# Patient Record
Sex: FEMALE | Race: WHITE | NOT HISPANIC OR LATINO | Employment: OTHER | URBAN - METROPOLITAN AREA
[De-identification: names, ages, dates, MRNs, and addresses within clinical notes are randomized per-mention and may not be internally consistent; named-entity substitution may affect disease eponyms.]

---

## 2021-04-17 ENCOUNTER — APPOINTMENT (EMERGENCY)
Dept: RADIOLOGY | Facility: HOSPITAL | Age: 81
DRG: 177 | End: 2021-04-17
Payer: MEDICARE

## 2021-04-17 ENCOUNTER — HOSPITAL ENCOUNTER (INPATIENT)
Facility: HOSPITAL | Age: 81
LOS: 10 days | Discharge: HOME HEALTH | DRG: 177 | End: 2021-04-27
Attending: EMERGENCY MEDICINE | Admitting: INTERNAL MEDICINE
Payer: MEDICARE

## 2021-04-17 DIAGNOSIS — U07.1 COVID-19: Primary | ICD-10-CM

## 2021-04-17 DIAGNOSIS — I10 ESSENTIAL HYPERTENSION: ICD-10-CM

## 2021-04-17 DIAGNOSIS — E66.9 OBESITY (BMI 30-39.9): ICD-10-CM

## 2021-04-17 DIAGNOSIS — R09.02 HYPOXIA: ICD-10-CM

## 2021-04-17 DIAGNOSIS — E11.9 TYPE 2 DIABETES MELLITUS WITHOUT COMPLICATION, WITHOUT LONG-TERM CURRENT USE OF INSULIN (HCC): ICD-10-CM

## 2021-04-17 PROBLEM — J12.82 PNEUMONIA DUE TO COVID-19 VIRUS: Status: ACTIVE | Noted: 2021-04-17

## 2021-04-17 PROBLEM — E87.1 HYPONATREMIA: Status: ACTIVE | Noted: 2021-04-17

## 2021-04-17 LAB
ALBUMIN SERPL BCP-MCNC: 2.4 G/DL (ref 3.5–5)
ALP SERPL-CCNC: 58 U/L (ref 46–116)
ALT SERPL W P-5'-P-CCNC: 41 U/L (ref 12–78)
ANION GAP SERPL CALCULATED.3IONS-SCNC: 13 MMOL/L (ref 4–13)
APTT PPP: 35 SECONDS (ref 23–37)
AST SERPL W P-5'-P-CCNC: 33 U/L (ref 5–45)
BASOPHILS # BLD AUTO: 0.01 THOUSANDS/ΜL (ref 0–0.1)
BASOPHILS NFR BLD AUTO: 0 % (ref 0–1)
BILIRUB SERPL-MCNC: 0.6 MG/DL (ref 0.2–1)
BUN SERPL-MCNC: 14 MG/DL (ref 5–25)
CALCIUM ALBUM COR SERPL-MCNC: 9.4 MG/DL (ref 8.3–10.1)
CALCIUM SERPL-MCNC: 8.1 MG/DL (ref 8.3–10.1)
CHLORIDE SERPL-SCNC: 93 MMOL/L (ref 100–108)
CK SERPL-CCNC: 41 U/L (ref 26–192)
CO2 SERPL-SCNC: 23 MMOL/L (ref 21–32)
CREAT SERPL-MCNC: 0.85 MG/DL (ref 0.6–1.3)
CRP SERPL QL: 157.5 MG/L
D DIMER PPP FEU-MCNC: 1.69 UG/ML FEU
EOSINOPHIL # BLD AUTO: 0 THOUSAND/ΜL (ref 0–0.61)
EOSINOPHIL NFR BLD AUTO: 0 % (ref 0–6)
ERYTHROCYTE [DISTWIDTH] IN BLOOD BY AUTOMATED COUNT: 12.1 % (ref 11.6–15.1)
FLUAV RNA RESP QL NAA+PROBE: NEGATIVE
FLUBV RNA RESP QL NAA+PROBE: NEGATIVE
GFR SERPL CREATININE-BSD FRML MDRD: 64 ML/MIN/1.73SQ M
GLUCOSE SERPL-MCNC: 155 MG/DL (ref 65–140)
HCT VFR BLD AUTO: 39 % (ref 34.8–46.1)
HGB BLD-MCNC: 13.4 G/DL (ref 11.5–15.4)
IMM GRANULOCYTES # BLD AUTO: 0.02 THOUSAND/UL (ref 0–0.2)
IMM GRANULOCYTES NFR BLD AUTO: 0 % (ref 0–2)
INR PPP: 0.99 (ref 0.84–1.19)
LACTATE SERPL-SCNC: 1.4 MMOL/L (ref 0.5–2)
LYMPHOCYTES # BLD AUTO: 0.79 THOUSANDS/ΜL (ref 0.6–4.47)
LYMPHOCYTES NFR BLD AUTO: 15 % (ref 14–44)
MAGNESIUM SERPL-MCNC: 2 MG/DL (ref 1.6–2.6)
MCH RBC QN AUTO: 29.9 PG (ref 26.8–34.3)
MCHC RBC AUTO-ENTMCNC: 34.4 G/DL (ref 31.4–37.4)
MCV RBC AUTO: 87 FL (ref 82–98)
MONOCYTES # BLD AUTO: 0.28 THOUSAND/ΜL (ref 0.17–1.22)
MONOCYTES NFR BLD AUTO: 5 % (ref 4–12)
NEUTROPHILS # BLD AUTO: 4.25 THOUSANDS/ΜL (ref 1.85–7.62)
NEUTS SEG NFR BLD AUTO: 80 % (ref 43–75)
NRBC BLD AUTO-RTO: 0 /100 WBCS
NT-PROBNP SERPL-MCNC: 1805 PG/ML
PLATELET # BLD AUTO: 275 THOUSANDS/UL (ref 149–390)
PMV BLD AUTO: 9.2 FL (ref 8.9–12.7)
POTASSIUM SERPL-SCNC: 3.8 MMOL/L (ref 3.5–5.3)
PROT SERPL-MCNC: 6.4 G/DL (ref 6.4–8.2)
PROTHROMBIN TIME: 13.1 SECONDS (ref 11.6–14.5)
RBC # BLD AUTO: 4.48 MILLION/UL (ref 3.81–5.12)
RSV RNA RESP QL NAA+PROBE: NEGATIVE
SARS-COV-2 RNA RESP QL NAA+PROBE: POSITIVE
SODIUM SERPL-SCNC: 129 MMOL/L (ref 136–145)
TROPONIN I SERPL-MCNC: <0.02 NG/ML
WBC # BLD AUTO: 5.35 THOUSAND/UL (ref 4.31–10.16)

## 2021-04-17 PROCEDURE — 1123F ACP DISCUSS/DSCN MKR DOCD: CPT | Performed by: EMERGENCY MEDICINE

## 2021-04-17 PROCEDURE — 0241U HB NFCT DS VIR RESP RNA 4 TRGT: CPT | Performed by: EMERGENCY MEDICINE

## 2021-04-17 PROCEDURE — 93005 ELECTROCARDIOGRAM TRACING: CPT

## 2021-04-17 PROCEDURE — 82728 ASSAY OF FERRITIN: CPT | Performed by: EMERGENCY MEDICINE

## 2021-04-17 PROCEDURE — 84145 PROCALCITONIN (PCT): CPT | Performed by: EMERGENCY MEDICINE

## 2021-04-17 PROCEDURE — 85025 COMPLETE CBC W/AUTO DIFF WBC: CPT | Performed by: EMERGENCY MEDICINE

## 2021-04-17 PROCEDURE — 83036 HEMOGLOBIN GLYCOSYLATED A1C: CPT | Performed by: INTERNAL MEDICINE

## 2021-04-17 PROCEDURE — 80053 COMPREHEN METABOLIC PANEL: CPT | Performed by: EMERGENCY MEDICINE

## 2021-04-17 PROCEDURE — 83880 ASSAY OF NATRIURETIC PEPTIDE: CPT | Performed by: EMERGENCY MEDICINE

## 2021-04-17 PROCEDURE — 99285 EMERGENCY DEPT VISIT HI MDM: CPT

## 2021-04-17 PROCEDURE — 71045 X-RAY EXAM CHEST 1 VIEW: CPT

## 2021-04-17 PROCEDURE — 87040 BLOOD CULTURE FOR BACTERIA: CPT | Performed by: EMERGENCY MEDICINE

## 2021-04-17 PROCEDURE — 82330 ASSAY OF CALCIUM: CPT | Performed by: EMERGENCY MEDICINE

## 2021-04-17 PROCEDURE — 36415 COLL VENOUS BLD VENIPUNCTURE: CPT | Performed by: EMERGENCY MEDICINE

## 2021-04-17 PROCEDURE — 84484 ASSAY OF TROPONIN QUANT: CPT | Performed by: EMERGENCY MEDICINE

## 2021-04-17 PROCEDURE — 86140 C-REACTIVE PROTEIN: CPT | Performed by: EMERGENCY MEDICINE

## 2021-04-17 PROCEDURE — 83605 ASSAY OF LACTIC ACID: CPT | Performed by: EMERGENCY MEDICINE

## 2021-04-17 PROCEDURE — 96375 TX/PRO/DX INJ NEW DRUG ADDON: CPT

## 2021-04-17 PROCEDURE — 82550 ASSAY OF CK (CPK): CPT | Performed by: EMERGENCY MEDICINE

## 2021-04-17 PROCEDURE — 99291 CRITICAL CARE FIRST HOUR: CPT | Performed by: EMERGENCY MEDICINE

## 2021-04-17 PROCEDURE — 83735 ASSAY OF MAGNESIUM: CPT | Performed by: EMERGENCY MEDICINE

## 2021-04-17 PROCEDURE — 85730 THROMBOPLASTIN TIME PARTIAL: CPT | Performed by: EMERGENCY MEDICINE

## 2021-04-17 PROCEDURE — 96365 THER/PROPH/DIAG IV INF INIT: CPT

## 2021-04-17 PROCEDURE — 85379 FIBRIN DEGRADATION QUANT: CPT | Performed by: EMERGENCY MEDICINE

## 2021-04-17 PROCEDURE — 85610 PROTHROMBIN TIME: CPT | Performed by: EMERGENCY MEDICINE

## 2021-04-17 RX ORDER — ACETAMINOPHEN 325 MG/1
650 TABLET ORAL EVERY 6 HOURS PRN
Status: DISCONTINUED | OUTPATIENT
Start: 2021-04-17 | End: 2021-04-27 | Stop reason: HOSPADM

## 2021-04-17 RX ORDER — ASCORBIC ACID 500 MG
1000 TABLET ORAL EVERY 12 HOURS SCHEDULED
Status: COMPLETED | OUTPATIENT
Start: 2021-04-18 | End: 2021-04-24

## 2021-04-17 RX ORDER — DEXAMETHASONE SODIUM PHOSPHATE 4 MG/ML
6 INJECTION, SOLUTION INTRA-ARTICULAR; INTRALESIONAL; INTRAMUSCULAR; INTRAVENOUS; SOFT TISSUE ONCE
Status: COMPLETED | OUTPATIENT
Start: 2021-04-17 | End: 2021-04-17

## 2021-04-17 RX ORDER — DOXYCYCLINE HYCLATE 100 MG/1
100 CAPSULE ORAL ONCE
Status: COMPLETED | OUTPATIENT
Start: 2021-04-17 | End: 2021-04-17

## 2021-04-17 RX ORDER — ALBUMIN (HUMAN) 12.5 G/50ML
12.5 SOLUTION INTRAVENOUS ONCE
Status: COMPLETED | OUTPATIENT
Start: 2021-04-18 | End: 2021-04-18

## 2021-04-17 RX ORDER — ATORVASTATIN CALCIUM 40 MG/1
40 TABLET, FILM COATED ORAL
Status: DISCONTINUED | OUTPATIENT
Start: 2021-04-18 | End: 2021-04-27 | Stop reason: HOSPADM

## 2021-04-17 RX ORDER — DOXYCYCLINE HYCLATE 100 MG/1
100 CAPSULE ORAL EVERY 12 HOURS
Status: DISCONTINUED | OUTPATIENT
Start: 2021-04-18 | End: 2021-04-19

## 2021-04-17 RX ORDER — DEXAMETHASONE SODIUM PHOSPHATE 4 MG/ML
6 INJECTION, SOLUTION INTRA-ARTICULAR; INTRALESIONAL; INTRAMUSCULAR; INTRAVENOUS; SOFT TISSUE EVERY 24 HOURS
Status: COMPLETED | OUTPATIENT
Start: 2021-04-18 | End: 2021-04-26

## 2021-04-17 RX ORDER — ZINC SULFATE 50(220)MG
220 CAPSULE ORAL DAILY
Status: COMPLETED | OUTPATIENT
Start: 2021-04-18 | End: 2021-04-24

## 2021-04-17 RX ORDER — MULTIVITAMIN/IRON/FOLIC ACID 18MG-0.4MG
1 TABLET ORAL DAILY
Status: DISCONTINUED | OUTPATIENT
Start: 2021-04-25 | End: 2021-04-27 | Stop reason: HOSPADM

## 2021-04-17 RX ORDER — FAMOTIDINE 20 MG/1
20 TABLET, FILM COATED ORAL DAILY
Status: DISCONTINUED | OUTPATIENT
Start: 2021-04-18 | End: 2021-04-27 | Stop reason: HOSPADM

## 2021-04-17 RX ORDER — MELATONIN
2000 DAILY
Status: DISCONTINUED | OUTPATIENT
Start: 2021-04-18 | End: 2021-04-27 | Stop reason: HOSPADM

## 2021-04-17 RX ORDER — CEFTRIAXONE 1 G/50ML
1000 INJECTION, SOLUTION INTRAVENOUS ONCE
Status: COMPLETED | OUTPATIENT
Start: 2021-04-17 | End: 2021-04-17

## 2021-04-17 RX ORDER — ONDANSETRON 2 MG/ML
4 INJECTION INTRAMUSCULAR; INTRAVENOUS EVERY 6 HOURS PRN
Status: DISCONTINUED | OUTPATIENT
Start: 2021-04-17 | End: 2021-04-27 | Stop reason: HOSPADM

## 2021-04-17 RX ORDER — CEFTRIAXONE 1 G/50ML
1000 INJECTION, SOLUTION INTRAVENOUS EVERY 24 HOURS
Status: DISCONTINUED | OUTPATIENT
Start: 2021-04-18 | End: 2021-04-19

## 2021-04-17 RX ADMIN — DOXYCYCLINE 100 MG: 100 CAPSULE ORAL at 21:29

## 2021-04-17 RX ADMIN — CEFTRIAXONE 1000 MG: 1 INJECTION, SOLUTION INTRAVENOUS at 21:30

## 2021-04-17 RX ADMIN — DEXAMETHASONE SODIUM PHOSPHATE 6 MG: 4 INJECTION, SOLUTION INTRA-ARTICULAR; INTRALESIONAL; INTRAMUSCULAR; INTRAVENOUS; SOFT TISSUE at 21:29

## 2021-04-18 LAB
ABO GROUP BLD: NORMAL
ALBUMIN SERPL BCP-MCNC: 2.4 G/DL (ref 3.5–5)
ALP SERPL-CCNC: 59 U/L (ref 46–116)
ALT SERPL W P-5'-P-CCNC: 35 U/L (ref 12–78)
ANION GAP SERPL CALCULATED.3IONS-SCNC: 16 MMOL/L (ref 4–13)
AST SERPL W P-5'-P-CCNC: 33 U/L (ref 5–45)
BASOPHILS # BLD MANUAL: 0 THOUSAND/UL (ref 0–0.1)
BASOPHILS NFR MAR MANUAL: 0 % (ref 0–1)
BILIRUB SERPL-MCNC: 0.5 MG/DL (ref 0.2–1)
BUN SERPL-MCNC: 16 MG/DL (ref 5–25)
CA-I BLD-SCNC: 1.07 MMOL/L (ref 1.12–1.32)
CALCIUM ALBUM COR SERPL-MCNC: 9.7 MG/DL (ref 8.3–10.1)
CALCIUM SERPL-MCNC: 8.4 MG/DL (ref 8.3–10.1)
CHLORIDE SERPL-SCNC: 96 MMOL/L (ref 100–108)
CO2 SERPL-SCNC: 19 MMOL/L (ref 21–32)
CREAT SERPL-MCNC: 0.71 MG/DL (ref 0.6–1.3)
EOSINOPHIL # BLD MANUAL: 0 THOUSAND/UL (ref 0–0.4)
EOSINOPHIL NFR BLD MANUAL: 0 % (ref 0–6)
ERYTHROCYTE [DISTWIDTH] IN BLOOD BY AUTOMATED COUNT: 12 % (ref 11.6–15.1)
EST. AVERAGE GLUCOSE BLD GHB EST-MCNC: 206 MG/DL
FERRITIN SERPL-MCNC: 337 NG/ML (ref 8–388)
GFR SERPL CREATININE-BSD FRML MDRD: 80 ML/MIN/1.73SQ M
GLUCOSE SERPL-MCNC: 165 MG/DL (ref 65–140)
GLUCOSE SERPL-MCNC: 191 MG/DL (ref 65–140)
GLUCOSE SERPL-MCNC: 206 MG/DL (ref 65–140)
GLUCOSE SERPL-MCNC: 207 MG/DL (ref 65–140)
GLUCOSE SERPL-MCNC: 231 MG/DL (ref 65–140)
GLUCOSE SERPL-MCNC: 255 MG/DL (ref 65–140)
HBA1C MFR BLD: 8.8 %
HBV CORE AB SER QL: NORMAL
HBV CORE IGM SER QL: NORMAL
HBV SURFACE AG SER QL: NORMAL
HCT VFR BLD AUTO: 38.2 % (ref 34.8–46.1)
HCV AB SER QL: NORMAL
HGB BLD-MCNC: 12.9 G/DL (ref 11.5–15.4)
HIV 1+2 AB+HIV1 P24 AG SERPL QL IA: NORMAL
HIV1 P24 AG SER QL: NORMAL
LG PLATELETS BLD QL SMEAR: PRESENT
LYMPHOCYTES # BLD AUTO: 0.41 THOUSAND/UL (ref 0.6–4.47)
LYMPHOCYTES # BLD AUTO: 12 % (ref 14–44)
MCH RBC QN AUTO: 29.5 PG (ref 26.8–34.3)
MCHC RBC AUTO-ENTMCNC: 33.8 G/DL (ref 31.4–37.4)
MCV RBC AUTO: 87 FL (ref 82–98)
MONOCYTES # BLD AUTO: 0 THOUSAND/UL (ref 0–1.22)
MONOCYTES NFR BLD: 0 % (ref 4–12)
NEUTROPHILS # BLD MANUAL: 2.98 THOUSAND/UL (ref 1.85–7.62)
NEUTS BAND NFR BLD MANUAL: 5 % (ref 0–8)
NEUTS SEG NFR BLD AUTO: 82 % (ref 43–75)
NRBC BLD AUTO-RTO: 0 /100 WBCS
OSMOLALITY UR/SERPL-RTO: 281 MMOL/KG (ref 282–298)
OSMOLALITY UR: 345 MMOL/KG
PELGER HUET CELLS BLD QL SMEAR: PRESENT
PLATELET # BLD AUTO: 307 THOUSANDS/UL (ref 149–390)
PLATELET BLD QL SMEAR: ADEQUATE
PMV BLD AUTO: 9.8 FL (ref 8.9–12.7)
POTASSIUM SERPL-SCNC: 4 MMOL/L (ref 3.5–5.3)
PROCALCITONIN SERPL-MCNC: <0.05 NG/ML
PROCALCITONIN SERPL-MCNC: <0.05 NG/ML
PROT SERPL-MCNC: 6.5 G/DL (ref 6.4–8.2)
RBC # BLD AUTO: 4.38 MILLION/UL (ref 3.81–5.12)
RH BLD: POSITIVE
SODIUM 24H UR-SCNC: 21 MOL/L
SODIUM SERPL-SCNC: 131 MMOL/L (ref 136–145)
TOTAL CELLS COUNTED SPEC: 100
TSH SERPL DL<=0.05 MIU/L-ACNC: 2.11 UIU/ML (ref 0.36–3.74)
URATE SERPL-MCNC: 4.1 MG/DL (ref 2–6.8)
VARIANT LYMPHS # BLD AUTO: 1 %
WBC # BLD AUTO: 3.42 THOUSAND/UL (ref 4.31–10.16)

## 2021-04-18 PROCEDURE — 80053 COMPREHEN METABOLIC PANEL: CPT | Performed by: INTERNAL MEDICINE

## 2021-04-18 PROCEDURE — 86704 HEP B CORE ANTIBODY TOTAL: CPT | Performed by: INTERNAL MEDICINE

## 2021-04-18 PROCEDURE — 85007 BL SMEAR W/DIFF WBC COUNT: CPT | Performed by: INTERNAL MEDICINE

## 2021-04-18 PROCEDURE — 86803 HEPATITIS C AB TEST: CPT | Performed by: INTERNAL MEDICINE

## 2021-04-18 PROCEDURE — 83935 ASSAY OF URINE OSMOLALITY: CPT | Performed by: INTERNAL MEDICINE

## 2021-04-18 PROCEDURE — XW033E5 INTRODUCTION OF REMDESIVIR ANTI-INFECTIVE INTO PERIPHERAL VEIN, PERCUTANEOUS APPROACH, NEW TECHNOLOGY GROUP 5: ICD-10-PCS | Performed by: INTERNAL MEDICINE

## 2021-04-18 PROCEDURE — 84550 ASSAY OF BLOOD/URIC ACID: CPT | Performed by: INTERNAL MEDICINE

## 2021-04-18 PROCEDURE — 99222 1ST HOSP IP/OBS MODERATE 55: CPT | Performed by: INTERNAL MEDICINE

## 2021-04-18 PROCEDURE — 86901 BLOOD TYPING SEROLOGIC RH(D): CPT | Performed by: INTERNAL MEDICINE

## 2021-04-18 PROCEDURE — 87806 HIV AG W/HIV1&2 ANTB W/OPTIC: CPT | Performed by: INTERNAL MEDICINE

## 2021-04-18 PROCEDURE — 87340 HEPATITIS B SURFACE AG IA: CPT | Performed by: INTERNAL MEDICINE

## 2021-04-18 PROCEDURE — 83930 ASSAY OF BLOOD OSMOLALITY: CPT | Performed by: INTERNAL MEDICINE

## 2021-04-18 PROCEDURE — 84145 PROCALCITONIN (PCT): CPT | Performed by: EMERGENCY MEDICINE

## 2021-04-18 PROCEDURE — 82948 REAGENT STRIP/BLOOD GLUCOSE: CPT

## 2021-04-18 PROCEDURE — 86705 HEP B CORE ANTIBODY IGM: CPT | Performed by: INTERNAL MEDICINE

## 2021-04-18 PROCEDURE — 86900 BLOOD TYPING SEROLOGIC ABO: CPT | Performed by: INTERNAL MEDICINE

## 2021-04-18 PROCEDURE — 85027 COMPLETE CBC AUTOMATED: CPT | Performed by: INTERNAL MEDICINE

## 2021-04-18 PROCEDURE — 84300 ASSAY OF URINE SODIUM: CPT | Performed by: INTERNAL MEDICINE

## 2021-04-18 PROCEDURE — 84443 ASSAY THYROID STIM HORMONE: CPT | Performed by: INTERNAL MEDICINE

## 2021-04-18 RX ORDER — CILOSTAZOL 50 MG/1
50 TABLET ORAL 2 TIMES DAILY
COMMUNITY

## 2021-04-18 RX ORDER — CILOSTAZOL 50 MG/1
50 TABLET ORAL 2 TIMES DAILY
Status: DISCONTINUED | OUTPATIENT
Start: 2021-04-18 | End: 2021-04-27 | Stop reason: HOSPADM

## 2021-04-18 RX ORDER — BENZONATATE 100 MG/1
100 CAPSULE ORAL 3 TIMES DAILY PRN
Status: DISCONTINUED | OUTPATIENT
Start: 2021-04-18 | End: 2021-04-27 | Stop reason: HOSPADM

## 2021-04-18 RX ORDER — SODIUM CHLORIDE 9 MG/ML
75 INJECTION, SOLUTION INTRAVENOUS CONTINUOUS
Status: DISCONTINUED | OUTPATIENT
Start: 2021-04-18 | End: 2021-04-19

## 2021-04-18 RX ORDER — GUAIFENESIN 600 MG
600 TABLET, EXTENDED RELEASE 12 HR ORAL EVERY 12 HOURS SCHEDULED
Status: DISCONTINUED | OUTPATIENT
Start: 2021-04-18 | End: 2021-04-27 | Stop reason: HOSPADM

## 2021-04-18 RX ORDER — POLYETHYLENE GLYCOL 3350 17 G/17G
17 POWDER, FOR SOLUTION ORAL DAILY
Status: DISCONTINUED | OUTPATIENT
Start: 2021-04-18 | End: 2021-04-21

## 2021-04-18 RX ORDER — DOCUSATE SODIUM 100 MG/1
100 CAPSULE, LIQUID FILLED ORAL 2 TIMES DAILY
Status: DISCONTINUED | OUTPATIENT
Start: 2021-04-18 | End: 2021-04-21

## 2021-04-18 RX ORDER — SENNOSIDES 8.6 MG
2 TABLET ORAL
Status: DISCONTINUED | OUTPATIENT
Start: 2021-04-18 | End: 2021-04-21

## 2021-04-18 RX ADMIN — INSULIN LISPRO 2 UNITS: 100 INJECTION, SOLUTION INTRAVENOUS; SUBCUTANEOUS at 17:19

## 2021-04-18 RX ADMIN — INSULIN LISPRO 3 UNITS: 100 INJECTION, SOLUTION INTRAVENOUS; SUBCUTANEOUS at 12:24

## 2021-04-18 RX ADMIN — DEXAMETHASONE SODIUM PHOSPHATE 6 MG: 4 INJECTION, SOLUTION INTRA-ARTICULAR; INTRALESIONAL; INTRAMUSCULAR; INTRAVENOUS; SOFT TISSUE at 21:09

## 2021-04-18 RX ADMIN — ZINC SULFATE 220 MG (50 MG) CAPSULE 220 MG: CAPSULE at 08:42

## 2021-04-18 RX ADMIN — GUAIFENESIN 600 MG: 600 TABLET ORAL at 08:42

## 2021-04-18 RX ADMIN — BENZONATATE 100 MG: 100 CAPSULE ORAL at 14:28

## 2021-04-18 RX ADMIN — ALBUMIN (HUMAN) 12.5 G: 0.25 INJECTION, SOLUTION INTRAVENOUS at 00:27

## 2021-04-18 RX ADMIN — INSULIN LISPRO 3 UNITS: 100 INJECTION, SOLUTION INTRAVENOUS; SUBCUTANEOUS at 08:38

## 2021-04-18 RX ADMIN — ENOXAPARIN SODIUM 30 MG: 30 INJECTION SUBCUTANEOUS at 08:43

## 2021-04-18 RX ADMIN — ATORVASTATIN CALCIUM 40 MG: 40 TABLET, FILM COATED ORAL at 00:25

## 2021-04-18 RX ADMIN — ATORVASTATIN CALCIUM 40 MG: 40 TABLET, FILM COATED ORAL at 21:20

## 2021-04-18 RX ADMIN — FAMOTIDINE 20 MG: 20 TABLET ORAL at 08:42

## 2021-04-18 RX ADMIN — CILOSTAZOL 50 MG: 50 TABLET ORAL at 12:43

## 2021-04-18 RX ADMIN — CILOSTAZOL 50 MG: 50 TABLET ORAL at 17:20

## 2021-04-18 RX ADMIN — CEFTRIAXONE 1000 MG: 1 INJECTION, SOLUTION INTRAVENOUS at 21:07

## 2021-04-18 RX ADMIN — Medication 2000 UNITS: at 08:40

## 2021-04-18 RX ADMIN — DOCUSATE SODIUM 100 MG: 100 CAPSULE, LIQUID FILLED ORAL at 08:41

## 2021-04-18 RX ADMIN — SODIUM CHLORIDE 75 ML/HR: 0.9 INJECTION, SOLUTION INTRAVENOUS at 08:49

## 2021-04-18 RX ADMIN — ENOXAPARIN SODIUM 30 MG: 30 INJECTION SUBCUTANEOUS at 21:15

## 2021-04-18 RX ADMIN — DOXYCYCLINE 100 MG: 100 CAPSULE ORAL at 08:41

## 2021-04-18 RX ADMIN — ENOXAPARIN SODIUM 30 MG: 30 INJECTION SUBCUTANEOUS at 00:26

## 2021-04-18 RX ADMIN — DOXYCYCLINE 100 MG: 100 CAPSULE ORAL at 21:08

## 2021-04-18 RX ADMIN — INSULIN LISPRO 1 UNITS: 100 INJECTION, SOLUTION INTRAVENOUS; SUBCUTANEOUS at 00:25

## 2021-04-18 RX ADMIN — OXYCODONE HYDROCHLORIDE AND ACETAMINOPHEN 1000 MG: 500 TABLET ORAL at 08:43

## 2021-04-18 RX ADMIN — OXYCODONE HYDROCHLORIDE AND ACETAMINOPHEN 1000 MG: 500 TABLET ORAL at 00:25

## 2021-04-18 RX ADMIN — SODIUM CHLORIDE 500 ML: 0.9 INJECTION, SOLUTION INTRAVENOUS at 01:27

## 2021-04-18 RX ADMIN — REMDESIVIR 200 MG: 100 INJECTION, POWDER, LYOPHILIZED, FOR SOLUTION INTRAVENOUS at 08:47

## 2021-04-18 RX ADMIN — GUAIFENESIN 600 MG: 600 TABLET ORAL at 21:15

## 2021-04-18 RX ADMIN — INSULIN LISPRO 2 UNITS: 100 INJECTION, SOLUTION INTRAVENOUS; SUBCUTANEOUS at 21:12

## 2021-04-18 RX ADMIN — SODIUM CHLORIDE 75 ML/HR: 0.9 INJECTION, SOLUTION INTRAVENOUS at 21:25

## 2021-04-18 RX ADMIN — OXYCODONE HYDROCHLORIDE AND ACETAMINOPHEN 1000 MG: 500 TABLET ORAL at 21:15

## 2021-04-18 NOTE — PLAN OF CARE
Problem: Potential for Falls  Goal: Patient will remain free of falls  Description: INTERVENTIONS:  - Assess patient frequently for physical needs  -  Identify cognitive and physical deficits and behaviors that affect risk of falls  -  [place] fall precautions as indicated by assessment   - Educate patient/family on patient safety including physical limitations  - Instruct patient to call for assistance with activity based on assessment  - Modify environment to reduce risk of injury  - Consider OT/PT consult to assist with strengthening/mobility  Outcome: Progressing     Problem: Nutrition/Hydration-ADULT  Goal: Nutrient/Hydration intake appropriate for improving, restoring or maintaining nutritional needs  Description: Monitor and assess patient's nutrition/hydration status for malnutrition  Collaborate with interdisciplinary team and initiate plan and interventions as ordered  Monitor patient's weight and dietary intake as ordered or per policy  Utilize nutrition screening tool and intervene as necessary  Determine patient's food preferences and provide high-protein, high-caloric foods as appropriate       INTERVENTIONS:  - Monitor oral intake, urinary output, labs, and treatment plans  - Assess nutrition and hydration status and recommend course of action  - Evaluate amount of meals eaten  - Assist patient with eating if necessary   - Allow adequate time for meals  - Recommend/ encourage appropriate diets, oral nutritional supplements, and vitamin/mineral supplements  - Order, calculate, and assess calorie counts as needed  - Recommend, monitor, and adjust tube feedings and TPN/PPN based on assessed needs  - Assess need for intravenous fluids  - Provide specific nutrition/hydration education as appropriate  - Include patient/family/caregiver in decisions related to nutrition  Outcome: Progressing     Problem: PAIN - ADULT  Goal: Verbalizes/displays adequate comfort level or baseline comfort level  Description: Interventions:  - Encourage patient to monitor pain and request assistance  - Assess pain using appropriate pain scale  - Administer analgesics based on type and severity of pain and evaluate response  - Implement non-pharmacological measures as appropriate and evaluate response  - Consider cultural and social influences on pain and pain management  - Notify physician/advanced practitioner if interventions unsuccessful or patient reports new pain  Outcome: Progressing     Problem: INFECTION - ADULT  Goal: Absence or prevention of progression during hospitalization  Description: INTERVENTIONS:  - Assess and monitor for signs and symptoms of infection  - Monitor lab/diagnostic results  - Monitor all insertion sites, i e  indwelling lines, tubes, and drains  - Monitor endotracheal if appropriate and nasal secretions for changes in amount and color  - [place] appropriate cooling/warming therapies per order  - Administer medications as ordered  - Instruct and encourage patient and family to use good hand hygiene technique  - Identify and instruct in appropriate isolation precautions for identified infection/condition  Outcome: Progressing     Problem: SAFETY ADULT  Goal: Patient will remain free of falls  Description: INTERVENTIONS:  - Assess patient frequently for physical needs  -  Identify cognitive and physical deficits and behaviors that affect risk of falls    -  [place] fall precautions as indicated by assessment   - Educate patient/family on patient safety including physical limitations  - Instruct patient to call for assistance with activity based on assessment  - Modify environment to reduce risk of injury  - Consider OT/PT consult to assist with strengthening/mobility  Outcome: Progressing  Goal: Maintain or return to baseline ADL function  Description: INTERVENTIONS:  -  Assess patient's ability to carry out ADLs; assess patient's baseline for ADL function and identify physical deficits which impact ability to perform ADLs (bathing, care of mouth/teeth, toileting, grooming, dressing, etc )  - Assess/evaluate cause of self-care deficits   - Assess range of motion  - Assess patient's mobility; develop plan if impaired  - Assess patient's need for assistive devices and provide as appropriate  - Encourage maximum independence but intervene and supervise when necessary  - Involve family in performance of ADLs  - Assess for home care needs following discharge   - Consider OT consult to assist with ADL evaluation and planning for discharge  - Provide patient education as appropriate  Outcome: Progressing  Goal: Maintain or return mobility status to optimal level  Description: INTERVENTIONS:  - Assess patient's baseline mobility status (ambulation, transfers, stairs, etc )    - Identify cognitive and physical deficits and behaviors that affect mobility  - Identify mobility aids required to assist with transfers and/or ambulation (gait belt, sit-to-stand, lift, walker, cane, etc )  - [place] fall precautions as indicated by assessment  - Record patient progress and toleration of activity level on Mobility SBAR; progress patient to next Phase/Stage  - Instruct patient to call for assistance with activity based on assessment  - Consider rehabilitation consult to assist with strengthening/weightbearing, etc   Outcome: Progressing     Problem: DISCHARGE PLANNING  Goal: Discharge to home or other facility with appropriate resources  Description: INTERVENTIONS:  - Identify barriers to discharge w/patient and caregiver  - Arrange for needed discharge resources and transportation as appropriate  - Identify discharge learning needs (meds, wound care, etc )  - Arrange for interpretive services to assist at discharge as needed  - Refer to Case Management Department for coordinating discharge planning if the patient needs post-hospital services based on physician/advanced practitioner order or complex needs related to functional status, cognitive ability, or social support system  Outcome: Progressing     Problem: Knowledge Deficit  Goal: Patient/family/caregiver demonstrates understanding of disease process, treatment plan, medications, and discharge instructions  Description: Complete learning assessment and assess knowledge base    Interventions:  - Provide teaching at level of understanding  - Provide teaching via preferred learning methods  Outcome: Progressing     Problem: RESPIRATORY - ADULT  Goal: Achieves optimal ventilation and oxygenation  Description: INTERVENTIONS:  - Assess for changes in respiratory status  - Assess for changes in mentation and behavior  - Position to facilitate oxygenation and minimize respiratory effort  - Oxygen administered by appropriate delivery if ordered  - Initiate smoking cessation education as indicated  - Encourage broncho-pulmonary hygiene including cough, deep breathe, Incentive Spirometry  - Assess the need for suctioning and aspirate as needed  - Assess and instruct to report SOB or any respiratory difficulty  - Respiratory Therapy support as indicated  Outcome: Progressing     Problem: MUSCULOSKELETAL - ADULT  Goal: Maintain or return mobility to safest level of function  Description: INTERVENTIONS:  - Assess patient's ability to carry out ADLs; assess patient's baseline for ADL function and identify physical deficits which impact ability to perform ADLs (bathing, care of mouth/teeth, toileting, grooming, dressing, etc )  - Assess/evaluate cause of self-care deficits   - Assess range of motion  - Assess patient's mobility  - Assess patient's need for assistive devices and provide as appropriate  - Encourage maximum independence but intervene and supervise when necessary  - Involve family in performance of ADLs  - Assess for home care needs following discharge   - Consider OT consult to assist with ADL evaluation and planning for discharge  - Provide patient education as appropriate  Outcome: Progressing

## 2021-04-18 NOTE — PLAN OF CARE
Problem: Potential for Falls  Goal: Patient will remain free of falls  Description: INTERVENTIONS:  - Assess patient frequently for physical needs  -  Identify cognitive and physical deficits and behaviors that affect risk of falls  -  [place] fall precautions as indicated by assessment   - Educate patient/family on patient safety including physical limitations  - Instruct patient to call for assistance with activity based on assessment  - Modify environment to reduce risk of injury  - Consider OT/PT consult to assist with strengthening/mobility  Outcome: Progressing     Problem: Nutrition/Hydration-ADULT  Goal: Nutrient/Hydration intake appropriate for improving, restoring or maintaining nutritional needs  Description: Monitor and assess patient's nutrition/hydration status for malnutrition  Collaborate with interdisciplinary team and initiate plan and interventions as ordered  Monitor patient's weight and dietary intake as ordered or per policy  Utilize nutrition screening tool and intervene as necessary  Determine patient's food preferences and provide high-protein, high-caloric foods as appropriate       INTERVENTIONS:  - Monitor oral intake, urinary output, labs, and treatment plans  - Assess nutrition and hydration status and recommend course of action  - Evaluate amount of meals eaten  - Assist patient with eating if necessary   - Allow adequate time for meals  - Recommend/ encourage appropriate diets, oral nutritional supplements, and vitamin/mineral supplements  - Order, calculate, and assess calorie counts as needed  - Recommend, monitor, and adjust tube feedings and TPN/PPN based on assessed needs  - Assess need for intravenous fluids  - Provide specific nutrition/hydration education as appropriate  - Include patient/family/caregiver in decisions related to nutrition  Outcome: Progressing     Problem: PAIN - ADULT  Goal: Verbalizes/displays adequate comfort level or baseline comfort level  Description: Interventions:  - Encourage patient to monitor pain and request assistance  - Assess pain using appropriate pain scale  - Administer analgesics based on type and severity of pain and evaluate response  - Implement non-pharmacological measures as appropriate and evaluate response  - Consider cultural and social influences on pain and pain management  - Notify physician/advanced practitioner if interventions unsuccessful or patient reports new pain  Outcome: Progressing     Problem: INFECTION - ADULT  Goal: Absence or prevention of progression during hospitalization  Description: INTERVENTIONS:  - Assess and monitor for signs and symptoms of infection  - Monitor lab/diagnostic results  - Monitor all insertion sites, i e  indwelling lines, tubes, and drains  - Monitor endotracheal if appropriate and nasal secretions for changes in amount and color  - [place] appropriate cooling/warming therapies per order  - Administer medications as ordered  - Instruct and encourage patient and family to use good hand hygiene technique  - Identify and instruct in appropriate isolation precautions for identified infection/condition  Outcome: Progressing     Problem: SAFETY ADULT  Goal: Patient will remain free of falls  Description: INTERVENTIONS:  - Assess patient frequently for physical needs  -  Identify cognitive and physical deficits and behaviors that affect risk of falls    -  [place] fall precautions as indicated by assessment   - Educate patient/family on patient safety including physical limitations  - Instruct patient to call for assistance with activity based on assessment  - Modify environment to reduce risk of injury  - Consider OT/PT consult to assist with strengthening/mobility  Outcome: Progressing  Goal: Maintain or return to baseline ADL function  Description: INTERVENTIONS:  -  Assess patient's ability to carry out ADLs; assess patient's baseline for ADL function and identify physical deficits which impact ability to perform ADLs (bathing, care of mouth/teeth, toileting, grooming, dressing, etc )  - Assess/evaluate cause of self-care deficits   - Assess range of motion  - Assess patient's mobility; develop plan if impaired  - Assess patient's need for assistive devices and provide as appropriate  - Encourage maximum independence but intervene and supervise when necessary  - Involve family in performance of ADLs  - Assess for home care needs following discharge   - Consider OT consult to assist with ADL evaluation and planning for discharge  - Provide patient education as appropriate  Outcome: Progressing  Goal: Maintain or return mobility status to optimal level  Description: INTERVENTIONS:  - Assess patient's baseline mobility status (ambulation, transfers, stairs, etc )    - Identify cognitive and physical deficits and behaviors that affect mobility  - Identify mobility aids required to assist with transfers and/or ambulation (gait belt, sit-to-stand, lift, walker, cane, etc )  - [place] fall precautions as indicated by assessment  - Record patient progress and toleration of activity level on Mobility SBAR; progress patient to next Phase/Stage  - Instruct patient to call for assistance with activity based on assessment  - Consider rehabilitation consult to assist with strengthening/weightbearing, etc   Outcome: Progressing     Problem: DISCHARGE PLANNING  Goal: Discharge to home or other facility with appropriate resources  Description: INTERVENTIONS:  - Identify barriers to discharge w/patient and caregiver  - Arrange for needed discharge resources and transportation as appropriate  - Identify discharge learning needs (meds, wound care, etc )  - Arrange for interpretive services to assist at discharge as needed  - Refer to Case Management Department for coordinating discharge planning if the patient needs post-hospital services based on physician/advanced practitioner order or complex needs related to functional status, cognitive ability, or social support system  Outcome: Progressing     Problem: Knowledge Deficit  Goal: Patient/family/caregiver demonstrates understanding of disease process, treatment plan, medications, and discharge instructions  Description: Complete learning assessment and assess knowledge base    Interventions:  - Provide teaching at level of understanding  - Provide teaching via preferred learning methods  Outcome: Progressing     Problem: RESPIRATORY - ADULT  Goal: Achieves optimal ventilation and oxygenation  Description: INTERVENTIONS:  - Assess for changes in respiratory status  - Assess for changes in mentation and behavior  - Position to facilitate oxygenation and minimize respiratory effort  - Oxygen administered by appropriate delivery if ordered  - Initiate smoking cessation education as indicated  - Encourage broncho-pulmonary hygiene including cough, deep breathe, Incentive Spirometry  - Assess the need for suctioning and aspirate as needed  - Assess and instruct to report SOB or any respiratory difficulty  - Respiratory Therapy support as indicated  Outcome: Progressing     Problem: MUSCULOSKELETAL - ADULT  Goal: Maintain or return mobility to safest level of function  Description: INTERVENTIONS:  - Assess patient's ability to carry out ADLs; assess patient's baseline for ADL function and identify physical deficits which impact ability to perform ADLs (bathing, care of mouth/teeth, toileting, grooming, dressing, etc )  - Assess/evaluate cause of self-care deficits   - Assess range of motion  - Assess patient's mobility  - Assess patient's need for assistive devices and provide as appropriate  - Encourage maximum independence but intervene and supervise when necessary  - Involve family in performance of ADLs  - Assess for home care needs following discharge   - Consider OT consult to assist with ADL evaluation and planning for discharge  - Provide patient education as appropriate  Outcome: Progressing

## 2021-04-18 NOTE — ASSESSMENT & PLAN NOTE
No results found for: HGBA1C    No results for input(s): POCGLU in the last 72 hours  Blood Sugar Average: Last 72 hrs:    · Patient reports she takes oral diabetes medications at home, She is not sure which ones or doses  Denies current use of insulin or other injection medications     · Start SSI  · Order A1c

## 2021-04-18 NOTE — ED PROVIDER NOTES
History  Chief Complaint   Patient presents with    Shortness of Breath     low Spo2 readings at home  78-year-old female presents for evaluation of a generalized fatigue this been ongoing for the last week but progressively became worse yesterday  Patient underwent outpatient COVID testing a few days ago but has not received the results yet  Denies chest pain or shortness of breath at this time but states she just feels very tired  Worse with any exertion  No specific alleviating factors  Fatigue  Severity:  Moderate  Onset quality:  Gradual  Duration:  7 days  Timing:  Constant  Progression:  Worsening  Chronicity:  New  Associated symptoms: no abdominal pain, no chest pain, no cough, no diarrhea, no dysuria, no fever, no frequency, no headaches, no nausea, no shortness of breath, no urgency and no vomiting        None       Past Medical History:   Diagnosis Date    [facility]        History reviewed  No pertinent surgical history  History reviewed  No pertinent family history  I have reviewed and agree with the history as documented  E-Cigarette/Vaping    E-Cigarette Use Never User      E-Cigarette/Vaping Substances    Nicotine No     THC No     CBD No     Flavoring No     Other No     Unknown No      Social History     Tobacco Use    Smoking status: Former Smoker    Smokeless tobacco: Never Used   Substance Use Topics    Alcohol use: Not Currently    Drug use: Not Currently       Review of Systems   Constitutional: Positive for fatigue  Negative for chills, diaphoresis and fever  HENT: Negative for congestion and rhinorrhea  Eyes: Negative for pain and visual disturbance  Respiratory: Negative for cough, shortness of breath and wheezing  Cardiovascular: Negative for chest pain and leg swelling  Gastrointestinal: Negative for abdominal pain, diarrhea, nausea and vomiting  Genitourinary: Negative for difficulty urinating, dysuria, frequency and urgency  Musculoskeletal: Negative for back pain and neck pain  Skin: Negative for color change and rash  Neurological: Negative for syncope, numbness and headaches  All other systems reviewed and are negative  Physical Exam  Physical Exam  Vitals signs and nursing note reviewed  Constitutional:       Appearance: She is well-developed  HENT:      Head: Normocephalic and atraumatic  Right Ear: External ear normal       Left Ear: External ear normal       Nose: Nose normal    Eyes:      General: No scleral icterus  Neck:      Musculoskeletal: Normal range of motion  Cardiovascular:      Rate and Rhythm: Normal rate  Pulmonary:      Effort: Pulmonary effort is normal  No respiratory distress  Abdominal:      General: There is no distension  Musculoskeletal: Normal range of motion  General: No deformity  Skin:     Findings: No rash  Neurological:      General: No focal deficit present  Mental Status: She is alert and oriented to person, place, and time     Psychiatric:         Mood and Affect: Mood normal          Vital Signs  ED Triage Vitals [04/17/21 2026]   Temperature Pulse Respirations Blood Pressure SpO2   100 5 °F (38 1 °C) 69 18 (!) 222/95 (!) 84 %      Temp Source Heart Rate Source Patient Position - Orthostatic VS BP Location FiO2 (%)   Tympanic Monitor Lying Left arm --      Pain Score       --           Vitals:    04/17/21 2026 04/17/21 2230   BP: (!) 222/95 (!) 182/73   Pulse: 69 62   Patient Position - Orthostatic VS: Lying          Visual Acuity      ED Medications  Medications   cefTRIAXone (ROCEPHIN) IVPB (premix in dextrose) 1,000 mg 50 mL (0 mg Intravenous Stopped 4/17/21 2242)   doxycycline hyclate (VIBRAMYCIN) capsule 100 mg (100 mg Oral Given 4/17/21 2129)   dexamethasone (DECADRON) injection 6 mg (6 mg Intravenous Given 4/17/21 2129)       Diagnostic Studies  Results Reviewed     Procedure Component Value Units Date/Time    COVID19, Influenza A/B, RSV PCR, Shelia Aden [[id number]]  (Abnormal) Collected: 04/17/21 2111    Lab Status: Final result Specimen: Nares from Nasopharyngeal Swab Updated: 04/17/21 2208     SARS-CoV-2 Positive     INFLUENZA A PCR Negative     INFLUENZA B PCR Negative     RSV PCR Negative    Narrative: This test has been authorized by FDA under an EUA (Emergency Use Assay) for use by authorized laboratories  Clinical caution and judgement should be used with the interpretation of these results with consideration of the clinical impression and other laboratory testing  Testing reported as "Positive" or "Negative" has been proven to be accurate according to standard laboratory validation requirements  All testing is performed with control materials showing appropriate reactivity at standard intervals  Blood culture [[id number]] Collected: 04/17/21 2155    Lab Status:  In process Specimen: Blood from Hand, Right Updated: 04/17/21 2205    C-reactive protein [[id number]]  (Abnormal) Collected: 04/17/21 2117    Lab Status: Final result Specimen: Blood from Arm, Left Updated: 04/17/21 2200      5 mg/L     Comprehensive metabolic panel [[id number]]  (Abnormal) Collected: 04/17/21 2117    Lab Status: Final result Specimen: Blood from Arm, Left Updated: 04/17/21 2200     Sodium 129 mmol/L      Potassium 3 8 mmol/L      Chloride 93 mmol/L      CO2 23 mmol/L      ANION GAP 13 mmol/L      BUN 14 mg/dL      Creatinine 0 85 mg/dL      Glucose 155 mg/dL      Calcium 8 1 mg/dL      Corrected Calcium 9 4 mg/dL      AST 33 U/L      ALT 41 U/L      Alkaline Phosphatase 58 U/L      Total Protein 6 4 g/dL      Albumin 2 4 g/dL      Total Bilirubin 0 60 mg/dL      eGFR 64 ml/min/1 73sq m     Narrative:      Patricia guidelines for Chronic Kidney Disease (CKD):     Stage 1 with normal or high GFR (GFR > 90 mL/min/1 73 square meters)    Stage 2 Mild CKD (GFR = 60-89 mL/min/1 73 square meters)    Stage 3A Moderate CKD (GFR = 45-59 mL/min/1 73 square meters)    Stage 3B Moderate CKD (GFR = 30-44 mL/min/1 73 square meters)    Stage 4 Severe CKD (GFR = 15-29 mL/min/1 73 square meters)    Stage 5 End Stage CKD (GFR <15 mL/min/1 73 square meters)  Note: GFR calculation is accurate only with a steady state creatinine    Magnesium [[id number]]  (Normal) Collected: 04/17/21 2117    Lab Status: Final result Specimen: Blood from Arm, Left Updated: 04/17/21 2200     Magnesium 2 0 mg/dL     NT-BNP PRO [[id number]]  (Abnormal) Collected: 04/17/21 2117    Lab Status: Final result Specimen: Blood from Arm, Left Updated: 04/17/21 2200     NT-proBNP 1,805 pg/mL     CK [[id number]]  (Normal) Collected: 04/17/21 2117    Lab Status: Final result Specimen: Blood from Arm, Left Updated: 04/17/21 2157     Total CK 41 U/L     Blood culture [[id number]] Collected: 04/17/21 2117    Lab Status: In process Specimen: Blood from Arm, Left Updated: 04/17/21 2154    Lactic acid, plasma [[id number]]  (Normal) Collected: 04/17/21 2117    Lab Status: Final result Specimen: Blood from Arm, Left Updated: 04/17/21 2152     LACTIC ACID 1 4 mmol/L     Narrative:      Result may be elevated if tourniquet was used during collection      Troponin I [[id number]]  (Normal) Collected: 04/17/21 2117    Lab Status: Final result Specimen: Blood from Arm, Left Updated: 04/17/21 2151     Troponin I <0 02 ng/mL     D-dimer, quantitative [[id number]]  (Abnormal) Collected: 04/17/21 2117    Lab Status: Final result Specimen: Blood from Arm, Left Updated: 04/17/21 2145     D-Dimer, Quant 1 69 ug/ml FEU     Protime-INR [[id number]]  (Normal) Collected: 04/17/21 2117    Lab Status: Final result Specimen: Blood from Arm, Left Updated: 04/17/21 2142     Protime 13 1 seconds      INR 0 99    APTT [[id number]]  (Normal) Collected: 04/17/21 2117    Lab Status: Final result Specimen: Blood from Arm, Left Updated: 04/17/21 2142     PTT 35 seconds     CBC and differential [[id number]]  (Abnormal) Collected: 04/17/21 2117    Lab Status: Final result Specimen: Blood from Arm, Left Updated: 04/17/21 2129     WBC 5 35 Thousand/uL      RBC 4 48 Million/uL      Hemoglobin 13 4 g/dL      Hematocrit 39 0 %      MCV 87 fL      MCH 29 9 pg      MCHC 34 4 g/dL      RDW 12 1 %      MPV 9 2 fL      Platelets 334 Thousands/uL      nRBC 0 /100 WBCs      Neutrophils Relative 80 %      Immat GRANS % 0 %      Lymphocytes Relative 15 %      Monocytes Relative 5 %      Eosinophils Relative 0 %      Basophils Relative 0 %      Neutrophils Absolute 4 25 Thousands/µL      Immature Grans Absolute 0 02 Thousand/uL      Lymphocytes Absolute 0 79 Thousands/µL      Monocytes Absolute 0 28 Thousand/µL      Eosinophils Absolute 0 00 Thousand/µL      Basophils Absolute 0 01 Thousands/µL     Calcium, ionized [[id number]] Collected: 04/17/21 2117    Lab Status: In process Specimen: Blood from Arm, Left Updated: 04/17/21 2124    Procalcitonin [[id number]] Collected: 04/17/21 2117    Lab Status: In process Specimen: Blood from Arm, Left Updated: 04/17/21 2124    Ferritin [[id number]] Collected: 04/17/21 2117    Lab Status: In process Specimen: Blood from Arm, Left Updated: 04/17/21 2124                 XR chest portable    (Results Pending)              Procedures  CriticalCare Time  Performed by: Zhang Khan DO  Authorized by:  Zhang Khan DO     Critical care provider statement:     Critical care time (minutes):  32    Critical care time was exclusive of:  Separately billable procedures and treating other patients and teaching time    Critical care was necessary to treat or prevent imminent or life-threatening deterioration of the following conditions:  Respiratory failure and sepsis    Critical care was time spent personally by me on the following activities:  Blood draw for specimens, obtaining history from patient or surrogate, development of treatment plan with patient or surrogate, discussions with primary provider, evaluation of patient's response to treatment, ordering and performing treatments and interventions, ordering and review of laboratory studies, ordering and review of radiographic studies and re-evaluation of patient's condition    I assumed direction of critical care for this patient from another provider in my specialty: no               ED Course                             SBIRT 20yo+      Most Recent Value   SBIRT (22 yo +)   In order to provide better care to our patients, we are screening all of our patients for alcohol and drug use  Would it be okay to ask you these screening questions? Yes Filed at: 04/17/2021 2131   Initial Alcohol Screen: US AUDIT-C    1  How often do you have a drink containing alcohol?  0 Filed at: 04/17/2021 2131   2  How many drinks containing alcohol do you have on a typical day you are drinking? 0 Filed at: 04/17/2021 2131   3a  Male UNDER 65: How often do you have five or more drinks on one occasion? 0 Filed at: 04/17/2021 2131   3b  FEMALE Any Age, or MALE 65+: How often do you have 4 or more drinks on one occassion? 0 Filed at: 04/17/2021 2131   Audit-C Score  0 Filed at: 04/17/2021 2131   ALMA: How many times in the past year have you    Used an illegal drug or used a prescription medication for non-medical reasons? Never Filed at: 04/17/2021 2131                    MDM  Number of Diagnoses or Management Options  COVID-19: new and requires workup  Hypoxia: new and requires workup  Diagnosis management comments: 50-year-old female with hypoxia and fatigue  Suspect COVID  Obtain COVID/sepsis evaluation  Administer antibiotics  Patient placed on 4 L nasal cannula with improvement of symptoms         Amount and/or Complexity of Data Reviewed  Clinical lab tests: ordered  Tests in the radiology section of CPT®: ordered  Tests in the medicine section of CPT®: ordered  Discussion of test results with the performing providers: yes  Decide to obtain previous medical records or to obtain history from someone other than the patient: yes  Obtain history from someone other than the patient: yes  Discuss the patient with other providers: yes  Independent visualization of images, tracings, or specimens: yes    Risk of Complications, Morbidity, and/or Mortality  Presenting problems: moderate  Diagnostic procedures: moderate  Management options: moderate    Patient Progress  Patient progress: stable      Disposition  Final diagnoses:   COVID-19   Hypoxia     Time reflects when diagnosis was documented in both MDM as applicable and the Disposition within this note     Time User Action Codes Description Comment    4/17/2021 10:24 PM Xuan De Jesus [U07 1] COVID-19     4/17/2021 10:25 PM Xuan De Jesus [R09 02] Hypoxia       ED Disposition     ED Disposition Condition Date/Time Comment    Admit Stable Sat Apr 17, 2021 10:24 PM Case was discussed with MELO and the patient's admission status was agreed to be Admission Status: inpatient status to the service of Dr Dom Ayon   Follow-up Information    None         Patient's Medications    No medications on file     No discharge procedures on file      PDMP Review     None          ED Provider  Electronically Signed by           Micki Toney DO  04/17/21 8299

## 2021-04-18 NOTE — QUICK NOTE
Nursing contacted patient's daughter to obtain medication list  Daughter not sure about what medications her mom takes  [facility]'s [facility] reports only medication on file is cilostazol 50 mg BID  Patient verbally reported that she takes oral hypoglycemic medication for type 2 diabetes but is unsure of medication name or dose  Denies BP/heart medications

## 2021-04-18 NOTE — ASSESSMENT & PLAN NOTE
· Likely chronic due to reduced PO intake vs SIADH from COVID pna  · Sodium 129 on admission, received IVF and now improved to 131   · Patient reports that she has been very fatigued and has had decreased p o  intake  · Ordered 500 mL bolus  · Continue to monitor

## 2021-04-18 NOTE — ASSESSMENT & PLAN NOTE
· Sodium 129 on admission   · Patient reports that she has been very fatigue and has had decreased p o  intake  · Order 500 mL bolus  · Continue to monitor

## 2021-04-18 NOTE — H&P
Timmy Bowden  H&P- Lourdes Grain 1940, 79 y o  female MRN: [id number]  Unit/Bed#: [facility] Encounter: [id number]  Primary Care Provider: Abraham Meyers MD   Date and time admitted to hospital: 4/17/2021  8:22 PM    * Pneumonia due to COVID-19 virus  Assessment & Plan  · Came in due to generalized fatigue over the past week  Patient had a COVID test outpatient but has been waiting for the results  · COVID positive in the ED  · 84% on room air, initially placed on 2 L now on 4 L  · Consider starting Actemra if continues to be over 4 L for 12 hours  · Cardiac markers   · Troponin negative  · CK normal 41  · BNP 1805  · Inflammatory markers   · CRP:  157 5  · D-dimer elevated at 1 69  · Ferritin pending  · In the ED patient received ceftriaxone, doxycycline and Decadron  · Start vitamin cocktail, Pepcid, remdesivir  · Start patient on atorvastatin 40 mg daily    Type 2 diabetes mellitus, without long-term current use of insulin ([facility] )  Assessment & Plan  No results found for: HGBA1C    No results for input(s): POCGLU in the last 72 hours  Blood Sugar Average: Last 72 hrs:    · Patient reports she takes oral diabetes medications at home, She is not sure which ones or doses  Denies current use of insulin or other injection medications  · Start SSI  · Order A1c    Hyponatremia  Assessment & Plan  · Sodium 129 on admission   · Patient reports that she has been very fatigue and has had decreased p o  intake  · Order 500 mL bolus  · Continue to monitor    VTE Prophylaxis: Enoxaparin (Lovenox)  / sequential compression device   Code Status: Level 1 Full Code   POLST: There is no POLST form on file for this patient (pre-hospital)  Discussion with family: No    Anticipated Length of Stay:  Patient will be admitted on an Inpatient basis with an anticipated length of stay of  > 2 midnights     Justification for Hospital Stay: Covid 19     Total Time for Visit, including Counseling / Coordination of Care: 45 minutes  Greater than 50% of this total time spent on direct patient counseling and coordination of care  Chief Complaint:   Generalized fatigue     History of Present Illness:    Eze England is a 79 y o  female with past medical history of type 2 diabetes who presents with generalized fatigue  Patient reports that she has been feeling fatigue and weak over the past week  Patient had gotten a COVID test a couple days ago and was still waiting on the results  Has had decreased appetite and poor intake of fluids  Patient denies feeling short of breath, chest pain, abdominal pain, fevers, chills, nausea, vomiting or diarrhea  Patient reports history of type 2 diabetes  Reports that she takes oral medications for diabetes but is unsure of name of the medication or the dose  She denies history of high blood pressure or heart problems  She is unsure of what other medications she takes  Review of Systems:    Review of Systems   Constitutional: Positive for appetite change and fatigue  Negative for fever  HENT: Negative for sore throat  Respiratory: Negative for cough, chest tightness and shortness of breath  Cardiovascular: Negative for chest pain  Gastrointestinal: Negative for abdominal distention, abdominal pain, diarrhea, nausea and vomiting  Genitourinary: Negative for difficulty urinating  Musculoskeletal: Negative for arthralgias  Neurological: Positive for weakness  Negative for headaches  Psychiatric/Behavioral: Negative for agitation and behavioral problems  All other systems reviewed and are negative  Past Medical and Surgical History:     Past Medical History:   Diagnosis Date    [facility]        History reviewed  No pertinent surgical history  Meds/Allergies:    Prior to Admission medications    Not on File     I have been unable to obtain / verify an up to date medication list despite all reasonable attempts      Allergies: No Known Allergies    Social History:     Marital Status: Unknown   Occupation: Retired   Patient Pre-hospital Living Situation: Home   Patient Pre-hospital Level of Mobility: Full  Patient Pre-hospital Diet Restrictions: Diabetic   Substance Use History:   Social History     Substance and Sexual Activity   Alcohol Use Not Currently     Social History     Tobacco Use   Smoking Status Former Smoker   Smokeless Tobacco Never Used     Social History     Substance and Sexual Activity   Drug Use Not Currently       Family History:    History reviewed  No pertinent family history  Physical Exam:     Vitals:   Blood Pressure: (!) 178/65 (04/17/21 2327)  Pulse: 66 (04/17/21 2327)  Temperature: 98 3 °F (36 8 °C) (04/17/21 2327)  Temp Source: Oral (04/17/21 2327)  Respirations: 18 (04/17/21 2327)  Height: 5' 2" (157 5 cm) (04/17/21 2327)  Weight - Scale: 77 2 kg (170 lb 3 1 oz) (04/17/21 2327)  SpO2: 92 % (04/17/21 2327)    Physical Exam  Vitals signs and nursing note reviewed  Constitutional:       Appearance: Normal appearance  HENT:      Head: Normocephalic  Eyes:      Extraocular Movements: Extraocular movements intact  Pupils: Pupils are equal, round, and reactive to light  Neck:      Musculoskeletal: Normal range of motion  Cardiovascular:      Rate and Rhythm: Normal rate and regular rhythm  Heart sounds: No murmur  Pulmonary:      Effort: Pulmonary effort is normal  No respiratory distress  Breath sounds: No wheezing  Comments: Decreased breath sounds   Abdominal:      General: Bowel sounds are normal  There is no distension  Tenderness: There is no abdominal tenderness  There is no guarding  Musculoskeletal: Normal range of motion  Right lower leg: No edema  Left lower leg: No edema  Skin:     General: Skin is warm  Neurological:      General: No focal deficit present  Mental Status: She is alert and oriented to person, place, and time     Psychiatric: Mood and Affect: Mood normal          Behavior: Behavior normal          Thought Content: Thought content normal          Additional Data:     Lab Results: I have personally reviewed pertinent reports  Results from last 7 days   Lab Units 04/17/21 2117   WBC Thousand/uL 5 35   HEMOGLOBIN g/dL 13 4   HEMATOCRIT % 39 0   PLATELETS Thousands/uL 275   NEUTROS PCT % 80*   LYMPHS PCT % 15   MONOS PCT % 5   EOS PCT % 0     Results from last 7 days   Lab Units 04/17/21 2117   SODIUM mmol/L 129*   POTASSIUM mmol/L 3 8   CHLORIDE mmol/L 93*   CO2 mmol/L 23   BUN mg/dL 14   CREATININE mg/dL 0 85   ANION GAP mmol/L 13   CALCIUM mg/dL 8 1*   ALBUMIN g/dL 2 4*   TOTAL BILIRUBIN mg/dL 0 60   ALK PHOS U/L 58   ALT U/L 41   AST U/L 33   GLUCOSE RANDOM mg/dL 155*     Results from last 7 days   Lab Units 04/17/21 2117   INR  0 99             Results from last 7 days   Lab Units 04/17/21 2117   LACTIC ACID mmol/L 1 4       Imaging: I have personally reviewed pertinent reports  XR chest portable    (Results Pending)         [facility] [place] [facility] Yes     ** Please Note: This note has been constructed using a voice recognition system   **

## 2021-04-18 NOTE — ASSESSMENT & PLAN NOTE
· Came in due to generalized fatigue over the past week  Patient had a COVID test outpatient but has been waiting for the results    · COVID positive in the ED  · 84% on room air, initially placed on 2 L now on 4 L  · Consider starting Actemra if continues to be over 4 L for 12 hours  · Cardiac markers   · Troponin negative  · CK normal 41  · BNP 1805  · Inflammatory markers   · CRP:  157 5  · D-dimer elevated at 1 69  · Ferritin pending  · In the ED patient received ceftriaxone, doxycycline and Decadron  · Start vitamin cocktail, Pepcid, remdesivir  · Start patient on atorvastatin 40 mg daily

## 2021-04-19 LAB
ATRIAL RATE: 63 BPM
CORTIS AM PEAK SERPL-MCNC: 4 UG/DL (ref 4.2–22.4)
GLUCOSE SERPL-MCNC: 195 MG/DL (ref 65–140)
GLUCOSE SERPL-MCNC: 228 MG/DL (ref 65–140)
GLUCOSE SERPL-MCNC: 237 MG/DL (ref 65–140)
GLUCOSE SERPL-MCNC: 238 MG/DL (ref 65–140)
P AXIS: 61 DEGREES
PR INTERVAL: 150 MS
QRS AXIS: -58 DEGREES
QRSD INTERVAL: 146 MS
QT INTERVAL: 468 MS
QTC INTERVAL: 478 MS
T WAVE AXIS: 80 DEGREES
VENTRICULAR RATE: 63 BPM

## 2021-04-19 PROCEDURE — 82533 TOTAL CORTISOL: CPT | Performed by: INTERNAL MEDICINE

## 2021-04-19 PROCEDURE — 93010 ELECTROCARDIOGRAM REPORT: CPT | Performed by: INTERNAL MEDICINE

## 2021-04-19 PROCEDURE — 94762 N-INVAS EAR/PLS OXIMTRY CONT: CPT

## 2021-04-19 PROCEDURE — 99233 SBSQ HOSP IP/OBS HIGH 50: CPT | Performed by: INTERNAL MEDICINE

## 2021-04-19 PROCEDURE — XW033H5 INTRODUCTION OF TOCILIZUMAB INTO PERIPHERAL VEIN, PERCUTANEOUS APPROACH, NEW TECHNOLOGY GROUP 5: ICD-10-PCS | Performed by: INTERNAL MEDICINE

## 2021-04-19 PROCEDURE — 82948 REAGENT STRIP/BLOOD GLUCOSE: CPT

## 2021-04-19 RX ORDER — INSULIN GLARGINE 100 [IU]/ML
8 INJECTION, SOLUTION SUBCUTANEOUS
Status: DISCONTINUED | OUTPATIENT
Start: 2021-04-19 | End: 2021-04-20

## 2021-04-19 RX ADMIN — REMDESIVIR 100 MG: 100 INJECTION, POWDER, LYOPHILIZED, FOR SOLUTION INTRAVENOUS at 09:10

## 2021-04-19 RX ADMIN — INSULIN LISPRO 2 UNITS: 100 INJECTION, SOLUTION INTRAVENOUS; SUBCUTANEOUS at 12:09

## 2021-04-19 RX ADMIN — DEXAMETHASONE SODIUM PHOSPHATE 6 MG: 4 INJECTION, SOLUTION INTRA-ARTICULAR; INTRALESIONAL; INTRAMUSCULAR; INTRAVENOUS; SOFT TISSUE at 20:24

## 2021-04-19 RX ADMIN — SENNOSIDES 17.2 MG: 8.6 TABLET, FILM COATED ORAL at 22:23

## 2021-04-19 RX ADMIN — INSULIN LISPRO 2 UNITS: 100 INJECTION, SOLUTION INTRAVENOUS; SUBCUTANEOUS at 22:17

## 2021-04-19 RX ADMIN — ZINC SULFATE 220 MG (50 MG) CAPSULE 220 MG: CAPSULE at 09:14

## 2021-04-19 RX ADMIN — ENOXAPARIN SODIUM 30 MG: 30 INJECTION SUBCUTANEOUS at 09:14

## 2021-04-19 RX ADMIN — TOCILIZUMAB 600 MG: 20 INJECTION, SOLUTION, CONCENTRATE INTRAVENOUS at 20:21

## 2021-04-19 RX ADMIN — GUAIFENESIN 600 MG: 600 TABLET ORAL at 20:23

## 2021-04-19 RX ADMIN — CILOSTAZOL 50 MG: 50 TABLET ORAL at 09:12

## 2021-04-19 RX ADMIN — BENZONATATE 100 MG: 100 CAPSULE ORAL at 09:09

## 2021-04-19 RX ADMIN — ATORVASTATIN CALCIUM 40 MG: 40 TABLET, FILM COATED ORAL at 22:22

## 2021-04-19 RX ADMIN — CILOSTAZOL 50 MG: 50 TABLET ORAL at 17:36

## 2021-04-19 RX ADMIN — INSULIN LISPRO 3 UNITS: 100 INJECTION, SOLUTION INTRAVENOUS; SUBCUTANEOUS at 17:36

## 2021-04-19 RX ADMIN — OXYCODONE HYDROCHLORIDE AND ACETAMINOPHEN 1000 MG: 500 TABLET ORAL at 09:10

## 2021-04-19 RX ADMIN — DOXYCYCLINE 100 MG: 100 CAPSULE ORAL at 09:10

## 2021-04-19 RX ADMIN — INSULIN LISPRO 3 UNITS: 100 INJECTION, SOLUTION INTRAVENOUS; SUBCUTANEOUS at 09:12

## 2021-04-19 RX ADMIN — INSULIN GLARGINE 8 UNITS: 100 INJECTION, SOLUTION SUBCUTANEOUS at 22:18

## 2021-04-19 RX ADMIN — ENOXAPARIN SODIUM 30 MG: 30 INJECTION SUBCUTANEOUS at 20:36

## 2021-04-19 RX ADMIN — FAMOTIDINE 20 MG: 20 TABLET ORAL at 09:10

## 2021-04-19 RX ADMIN — OXYCODONE HYDROCHLORIDE AND ACETAMINOPHEN 1000 MG: 500 TABLET ORAL at 20:23

## 2021-04-19 RX ADMIN — GUAIFENESIN 600 MG: 600 TABLET ORAL at 09:10

## 2021-04-19 RX ADMIN — Medication 2000 UNITS: at 09:09

## 2021-04-19 NOTE — ASSESSMENT & PLAN NOTE
Lab Results   Component Value Date    HGBA1C 8 8 (H) 04/17/2021       Recent Labs     04/18/21  2104 04/19/21  0722 04/19/21  1113 04/19/21  1734   POCGLU 207* 238* 228* 237*       Blood Sugar Average: Last 72 hrs:  (P) 219  · Patient reports she takes oral diabetes medications at home, She is not sure which ones or doses  Denies current use of insulin or other injection medications  · Continue SSI,  Will increase regimen  · Hemoglobin A1c suggest patient may need adjustment outpatient regimen     Recommend follow-up with outpatient provider after patient has completed course of steroids,  currently experiencing steroid induced hyperglycemia

## 2021-04-19 NOTE — PROGRESS NOTES
[place]  Progress Note - Naheed Mo 1940, 79 y o  female MRN: [id number]  Unit/Bed#: -[facility] Encounter: [id number]  Primary Care Provider: Eulalia Shah MD   Date and time admitted to hospital: 4/17/2021  8:22 PM    * Pneumonia due to COVID-19 virus  Assessment & Plan  · Came in due to generalized fatigue over the past week  Patient had a COVID test outpatient but has been waiting for the results  · COVID positive in the ED  · 84% on room air, initially placed on 2 L now on 5 L,  If patient requires increasing oxygen, consult Pulmonary team   ·   Ordered Actemra 4/19  · Cardiac markers   · Troponin negative  · CK normal 41  · BNP 1805  · Inflammatory markers   · CRP:  157 5  · D-dimer elevated at 1 69  · Ferritin pending  · In the ED patient received ceftriaxone, doxycycline and Decadron  · Continue vitamin cocktail, Pepcid, remdesivir  · Continue patient on atorvastatin 40 mg daily    Type 2 diabetes mellitus, without long-term current use of insulin [facility])  Assessment & Plan  Lab Results   Component Value Date    HGBA1C 8 8 (H) 04/17/2021       Recent Labs     04/18/21  2104 04/19/21  0722 04/19/21  1113 04/19/21  1734   POCGLU 207* 238* 228* 237*       Blood Sugar Average: Last 72 hrs:  (P) 219  · Patient reports she takes oral diabetes medications at home, She is not sure which ones or doses  Denies current use of insulin or other injection medications  · Continue SSI,  Will increase regimen  · Hemoglobin A1c suggest patient may need adjustment outpatient regimen  Recommend follow-up with outpatient provider after patient has completed course of steroids,  currently experiencing steroid induced hyperglycemia    Hyponatremia  Assessment & Plan  · Likely chronic due to reduced PO intake vs SIADH from COVID pna  · Sodium 129 on admission, received IVF and now improved to 131   · Patient reports that she has been very fatigued and has had decreased p o  intake  · Ordered 500 mL bolus  · Continue to monitor      VTE Pharmacologic Prophylaxis:   Pharmacologic: Enoxaparin (Lovenox)  Mechanical VTE Prophylaxis in Place: Yes    Patient Centered Rounds: I have performed bedside rounds with nursing staff today  Discussions with Specialists or Other Care Team Provider: Discussed with CM and with nursing, if patient has increasing O2 requirements, Pulmonology needs to be contacted overnight  Education and Discussions with Family / Patient: Called patient's daughter    Time Spent for Care: 30 minutes  More than 50% of total time spent on counseling and coordination of care as described above  Current Length of Stay: 2 day(s)    Current Patient Status: Inpatient   Certification Statement: The patient will continue to require additional inpatient hospital stay due to Increased oxygen requirements, IV treatment of COVID-19    Discharge Plan: Patient comes from home, PT/OT to evaluate when patient more clinically stable  Code Status: Level 1 - Full Code      Subjective:   Patient reports a poor appetite but denies feeling SOB while on supplemental O2  Objective:     Vitals:   Temp (24hrs), Av 9 °F (36 6 °C), Min:97 6 °F (36 4 °C), Max:98 1 °F (36 7 °C)    Temp:  [97 6 °F (36 4 °C)-98 1 °F (36 7 °C)] 97 6 °F (36 4 °C)  HR:  [60-67] 67  BP: (163-165)/(71-93) 163/71  SpO2:  [89 %-96 %] 96 %  Body mass index is 31 13 kg/m²  Input and Output Summary (last 24 hours): Intake/Output Summary (Last 24 hours) at 2021 1749  Last data filed at 2021 1401  Gross per 24 hour   Intake 180 ml   Output 450 ml   Net -270 ml       Physical Exam:     Physical Exam  Constitutional:       General: She is not in acute distress  Appearance: Normal appearance  She is well-developed  HENT:      Head: Normocephalic and atraumatic  Eyes:      General: No scleral icterus       Conjunctiva/sclera: Conjunctivae normal    Cardiovascular:      Rate and Rhythm: Normal rate and regular rhythm  Heart sounds: No murmur  Pulmonary:      Effort: Pulmonary effort is normal       Breath sounds: Decreased air movement present  Decreased breath sounds present  No wheezing, rhonchi or rales  Abdominal:      General: There is no distension  Palpations: Abdomen is soft  Skin:     General: Skin is warm and dry  Neurological:      General: No focal deficit present  Mental Status: She is alert  Psychiatric:         Mood and Affect: Mood normal          Additional Data:     Labs:    Results from last 7 days   Lab Units 04/18/21  0611 04/17/21 2117   WBC Thousand/uL 3 42* 5 35   HEMOGLOBIN g/dL 12 9 13 4   HEMATOCRIT % 38 2 39 0   PLATELETS Thousands/uL 307 275   BANDS PCT % 5  --    NEUTROS PCT %  --  80*   LYMPHS PCT %  --  15   LYMPHO PCT % 12*  --    MONOS PCT %  --  5   MONO PCT % 0*  --    EOS PCT % 0 0     Results from last 7 days   Lab Units 04/18/21  0611   SODIUM mmol/L 131*   POTASSIUM mmol/L 4 0   CHLORIDE mmol/L 96*   CO2 mmol/L 19*   BUN mg/dL 16   CREATININE mg/dL 0 71   ANION GAP mmol/L 16*   CALCIUM mg/dL 8 4   ALBUMIN g/dL 2 4*   TOTAL BILIRUBIN mg/dL 0 50   ALK PHOS U/L 59   ALT U/L 35   AST U/L 33   GLUCOSE RANDOM mg/dL 206*     Results from last 7 days   Lab Units 04/17/21 2117   INR  0 99     Results from last 7 days   Lab Units 04/19/21  1734 04/19/21  1113 04/19/21  0722 04/18/21  2104 04/18/21  1714 04/18/21  1107 04/18/21  0752 04/18/21  0022   POC GLUCOSE mg/dl 237* 228* 238* 207* 191* 255* 231* 165*     Results from last 7 days   Lab Units 04/17/21 2117   HEMOGLOBIN A1C % 8 8*     Results from last 7 days   Lab Units 04/18/21  0630 04/17/21 2117   LACTIC ACID mmol/L  --  1 4   PROCALCITONIN ng/ml <0 05 <0 05           * I Have Reviewed All Lab Data Listed Above  * Additional Pertinent Lab Tests Reviewed:  All Labs Within Last 24 Hours Reviewed    Imaging:    Imaging Reports Reviewed Today Include:   · CXR 4/17: Hazy infiltrate throughout the right lung most prominent in right parahilar position  Probable left basilar infiltrate  Findings are compatible with Covid pneumonia  Imaging Personally Reviewed by Myself Includes:  None    Recent Cultures (last 7 days):     Results from last 7 days   Lab Units 04/17/21 2155 04/17/21 2117   BLOOD CULTURE  No Growth at 24 hrs  No Growth at 24 hrs         Last 24 Hours Medication List:   Current Facility-Administered Medications   Medication Dose Route Frequency Provider Last Rate    acetaminophen  650 mg Oral Q6H PRN Pia Lundy PA-C      ascorbic acid  1,000 mg Oral Q12H [address] Pia Lundy PA-C      atorvastatin  40 mg Oral HS Pia Lundy PA-C      benzonatate  100 mg Oral TID PRN Marc Hart MD      cefTRIAXone  1,000 mg Intravenous Q24H Pia Lundy PA-C 1,000 mg (04/18/21 2107)    cholecalciferol  2,000 Units Oral Daily Pia Lundy PA-C      cilostazol  50 mg Oral BID Marc Hart MD      dexamethasone  6 mg Intravenous Q24H Pia Lundy PA-C      docusate sodium  100 mg Oral BID Marc Hart MD      doxycycline hyclate  100 mg Oral Q12H Pia Lundy PA-C      enoxaparin  30 mg Subcutaneous Q12H [address] Pia Lundy PA-C      famotidine  20 mg Oral Daily Pia Lundy PA-C      guaiFENesin  600 mg Oral Q12H [address] Marc Hart MD      insulin lispro  1-6 Units Subcutaneous HS Cinthya PÉREZ PA-C      insulin lispro  2-12 Units Subcutaneous TID AC Cinthya PÉREZ PA-C      zinc sulfate  220 mg Oral Daily Brook Marquez PA-C      Followed by   Jaqui Reed ON 4/25/2021] multivitamin-minerals  1 tablet Oral Daily Brook Marquez PA-C      ondansetron  4 mg Intravenous Q6H PRN Pia Lundy PA-C      polyethylene glycol  17 g Oral Daily Marc Hart MD      remdesivir  100 mg Intravenous Q24H Pia Lundy PA-C      senna  2 tablet Oral HS Marc Hart MD      sodium chloride  75 mL/hr Intravenous Continuous Marc Hatr MD 75 mL/hr (04/18/21 2125)    tocilizumab (ACTEMRA) 100 mL IVPB  600 mg Intravenous Once Daylisa Elliott PA-C          Today, Patient Was Seen By: Dani Hernandez PA-C    ** Please Note: Dictation voice to text software may have been used in the creation of this document   **

## 2021-04-19 NOTE — CASE MANAGEMENT
LOS: 2 days  Outside patient room and called via phone [phone] per positive COVID protocol to review discharge process    Patient with increased SOB when talking also caused coughing and de-sating  CM introduced self and will return tomorrow to discuss discharge palnning

## 2021-04-19 NOTE — PLAN OF CARE
Problem: Potential for Falls  Goal: Patient will remain free of falls  Description: INTERVENTIONS:  - Assess patient frequently for physical needs  -  Identify cognitive and physical deficits and behaviors that affect risk of falls  -  [place] fall precautions as indicated by assessment   - Educate patient/family on patient safety including physical limitations  - Instruct patient to call for assistance with activity based on assessment  - Modify environment to reduce risk of injury  - Consider OT/PT consult to assist with strengthening/mobility  Outcome: Progressing     Problem: Nutrition/Hydration-ADULT  Goal: Nutrient/Hydration intake appropriate for improving, restoring or maintaining nutritional needs  Description: Monitor and assess patient's nutrition/hydration status for malnutrition  Collaborate with interdisciplinary team and initiate plan and interventions as ordered  Monitor patient's weight and dietary intake as ordered or per policy  Utilize nutrition screening tool and intervene as necessary  Determine patient's food preferences and provide high-protein, high-caloric foods as appropriate       INTERVENTIONS:  - Monitor oral intake, urinary output, labs, and treatment plans  - Assess nutrition and hydration status and recommend course of action  - Evaluate amount of meals eaten  - Assist patient with eating if necessary   - Allow adequate time for meals  - Recommend/ encourage appropriate diets, oral nutritional supplements, and vitamin/mineral supplements  - Order, calculate, and assess calorie counts as needed  - Recommend, monitor, and adjust tube feedings and TPN/PPN based on assessed needs  - Assess need for intravenous fluids  - Provide specific nutrition/hydration education as appropriate  - Include patient/family/caregiver in decisions related to nutrition  Outcome: Progressing     Problem: PAIN - ADULT  Goal: Verbalizes/displays adequate comfort level or baseline comfort level  Description: Interventions:  - Encourage patient to monitor pain and request assistance  - Assess pain using appropriate pain scale  - Administer analgesics based on type and severity of pain and evaluate response  - Implement non-pharmacological measures as appropriate and evaluate response  - Consider cultural and social influences on pain and pain management  - Notify physician/advanced practitioner if interventions unsuccessful or patient reports new pain  Outcome: Progressing     Problem: INFECTION - ADULT  Goal: Absence or prevention of progression during hospitalization  Description: INTERVENTIONS:  - Assess and monitor for signs and symptoms of infection  - Monitor lab/diagnostic results  - Monitor all insertion sites, i e  indwelling lines, tubes, and drains  - Monitor endotracheal if appropriate and nasal secretions for changes in amount and color  - [place] appropriate cooling/warming therapies per order  - Administer medications as ordered  - Instruct and encourage patient and family to use good hand hygiene technique  - Identify and instruct in appropriate isolation precautions for identified infection/condition  Outcome: Progressing     Problem: SAFETY ADULT  Goal: Patient will remain free of falls  Description: INTERVENTIONS:  - Assess patient frequently for physical needs  -  Identify cognitive and physical deficits and behaviors that affect risk of falls    -  [place] fall precautions as indicated by assessment   - Educate patient/family on patient safety including physical limitations  - Instruct patient to call for assistance with activity based on assessment  - Modify environment to reduce risk of injury  - Consider OT/PT consult to assist with strengthening/mobility  Outcome: Progressing  Goal: Maintain or return to baseline ADL function  Description: INTERVENTIONS:  -  Assess patient's ability to carry out ADLs; assess patient's baseline for ADL function and identify physical deficits which impact ability to perform ADLs (bathing, care of mouth/teeth, toileting, grooming, dressing, etc )  - Assess/evaluate cause of self-care deficits   - Assess range of motion  - Assess patient's mobility; develop plan if impaired  - Assess patient's need for assistive devices and provide as appropriate  - Encourage maximum independence but intervene and supervise when necessary  - Involve family in performance of ADLs  - Assess for home care needs following discharge   - Consider OT consult to assist with ADL evaluation and planning for discharge  - Provide patient education as appropriate  Outcome: Progressing  Goal: Maintain or return mobility status to optimal level  Description: INTERVENTIONS:  - Assess patient's baseline mobility status (ambulation, transfers, stairs, etc )    - Identify cognitive and physical deficits and behaviors that affect mobility  - Identify mobility aids required to assist with transfers and/or ambulation (gait belt, sit-to-stand, lift, walker, cane, etc )  - [place] fall precautions as indicated by assessment  - Record patient progress and toleration of activity level on Mobility SBAR; progress patient to next Phase/Stage  - Instruct patient to call for assistance with activity based on assessment  - Consider rehabilitation consult to assist with strengthening/weightbearing, etc   Outcome: Progressing     Problem: DISCHARGE PLANNING  Goal: Discharge to home or other facility with appropriate resources  Description: INTERVENTIONS:  - Identify barriers to discharge w/patient and caregiver  - Arrange for needed discharge resources and transportation as appropriate  - Identify discharge learning needs (meds, wound care, etc )  - Arrange for interpretive services to assist at discharge as needed  - Refer to Case Management Department for coordinating discharge planning if the patient needs post-hospital services based on physician/advanced practitioner order or complex needs related to functional status, cognitive ability, or social support system  Outcome: Progressing     Problem: Knowledge Deficit  Goal: Patient/family/caregiver demonstrates understanding of disease process, treatment plan, medications, and discharge instructions  Description: Complete learning assessment and assess knowledge base    Interventions:  - Provide teaching at level of understanding  - Provide teaching via preferred learning methods  Outcome: Progressing     Problem: RESPIRATORY - ADULT  Goal: Achieves optimal ventilation and oxygenation  Description: INTERVENTIONS:  - Assess for changes in respiratory status  - Assess for changes in mentation and behavior  - Position to facilitate oxygenation and minimize respiratory effort  - Oxygen administered by appropriate delivery if ordered  - Initiate smoking cessation education as indicated  - Encourage broncho-pulmonary hygiene including cough, deep breathe, Incentive Spirometry  - Assess the need for suctioning and aspirate as needed  - Assess and instruct to report SOB or any respiratory difficulty  - Respiratory Therapy support as indicated  Outcome: Progressing     Problem: MUSCULOSKELETAL - ADULT  Goal: Maintain or return mobility to safest level of function  Description: INTERVENTIONS:  - Assess patient's ability to carry out ADLs; assess patient's baseline for ADL function and identify physical deficits which impact ability to perform ADLs (bathing, care of mouth/teeth, toileting, grooming, dressing, etc )  - Assess/evaluate cause of self-care deficits   - Assess range of motion  - Assess patient's mobility  - Assess patient's need for assistive devices and provide as appropriate  - Encourage maximum independence but intervene and supervise when necessary  - Involve family in performance of ADLs  - Assess for home care needs following discharge   - Consider OT consult to assist with ADL evaluation and planning for discharge  - Provide patient education as appropriate  Outcome: Progressing

## 2021-04-19 NOTE — ASSESSMENT & PLAN NOTE
· Came in due to generalized fatigue over the past week  Patient had a COVID test outpatient but has been waiting for the results    · COVID positive in the ED  · 84% on room air, initially placed on 2 L now on 5 L,  If patient requires increasing oxygen, consult Pulmonary team   ·   Ordered Actemra 4/19  · Cardiac markers   · Troponin negative  · CK normal 41  · BNP 1805  · Inflammatory markers   · CRP:  157 5  · D-dimer elevated at 1 69  · Ferritin pending  · In the ED patient received ceftriaxone, doxycycline and Decadron  · Continue vitamin cocktail, Pepcid, remdesivir  · Continue patient on atorvastatin 40 mg daily

## 2021-04-19 NOTE — PROGRESS NOTES
This RN explained to patient the importance of Proning with having the Covid symptoms  Pt states, " Unable to lie on belly"  Pt agreed with oob to chair  WCTM

## 2021-04-20 PROBLEM — J96.01 ACUTE RESPIRATORY FAILURE WITH HYPOXIA (HCC): Status: ACTIVE | Noted: 2021-04-20

## 2021-04-20 LAB
ALBUMIN SERPL BCP-MCNC: 2.2 G/DL (ref 3.5–5)
ALP SERPL-CCNC: 57 U/L (ref 46–116)
ALT SERPL W P-5'-P-CCNC: 25 U/L (ref 12–78)
ANION GAP SERPL CALCULATED.3IONS-SCNC: 16 MMOL/L (ref 4–13)
AST SERPL W P-5'-P-CCNC: 21 U/L (ref 5–45)
BILIRUB SERPL-MCNC: 0.4 MG/DL (ref 0.2–1)
BUN SERPL-MCNC: 21 MG/DL (ref 5–25)
CALCIUM ALBUM COR SERPL-MCNC: 9.7 MG/DL (ref 8.3–10.1)
CALCIUM SERPL-MCNC: 8.3 MG/DL (ref 8.3–10.1)
CHLORIDE SERPL-SCNC: 106 MMOL/L (ref 100–108)
CO2 SERPL-SCNC: 19 MMOL/L (ref 21–32)
CREAT SERPL-MCNC: 0.67 MG/DL (ref 0.6–1.3)
CRP SERPL QL: 58.4 MG/L
D DIMER PPP FEU-MCNC: 0.68 UG/ML FEU
ERYTHROCYTE [DISTWIDTH] IN BLOOD BY AUTOMATED COUNT: 12.6 % (ref 11.6–15.1)
FERRITIN SERPL-MCNC: 308 NG/ML (ref 8–388)
GFR SERPL CREATININE-BSD FRML MDRD: 82 ML/MIN/1.73SQ M
GLUCOSE SERPL-MCNC: 184 MG/DL (ref 65–140)
GLUCOSE SERPL-MCNC: 185 MG/DL (ref 65–140)
GLUCOSE SERPL-MCNC: 208 MG/DL (ref 65–140)
GLUCOSE SERPL-MCNC: 231 MG/DL (ref 65–140)
HCT VFR BLD AUTO: 37.1 % (ref 34.8–46.1)
HGB BLD-MCNC: 12.3 G/DL (ref 11.5–15.4)
MCH RBC QN AUTO: 30.1 PG (ref 26.8–34.3)
MCHC RBC AUTO-ENTMCNC: 33.2 G/DL (ref 31.4–37.4)
MCV RBC AUTO: 91 FL (ref 82–98)
NRBC BLD AUTO-RTO: 0 /100 WBCS
PLATELET # BLD AUTO: 416 THOUSANDS/UL (ref 149–390)
PMV BLD AUTO: 9.3 FL (ref 8.9–12.7)
POTASSIUM SERPL-SCNC: 3.9 MMOL/L (ref 3.5–5.3)
PROT SERPL-MCNC: 5.9 G/DL (ref 6.4–8.2)
RBC # BLD AUTO: 4.09 MILLION/UL (ref 3.81–5.12)
SODIUM SERPL-SCNC: 141 MMOL/L (ref 136–145)
WBC # BLD AUTO: 6.03 THOUSAND/UL (ref 4.31–10.16)

## 2021-04-20 PROCEDURE — 99232 SBSQ HOSP IP/OBS MODERATE 35: CPT | Performed by: INTERNAL MEDICINE

## 2021-04-20 PROCEDURE — 99223 1ST HOSP IP/OBS HIGH 75: CPT | Performed by: INTERNAL MEDICINE

## 2021-04-20 PROCEDURE — 85027 COMPLETE CBC AUTOMATED: CPT | Performed by: PHYSICIAN ASSISTANT

## 2021-04-20 PROCEDURE — 94762 N-INVAS EAR/PLS OXIMTRY CONT: CPT

## 2021-04-20 PROCEDURE — 80053 COMPREHEN METABOLIC PANEL: CPT | Performed by: PHYSICIAN ASSISTANT

## 2021-04-20 PROCEDURE — 85379 FIBRIN DEGRADATION QUANT: CPT | Performed by: PHYSICIAN ASSISTANT

## 2021-04-20 PROCEDURE — 86140 C-REACTIVE PROTEIN: CPT | Performed by: PHYSICIAN ASSISTANT

## 2021-04-20 PROCEDURE — 82948 REAGENT STRIP/BLOOD GLUCOSE: CPT

## 2021-04-20 PROCEDURE — NC001 PR NO CHARGE: Performed by: INTERNAL MEDICINE

## 2021-04-20 PROCEDURE — 82728 ASSAY OF FERRITIN: CPT | Performed by: PHYSICIAN ASSISTANT

## 2021-04-20 PROCEDURE — 94760 N-INVAS EAR/PLS OXIMETRY 1: CPT

## 2021-04-20 RX ORDER — INSULIN GLARGINE 100 [IU]/ML
10 INJECTION, SOLUTION SUBCUTANEOUS
Status: DISCONTINUED | OUTPATIENT
Start: 2021-04-20 | End: 2021-04-27 | Stop reason: HOSPADM

## 2021-04-20 RX ORDER — FUROSEMIDE 10 MG/ML
40 INJECTION INTRAMUSCULAR; INTRAVENOUS ONCE
Status: COMPLETED | OUTPATIENT
Start: 2021-04-20 | End: 2021-04-20

## 2021-04-20 RX ADMIN — FAMOTIDINE 20 MG: 20 TABLET ORAL at 09:24

## 2021-04-20 RX ADMIN — CILOSTAZOL 50 MG: 50 TABLET ORAL at 09:24

## 2021-04-20 RX ADMIN — INSULIN LISPRO 4 UNITS: 100 INJECTION, SOLUTION INTRAVENOUS; SUBCUTANEOUS at 13:43

## 2021-04-20 RX ADMIN — REMDESIVIR 100 MG: 100 INJECTION, POWDER, LYOPHILIZED, FOR SOLUTION INTRAVENOUS at 09:25

## 2021-04-20 RX ADMIN — CILOSTAZOL 50 MG: 50 TABLET ORAL at 17:20

## 2021-04-20 RX ADMIN — INSULIN GLARGINE 10 UNITS: 100 INJECTION, SOLUTION SUBCUTANEOUS at 22:45

## 2021-04-20 RX ADMIN — DEXAMETHASONE SODIUM PHOSPHATE 6 MG: 4 INJECTION, SOLUTION INTRA-ARTICULAR; INTRALESIONAL; INTRAMUSCULAR; INTRAVENOUS; SOFT TISSUE at 22:40

## 2021-04-20 RX ADMIN — ATORVASTATIN CALCIUM 40 MG: 40 TABLET, FILM COATED ORAL at 22:38

## 2021-04-20 RX ADMIN — INSULIN LISPRO 1 UNITS: 100 INJECTION, SOLUTION INTRAVENOUS; SUBCUTANEOUS at 22:46

## 2021-04-20 RX ADMIN — INSULIN LISPRO 4 UNITS: 100 INJECTION, SOLUTION INTRAVENOUS; SUBCUTANEOUS at 09:30

## 2021-04-20 RX ADMIN — Medication 2000 UNITS: at 09:23

## 2021-04-20 RX ADMIN — ZINC SULFATE 220 MG (50 MG) CAPSULE 220 MG: CAPSULE at 09:25

## 2021-04-20 RX ADMIN — INSULIN LISPRO 2 UNITS: 100 INJECTION, SOLUTION INTRAVENOUS; SUBCUTANEOUS at 17:24

## 2021-04-20 RX ADMIN — GUAIFENESIN 600 MG: 600 TABLET ORAL at 09:25

## 2021-04-20 RX ADMIN — FUROSEMIDE 40 MG: 10 INJECTION, SOLUTION INTRAMUSCULAR; INTRAVENOUS at 13:48

## 2021-04-20 RX ADMIN — ENOXAPARIN SODIUM 30 MG: 30 INJECTION SUBCUTANEOUS at 09:24

## 2021-04-20 RX ADMIN — OXYCODONE HYDROCHLORIDE AND ACETAMINOPHEN 1000 MG: 500 TABLET ORAL at 22:39

## 2021-04-20 RX ADMIN — ENOXAPARIN SODIUM 30 MG: 30 INJECTION SUBCUTANEOUS at 22:39

## 2021-04-20 RX ADMIN — GUAIFENESIN 600 MG: 600 TABLET ORAL at 22:39

## 2021-04-20 RX ADMIN — OXYCODONE HYDROCHLORIDE AND ACETAMINOPHEN 1000 MG: 500 TABLET ORAL at 09:23

## 2021-04-20 RX ADMIN — INSULIN LISPRO 3 UNITS: 100 INJECTION, SOLUTION INTRAVENOUS; SUBCUTANEOUS at 09:25

## 2021-04-20 RX ADMIN — INSULIN LISPRO 3 UNITS: 100 INJECTION, SOLUTION INTRAVENOUS; SUBCUTANEOUS at 13:43

## 2021-04-20 NOTE — CASE MANAGEMENT
LOS: 3 days  Bundle: No  Unplanned Readmission Score: 11 Green  30 Day Readmission: No     Spoke with patient via phone in room [phone] per positive COVID protocol to review discharge process  Home: Apt  2 steps in  Lives With: Alone has 3 local supportive daughters  ADL'sIndependent  DME: None  Ambulation: Self  Transportation: Self  Pharmacy: [facility]  PCP: Roger Jacques MD  [facility] Hx: No  Rehab Hx: NO  Mental Health Hx: No  Substance Abuse Hx: No  Employment:Retired  POA/LW/AD: Daughter Shannan Sutton [place]/Yes/Yes   Transport at D/C: Daughter    CM reviewed d/c planning process including the following: identifying help at home, patient preferences for d/c planning needs, availability of treatment team to discuss questions or concerns patient and/or family may have regarding understanding medications and recognizing signs and symptoms once discharged  Patient requested CM call dtr Shannan Sutton to review CM role  TC to Shannan Sutton [phone] discussed Discharge planning  Daughters will assist at DC  Waiting on PT/OT recommendations  Discussed with Shannan Sutton and patient Home Care services  [facility] of agency is [facility]  Referral sent via [address]  Cm department will follow through discharge

## 2021-04-20 NOTE — ASSESSMENT & PLAN NOTE
Lab Results   Component Value Date    HGBA1C 8 8 (H) 04/17/2021       Recent Labs     04/19/21  0722 04/19/21  1113 04/19/21  1734 04/19/21  2216   POCGLU 238* 228* 237* 195*       Blood Sugar Average: Last 72 hrs:  (P) 639 [id number]  · Patient reports she takes oral diabetes medications at home, She is not sure which ones or doses  Denies current use of insulin or other injection medications  · Continue SSI,  Will increase regimen  · Hemoglobin A1c suggest patient may need adjustment outpatient regimen     Recommend follow-up with outpatient provider after patient has completed course of steroids,  currently experiencing steroid induced hyperglycemia

## 2021-04-20 NOTE — PROGRESS NOTES
[place]  Progress Note - Angie [facility] 1940, 79 y o  female MRN: [id number]  Unit/Bed#: -01 Encounter: [id number]  Primary Care Provider: Manuel Rodas MD   Date and time admitted to hospital: 4/17/2021  8:22 PM    * Pneumonia due to COVID-19 virus  Assessment & Plan  · Came in due to generalized fatigue over the past week  Patient had a COVID test outpatient but has been waiting for the results  · COVID positive in the ED  · 84% on room air, initially placed on 2 L now on 5 L,  If patient requires increasing oxygen, consult Pulmonary team   ·   Ordered Actemra 4/19  · Cardiac markers   · Troponin negative  · CK normal 41  · BNP 1805  · Inflammatory markers   · CRP:  157 5  · D-dimer elevated at 1 69  · Ferritin pending  · In the ED patient received ceftriaxone, doxycycline and Decadron  · Continue vitamin cocktail, Pepcid, remdesivir  · Continue patient on atorvastatin 40 mg daily    Acute respiratory failure with hypoxia ([address] )  Assessment & Plan  · In the setting of COVID-19  · Continue supplemental oxygen     Type 2 diabetes mellitus, without long-term current use of insulin [facility])  Assessment & Plan  Lab Results   Component Value Date    HGBA1C 8 8 (H) 04/17/2021       Recent Labs     04/19/21  0722 04/19/21  1113 04/19/21  1734 04/19/21  2216   POCGLU 238* 228* 237* 195*       Blood Sugar Average: Last 72 hrs:  (P) 365 [id number]  · Patient reports she takes oral diabetes medications at home, She is not sure which ones or doses  Denies current use of insulin or other injection medications  · Continue SSI,  Will increase regimen  · Hemoglobin A1c suggest patient may need adjustment outpatient regimen     Recommend follow-up with outpatient provider after patient has completed course of steroids,  currently experiencing steroid induced hyperglycemia    Hyponatremia  Assessment & Plan  · Likely chronic due to reduced PO intake vs SIADH from COVID pna  · Sodium 129 on admission, received IVF and now improved to 131   · Patient reports that she has been very fatigued and has had decreased p o  intake  · Ordered 500 mL bolus  · Continue to monitor      VTE Pharmacologic Prophylaxis:   Pharmacologic: Enoxaparin (Lovenox)  Mechanical VTE Prophylaxis in Place: Yes    Patient Centered Rounds: I have performed bedside rounds with nursing staff today  Discussions with Specialists or Other Care Team Provider: Discussed with Pulmonary team who will see today due to increased O2 requirement  Education and Discussions with Family / Patient: Patient declined family update, has been speaking with family herself    Time Spent for Care: 30 minutes  More than 50% of total time spent on counseling and coordination of care as described above  Current Length of Stay: 3 day(s)    Current Patient Status: Inpatient   Certification Statement: The patient will continue to require additional inpatient hospital stay due to Increased O2 requirement, management of COVID    Discharge Plan: Patient comes from home, will have PT/OT evaluate when clinically improved  Code Status: Level 1 - Full Code      Subjective:   Patient continues to deny SOB, but she does know her O2 saturation dropped and she is now requiring more oxygen  She continues to reports she has limited appetite  Objective:     Vitals:   Temp (24hrs), Av 6 °F (36 4 °C), Min:97 6 °F (36 4 °C), Max:97 6 °F (36 4 °C)    Temp:  [97 6 °F (36 4 °C)] 97 6 °F (36 4 °C)  HR:  [] 94  Resp:  [14] 14  BP: (138-171)/(51-82) 171/82  SpO2:  [78 %-97 %] 90 %  Body mass index is 31 13 kg/m²  Input and Output Summary (last 24 hours): Intake/Output Summary (Last 24 hours) at 2021 1207  Last data filed at 2021 0800  Gross per 24 hour   Intake 480 ml   Output 300 ml   Net 180 ml       Physical Exam:     Physical Exam  Vitals signs reviewed  Constitutional:       General: She is not in acute distress       Appearance: Normal appearance  She is well-developed  Interventions: Nasal cannula in place  HENT:      Head: Normocephalic and atraumatic  Eyes:      General: No scleral icterus  Conjunctiva/sclera: Conjunctivae normal    Cardiovascular:      Rate and Rhythm: Normal rate and regular rhythm  Heart sounds: No murmur  Pulmonary:      Effort: Pulmonary effort is normal       Breath sounds: Decreased air movement present  Decreased breath sounds present  No wheezing, rhonchi or rales  Abdominal:      General: There is no distension  Palpations: Abdomen is soft  Skin:     General: Skin is warm and dry  Neurological:      General: No focal deficit present  Mental Status: She is alert     Psychiatric:         Mood and Affect: Mood normal        Additional Data:     Labs:    Results from last 7 days   Lab Units 04/20/21  0542 04/18/21  0611 04/17/21 2117   WBC Thousand/uL 6 03 3 42* 5 35   HEMOGLOBIN g/dL 12 3 12 9 13 4   HEMATOCRIT % 37 1 38 2 39 0   PLATELETS Thousands/uL 416* 307 275   BANDS PCT %  --  5  --    NEUTROS PCT %  --   --  80*   LYMPHS PCT %  --   --  15   LYMPHO PCT %  --  12*  --    MONOS PCT %  --   --  5   MONO PCT %  --  0*  --    EOS PCT %  --  0 0     Results from last 7 days   Lab Units 04/20/21  0542   SODIUM mmol/L 141   POTASSIUM mmol/L 3 9   CHLORIDE mmol/L 106   CO2 mmol/L 19*   BUN mg/dL 21   CREATININE mg/dL 0 67   ANION GAP mmol/L 16*   CALCIUM mg/dL 8 3   ALBUMIN g/dL 2 2*   TOTAL BILIRUBIN mg/dL 0 40   ALK PHOS U/L 57   ALT U/L 25   AST U/L 21   GLUCOSE RANDOM mg/dL 208*     Results from last 7 days   Lab Units 04/17/21 2117   INR  0 99     Results from last 7 days   Lab Units 04/19/21  2216 04/19/21  1734 04/19/21  1113 04/19/21  0722 04/18/21  2104 04/18/21  1714 04/18/21  1107 04/18/21  0752 04/18/21  0022   POC GLUCOSE mg/dl 195* 237* 228* 238* 207* 191* 255* 231* 165*     Results from last 7 days   Lab Units 04/17/21 2117   HEMOGLOBIN A1C % 8 8*     Results from last 7 days   Lab Units 04/18/21  0630 04/17/21 2117   LACTIC ACID mmol/L  --  1 4   PROCALCITONIN ng/ml <0 05 <0 05           * I Have Reviewed All Lab Data Listed Above  * Additional Pertinent Lab Tests Reviewed: All Labs Within Last 24 Hours Reviewed    Imaging:    Imaging Reports Reviewed Today Include: None  Imaging Personally Reviewed by Myself Includes:  None    Recent Cultures (last 7 days):     Results from last 7 days   Lab Units 04/17/21 2155 04/17/21 2117   BLOOD CULTURE  No Growth at 48 hrs  No Growth at 48 hrs         Last 24 Hours Medication List:   Current Facility-Administered Medications   Medication Dose Route Frequency Provider Last Rate    acetaminophen  650 mg Oral Q6H PRN Sherine Peng, DINH      ascorbic acid  1,000 mg Oral Q12H [address] Sherine Peng, DINH      atorvastatin  40 mg Oral HS Sherine PengDINH      benzonatate  100 mg Oral TID PRN Tiana Gooden MD      cholecalciferol  2,000 Units Oral Daily Sherine PengDINH      cilostazol  50 mg Oral BID Tiana Gooden MD      dexamethasone  6 mg Intravenous Q24H Sherine PengDINH ibarra      docusate sodium  100 mg Oral BID Tiana Gooden MD      enoxaparin  30 mg Subcutaneous Q12H [address] Sherine PengDINH      famotidine  20 mg Oral Daily Sherine Peng, DINH      guaiFENesin  600 mg Oral Q12H [address] Tiana Gooden MD      insulin glargine  8 Units Subcutaneous HS Keisha Street MD      insulin lispro  1-6 Units Subcutaneous HS Cinthya PÉREZ PA-C      insulin lispro  2-12 Units Subcutaneous TID AC Cinthya PÉREZ PA-C      insulin lispro  3 Units Subcutaneous TID With Meals Keisha Street MD      zinc sulfate  220 mg Oral Daily Brook Marquez PA-C      Followed by   Josh Marcus ON 4/25/2021] multivitamin-minerals  1 tablet Oral Daily Brook Marquez PA-C      ondansetron  4 mg Intravenous Q6H PRN Sherine Khoury PA-C      polyethylene glycol  17 g Oral Daily Tiana Gooden MD      remdesivir  100 mg Intravenous Q24H Sofie Case PA-C      senna  2 tablet Oral HS Haile Ramirez MD          Today, Patient Was Seen By: Jacek Galicia PA-C    ** Please Note: Dictation voice to text software may have been used in the creation of this document   **

## 2021-04-20 NOTE — NURSING NOTE
Assisted patient into a modified proneing position, laying on her right side  Tolerated well  Decreased O2 to 8 L midflow  At 97%

## 2021-04-20 NOTE — PLAN OF CARE
Problem: Potential for Falls  Goal: Patient will remain free of falls  Description: INTERVENTIONS:  - Assess patient frequently for physical needs  -  Identify cognitive and physical deficits and behaviors that affect risk of falls    -  [place] fall precautions as indicated by assessment   - Educate patient/family on patient safety including physical limitations  - Instruct patient to call for assistance with activity based on assessment  - Modify environment to reduce risk of injury  - Consider OT/PT consult to assist with strengthening/mobility  Outcome: Progressing

## 2021-04-20 NOTE — PROGRESS NOTES
Consult Note - Pulmonary and Critical Care Medicine  [place] 79 y o  female MRN: [id number]  Unit/Bed#: [facility] Encounter: [id number]     Consult requested location: Unit/Bed#: [facility]  Physician Requesting Consult: Hernán Ramos MD   Reason for Consult:  Acute hypoxic respiratory      HPI:    26-year-old female with past medical history of type 2 diabetes presents for acute hypoxic respiratory failure fever  Patient states that symptoms started 1 week ago with cough and shortness of breath  This admission, patient states that shortness of breath is improved slightly  Still has a cough that is typically dry in nature but occasionally with mucus that she swallows  ROS:  Twelve point review of systems was negative except for what is otherwise mentioned        Past Medical Hx  Type 2 diabetes      Past Surgical Hx  No pertinent thoracic surgery      Family Hx  Possible paternal hypertension      Occupational History:     No known previous inhalation injuries       Medications    Current Facility-Administered Medications:     acetaminophen (TYLENOL) tablet 650 mg, 650 mg, Oral, Q6H PRN, Kelsey Cohen PA-C    ascorbic acid (VITAMIN C) tablet 1,000 mg, 1,000 mg, Oral, Q12H ZAIDA, Brook Marquez PA-C, 1,000 mg at 04/20/21 0923    atorvastatin (LIPITOR) tablet 40 mg, 40 mg, Oral, HS, Brook Marquez PA-C, 40 mg at 04/19/21 2222    benzonatate (TESSALON PERLES) capsule 100 mg, 100 mg, Oral, TID PRN, Chai Bravo MD, 100 mg at 04/19/21 0909    cholecalciferol (VITAMIN D3) tablet 2,000 Units, 2,000 Units, Oral, Daily, Brook Marquez PA-C, 2,000 Units at 04/20/21 0923    cilostazol (PLETAL) tablet 50 mg, 50 mg, Oral, BID, Tommie Bella MD, 50 mg at 04/20/21 0924    dexamethasone (DECADRON) injection 6 mg, 6 mg, Intravenous, Q24H, Brook Marquez PA-C, 6 mg at 04/19/21 2024    docusate sodium (COLACE) capsule 100 mg, 100 mg, Oral, BID, Chai Bravo MD, 100 mg at 04/18/21 0841    enoxaparin (LOVENOX) subcutaneous injection 30 mg, 30 mg, Subcutaneous, Q12H [address], Brook Marquez PA-C, 30 mg at 04/20/21 0924    famotidine (PEPCID) tablet 20 mg, 20 mg, Oral, Daily, Brook Marquez PA-C, 20 mg at 04/20/21 0924    guaiFENesin (MUCINEX) 12 hr tablet 600 mg, 600 mg, Oral, Q12H [address], Joanna Veliz MD, 600 mg at 04/20/21 0925    insulin glargine (LANTUS) subcutaneous injection 8 Units 0 08 mL, 8 Units, Subcutaneous, HS, Karl Rodrigez MD, 8 Units at 04/19/21 2218    insulin lispro (HumaLOG) 100 units/mL subcutaneous injection 1-6 Units, 1-6 Units, Subcutaneous, HS, Cinthya PÉREZ PA-C, 2 Units at 04/19/21 2217    insulin lispro (HumaLOG) 100 units/mL subcutaneous injection 2-12 Units, 2-12 Units, Subcutaneous, TID AC, 4 Units at 04/20/21 0930 **AND** Fingerstick Glucose (POCT), , , TID AC, Cinthya PÉREZ PA-C    insulin lispro (HumaLOG) 100 units/mL subcutaneous injection 3 Units, 3 Units, Subcutaneous, TID With Meals, Karl Rodrigez MD, 3 Units at 04/20/21 1703    zinc sulfate (ZINCATE) capsule 220 mg, 220 mg, Oral, Daily, 220 mg at 04/20/21 0925 **FOLLOWED BY** [START ON 4/25/2021] multivitamin-minerals (CENTRUM ADULTS) tablet 1 tablet, 1 tablet, Oral, Daily, Brook Marquez PA-C    ondansetron (ZOFRAN) injection 4 mg, 4 mg, Intravenous, Q6H PRN, Bart Davidson PA-C    polyethylene glycol (MIRALAX) packet 17 g, 17 g, Oral, Daily, Joanna Veliz MD    [COMPLETED] remdesivir (Veklury) 200 mg in sodium chloride 0 9 % 250 mL IVPB, 200 mg, Intravenous, Q24H, Stopped at 04/18/21 1922 **FOLLOWED BY** remdesivir (Veklury) 100 mg in sodium chloride 0 9 % 250 mL IVPB, 100 mg, Intravenous, Q24H, Brook Marquez PA-C, 100 mg at 04/20/21 4115    senna (SENOKOT) tablet 17 2 mg, 2 tablet, Oral, HS, Joanna Veliz MD, 17 2 mg at 04/19/21 6861       Social History: Former smoker      Vitals: Blood pressure (!) 171/82, pulse 94, temperature 97 6 °F (36 4 °C), resp   rate 14, height 5' 2" (1 575 m), weight 77 2 kg (170 lb 3 1 oz), SpO2 90 %  , Body mass index is 31 13 kg/m²  Physical Exam:  GEN  NAD  HEENT  ncat, non icteric, MM moist  NECK  supple, no JVD, no LAD  CV  +s1s2, no mrg, RRR  PULM bibasilar rales  ABD  soft, nt, obese, + BS  EXT  trace lower extremity pitting edema, no cyanosis, no clubbing  NEURO  Aox3, no focal weakness      Labs: I personally viewed and interpreted but not limited to the following laboratory studies:      Imaging:  I personally viewed and interpreted the following imaging studies:  Chest x-ray 04/17/2021 shows decreased lung volumes and increased interstitial pattern markings diffusely      Assessment:  1  Acute hypoxic respiratory failure  2  COVID-19 pneumonia      Plan:   Continue titrate supplemental oxygen for goal SpO2 of greater than 92%   COVID19 moderate pathway   I recommend repeating CRP, D-dimer, proBNP, procalcitonin on 04/21/2021   Recommend proning of possible   I recommend diuresis for goal net -1 L in the next 24 hours   I recommend rechecking chest x-ray tomorrow morning   Pulmonary medicine to continue to follow   Plan discussed with primary team      [facility]

## 2021-04-21 ENCOUNTER — APPOINTMENT (INPATIENT)
Dept: RADIOLOGY | Facility: HOSPITAL | Age: 81
DRG: 177 | End: 2021-04-21
Payer: MEDICARE

## 2021-04-21 PROBLEM — I10 HYPERTENSION: Status: ACTIVE | Noted: 2021-04-21

## 2021-04-21 LAB
ALBUMIN SERPL BCP-MCNC: 2.2 G/DL (ref 3.5–5)
ALP SERPL-CCNC: 54 U/L (ref 46–116)
ALT SERPL W P-5'-P-CCNC: 30 U/L (ref 12–78)
ANION GAP SERPL CALCULATED.3IONS-SCNC: 11 MMOL/L (ref 4–13)
AST SERPL W P-5'-P-CCNC: 24 U/L (ref 5–45)
BILIRUB SERPL-MCNC: 0.4 MG/DL (ref 0.2–1)
BUN SERPL-MCNC: 23 MG/DL (ref 5–25)
CALCIUM ALBUM COR SERPL-MCNC: 9.6 MG/DL (ref 8.3–10.1)
CALCIUM SERPL-MCNC: 8.2 MG/DL (ref 8.3–10.1)
CHLORIDE SERPL-SCNC: 105 MMOL/L (ref 100–108)
CO2 SERPL-SCNC: 22 MMOL/L (ref 21–32)
CREAT SERPL-MCNC: 0.78 MG/DL (ref 0.6–1.3)
CRP SERPL QL: 34.7 MG/L
D DIMER PPP FEU-MCNC: 0.5 UG/ML FEU
GFR SERPL CREATININE-BSD FRML MDRD: 71 ML/MIN/1.73SQ M
GLUCOSE SERPL-MCNC: 167 MG/DL (ref 65–140)
GLUCOSE SERPL-MCNC: 177 MG/DL (ref 65–140)
GLUCOSE SERPL-MCNC: 209 MG/DL (ref 65–140)
GLUCOSE SERPL-MCNC: 220 MG/DL (ref 65–140)
GLUCOSE SERPL-MCNC: 82 MG/DL (ref 65–140)
NT-PROBNP SERPL-MCNC: 813 PG/ML
POTASSIUM SERPL-SCNC: 4.1 MMOL/L (ref 3.5–5.3)
PROCALCITONIN SERPL-MCNC: <0.05 NG/ML
PROT SERPL-MCNC: 5.6 G/DL (ref 6.4–8.2)
SODIUM SERPL-SCNC: 138 MMOL/L (ref 136–145)

## 2021-04-21 PROCEDURE — 99232 SBSQ HOSP IP/OBS MODERATE 35: CPT | Performed by: INTERNAL MEDICINE

## 2021-04-21 PROCEDURE — 80053 COMPREHEN METABOLIC PANEL: CPT | Performed by: INTERNAL MEDICINE

## 2021-04-21 PROCEDURE — 82948 REAGENT STRIP/BLOOD GLUCOSE: CPT

## 2021-04-21 PROCEDURE — 83880 ASSAY OF NATRIURETIC PEPTIDE: CPT | Performed by: PHYSICIAN ASSISTANT

## 2021-04-21 PROCEDURE — 85379 FIBRIN DEGRADATION QUANT: CPT | Performed by: PHYSICIAN ASSISTANT

## 2021-04-21 PROCEDURE — 84145 PROCALCITONIN (PCT): CPT | Performed by: PHYSICIAN ASSISTANT

## 2021-04-21 PROCEDURE — 71045 X-RAY EXAM CHEST 1 VIEW: CPT

## 2021-04-21 PROCEDURE — 94760 N-INVAS EAR/PLS OXIMETRY 1: CPT

## 2021-04-21 PROCEDURE — 86140 C-REACTIVE PROTEIN: CPT | Performed by: PHYSICIAN ASSISTANT

## 2021-04-21 RX ORDER — FUROSEMIDE 10 MG/ML
40 INJECTION INTRAMUSCULAR; INTRAVENOUS ONCE
Status: COMPLETED | OUTPATIENT
Start: 2021-04-21 | End: 2021-04-21

## 2021-04-21 RX ORDER — LISINOPRIL 10 MG/1
10 TABLET ORAL DAILY
Status: DISCONTINUED | OUTPATIENT
Start: 2021-04-21 | End: 2021-04-27 | Stop reason: HOSPADM

## 2021-04-21 RX ORDER — METOPROLOL TARTRATE 5 MG/5ML
5 INJECTION INTRAVENOUS EVERY 6 HOURS PRN
Status: DISCONTINUED | OUTPATIENT
Start: 2021-04-21 | End: 2021-04-21

## 2021-04-21 RX ADMIN — ATORVASTATIN CALCIUM 40 MG: 40 TABLET, FILM COATED ORAL at 21:29

## 2021-04-21 RX ADMIN — INSULIN LISPRO 2 UNITS: 100 INJECTION, SOLUTION INTRAVENOUS; SUBCUTANEOUS at 11:57

## 2021-04-21 RX ADMIN — CILOSTAZOL 50 MG: 50 TABLET ORAL at 18:05

## 2021-04-21 RX ADMIN — METOPROLOL TARTRATE 25 MG: 25 TABLET, FILM COATED ORAL at 12:00

## 2021-04-21 RX ADMIN — METOPROLOL TARTRATE 25 MG: 25 TABLET, FILM COATED ORAL at 20:54

## 2021-04-21 RX ADMIN — OXYCODONE HYDROCHLORIDE AND ACETAMINOPHEN 1000 MG: 500 TABLET ORAL at 20:54

## 2021-04-21 RX ADMIN — CILOSTAZOL 50 MG: 50 TABLET ORAL at 08:57

## 2021-04-21 RX ADMIN — FAMOTIDINE 20 MG: 20 TABLET ORAL at 08:57

## 2021-04-21 RX ADMIN — DEXAMETHASONE SODIUM PHOSPHATE 6 MG: 4 INJECTION, SOLUTION INTRA-ARTICULAR; INTRALESIONAL; INTRAMUSCULAR; INTRAVENOUS; SOFT TISSUE at 20:05

## 2021-04-21 RX ADMIN — FUROSEMIDE 40 MG: 10 INJECTION, SOLUTION INTRAMUSCULAR; INTRAVENOUS at 18:05

## 2021-04-21 RX ADMIN — ZINC SULFATE 220 MG (50 MG) CAPSULE 220 MG: CAPSULE at 08:57

## 2021-04-21 RX ADMIN — INSULIN GLARGINE 10 UNITS: 100 INJECTION, SOLUTION SUBCUTANEOUS at 21:29

## 2021-04-21 RX ADMIN — INSULIN LISPRO 4 UNITS: 100 INJECTION, SOLUTION INTRAVENOUS; SUBCUTANEOUS at 08:57

## 2021-04-21 RX ADMIN — ENOXAPARIN SODIUM 30 MG: 30 INJECTION SUBCUTANEOUS at 20:54

## 2021-04-21 RX ADMIN — LISINOPRIL 10 MG: 10 TABLET ORAL at 10:11

## 2021-04-21 RX ADMIN — REMDESIVIR 100 MG: 100 INJECTION, POWDER, LYOPHILIZED, FOR SOLUTION INTRAVENOUS at 08:57

## 2021-04-21 RX ADMIN — INSULIN LISPRO 1 UNITS: 100 INJECTION, SOLUTION INTRAVENOUS; SUBCUTANEOUS at 21:29

## 2021-04-21 RX ADMIN — GUAIFENESIN 600 MG: 600 TABLET ORAL at 08:57

## 2021-04-21 RX ADMIN — GUAIFENESIN 600 MG: 600 TABLET ORAL at 20:54

## 2021-04-21 RX ADMIN — ENOXAPARIN SODIUM 30 MG: 30 INJECTION SUBCUTANEOUS at 08:56

## 2021-04-21 RX ADMIN — Medication 2000 UNITS: at 08:57

## 2021-04-21 RX ADMIN — OXYCODONE HYDROCHLORIDE AND ACETAMINOPHEN 1000 MG: 500 TABLET ORAL at 08:57

## 2021-04-21 NOTE — ASSESSMENT & PLAN NOTE
· Came in due to generalized fatigue over the past week  Patient had a COVID test outpatient but has been waiting for the results    · COVID positive in the ED  · 84% on room air, initially placed on 2 L, stable on 2 L now pulmonary team is following  ·   Ordered Actemra 4/19  · Cardiac markers   · Troponin negative  · CK normal 41  · BNP 1805  · Inflammatory markers   · CRP:  157 5  · D-dimer elevated at 1 69  · Ferritin 308  · In the ED patient received ceftriaxone, doxycycline and Decadron  · Continue vitamin cocktail, Pepcid, remdesivir  · Continue patient on atorvastatin 40 mg daily  · CXR 4/21 reveals slight progression of pneumonia  · Will repeat procalcitonin per Pulmonary recommendations  · Pulmonary also recommending diuresis, 1L again today will again give Lasix IV and monitor intake and output as well as renal function  · Did achieve -725ml yesterday

## 2021-04-21 NOTE — CONSULTS
Please ignore progress note dated 4/20/21  Triston Goode MD   Physician   Pulmonology   Progress Notes       Signed   Date of Service:  4/20/2021 12:18 PM               Signed             Show:Clear all  [x]Manual[x]Template[]Copied    Added by:  [x]Alessandro Maya MD    []Cristofer for details  Consult Note - Pulmonary and [address] 79 y o  female MRN: [id number]  Unit/Bed#: [facility]- Encounter: [id number]      Consult requested location: Unit/Bed#: [place] [address]  Physician Requesting Consult: Scooby Florentino MD   Reason for Consult:  Acute hypoxic respiratory        HPI:    79-year-old female with past medical history of type 2 diabetes presents for acute hypoxic respiratory failure fever  Patient states that symptoms started 1 week ago with cough and shortness of breath  This admission, patient states that shortness of breath is improved slightly    Still has a cough that is typically dry in nature but occasionally with mucus that she swallows      ROS:  Twelve point review of systems was negative except for what is otherwise mentioned         Past Medical Hx  Type 2 diabetes        Past Surgical Hx  No pertinent thoracic surgery        Family Hx  Possible paternal hypertension        Occupational History:      No known previous inhalation injuries        Medications     Current Facility-Administered Medications:     acetaminophen (TYLENOL) tablet 650 mg, 650 mg, Oral, Q6H PRN, Rafael Rudolph PA-C    ascorbic acid (VITAMIN C) tablet 1,000 mg, 1,000 mg, Oral, Q12H [facility], Brook Marquez PA-C, 1,000 mg at 04/20/21 0923    atorvastatin (LIPITOR) tablet 40 mg, 40 mg, Oral, HS, Brook Marquez PA-C, 40 mg at 04/19/21 2222    benzonatate (TESSALON PERLES) capsule 100 mg, 100 mg, Oral, TID PRN, Haley Garay MD, 100 mg at 04/19/21 0909    cholecalciferol (VITAMIN D3) tablet 2,000 Units, 2,000 Units, Oral, Daily, Brook Marquez PA-C, 2,000 Units at 04/20/21 0923    cilostazol (PLETAL) tablet 50 mg, 50 mg, Oral, BID, Husam Vicente MD, 50 mg at 04/20/21 0924    dexamethasone (DECADRON) injection 6 mg, 6 mg, Intravenous, Q24H, Brook Marquez PA-C, 6 mg at 04/19/21 2024    docusate sodium (COLACE) capsule 100 mg, 100 mg, Oral, BID, Husam Vicente MD, 100 mg at 04/18/21 0841    enoxaparin (LOVENOX) subcutaneous injection 30 mg, 30 mg, Subcutaneous, Q12H [facility], Brook Marquez PA-C, 30 mg at 04/20/21 0924    famotidine (PEPCID) tablet 20 mg, 20 mg, Oral, Daily, Brook Marquez PA-C, 20 mg at 04/20/21 0924    guaiFENesin (MUCINEX) 12 hr tablet 600 mg, 600 mg, Oral, Q12H [facility], Husam Vicente MD, 600 mg at 04/20/21 0925    insulin glargine (LANTUS) subcutaneous injection 8 Units 0 08 mL, 8 Units, Subcutaneous, HS, Emily Kong MD, 8 Units at 04/19/21 2218    insulin lispro (HumaLOG) 100 units/mL subcutaneous injection 1-6 Units, 1-6 Units, Subcutaneous, HS, Cinthya PÉREZ PA-C, 2 Units at 04/19/21 2217    insulin lispro (HumaLOG) 100 units/mL subcutaneous injection 2-12 Units, 2-12 Units, Subcutaneous, TID AC, 4 Units at 04/20/21 0930 **AND** Fingerstick Glucose (POCT), , , TID AC, Cinthya PÉREZ PA-C    insulin lispro (HumaLOG) 100 units/mL subcutaneous injection 3 Units, 3 Units, Subcutaneous, TID With Meals, Emily Kong MD, 3 Units at 04/20/21 4190    zinc sulfate (ZINCATE) capsule 220 mg, 220 mg, Oral, Daily, 220 mg at 04/20/21 0925 **FOLLOWED BY** [START ON 4/25/2021] multivitamin-minerals (CENTRUM ADULTS) tablet 1 tablet, 1 tablet, Oral, Daily, Brook Marquez PA-C    ondansetron (ZOFRAN) injection 4 mg, 4 mg, Intravenous, Q6H PRN, Romana Marin PA-C    polyethylene glycol (MIRALAX) packet 17 g, 17 g, Oral, Daily, Husam Vicente MD    [COMPLETED] remdesivir (Veklury) 200 mg in sodium chloride 0 9 % 250 mL IVPB, 200 mg, Intravenous, Q24H, Stopped at 04/18/21 1922 **FOLLOWED BY** remdesivir (Veklury) 100 mg in sodium chloride 0 9 % 250 mL IVPB, 100 mg, Intravenous, Q24H, Cliffton Ped, PA-C, 100 mg at 04/20/21 4043    senna (SENOKOT) tablet 17 2 mg, 2 tablet, Oral, HS, Chai Bravo MD, 17 2 mg at 04/19/21 2223         Social History: Former smoker        Vitals: Blood pressure (!) 171/82, pulse 94, temperature 97 6 °F (36 4 °C), resp  rate 14, height 5' 2" (1 575 m), weight 77 2 kg (170 lb 3 1 oz), SpO2 90 %  , Body mass index is 31 13 kg/m²      Physical Exam:  GEN  NAD  HEENT  ncat, non icteric, MM moist  NECK  supple, no JVD, no LAD  CV  +s1s2, no mrg, RRR  PULM bibasilar rales  ABD  soft, nt, obese, + BS  EXT  trace lower extremity pitting edema, no cyanosis, no clubbing  NEURO  Aox3, no focal weakness        Labs: I personally viewed and interpreted but not limited to the following laboratory studies:        Imaging:  I personally viewed and interpreted the following imaging studies:  Chest x-ray 04/17/2021 shows decreased lung volumes and increased interstitial pattern markings diffusely        Assessment:  1  Acute hypoxic respiratory failure  2  COVID-19 pneumonia        Plan:  · Continue titrate supplemental oxygen for goal SpO2 of greater than 92%  · COVID19 moderate pathway  · I recommend repeating CRP, D-dimer, proBNP, procalcitonin on 04/21/2021  · Recommend proning of possible  · I recommend diuresis for goal net -1 L in the next 24 hours  · I recommend rechecking chest x-ray tomorrow morning  · Pulmonary medicine to continue to follow  · Plan discussed with primary team        KIP Lundberg    [facility]

## 2021-04-21 NOTE — PLAN OF CARE
Problem: Potential for Falls  Goal: Patient will remain free of falls  Description: INTERVENTIONS:  - Assess patient frequently for physical needs  -  Identify cognitive and physical deficits and behaviors that affect risk of falls    -  [place] fall precautions as indicated by assessment   - Educate patient/family on patient safety including physical limitations  - Instruct patient to call for assistance with activity based on assessment  - Modify environment to reduce risk of injury  - Consider OT/PT consult to assist with strengthening/mobility  Outcome: Progressing

## 2021-04-21 NOTE — PROGRESS NOTES
Progress Note - Pulmonary   Serge Sanchez 79 y o  female MRN: [id number]  Unit/Bed#: -[facility] Encounter: [id number]      Interval History:   Patient states that she is slightly more lethargic today than yesterday  Still complains of cough with occasional clear sputum  No fever or chills  Review of Systems:  Full 12 point review of systems negative other than previously mentioned    Objective:   Vitals: Blood pressure (!) 180/98, pulse 99, temperature 98 2 °F (36 8 °C), resp  rate 19, height 5' 2" (1 575 m), weight 77 2 kg (170 lb 3 1 oz), SpO2 90 %  , Body mass index is 31 13 kg/m²  Physical Exam  No acute distress  S1-S2  Diminished breath sounds diffusely  Soft nontender obese  1+ lower extremity pitting edema  Awake alert    Labs: I have personally reviewed pertinent lab results  Results Reviewed     Procedure Component Value Units Date/Time    Blood culture [[id number]] Collected: 04/17/21 2117    Lab Status: Preliminary result Specimen: Blood from Arm, Left Updated: 04/21/21 0802     Blood Culture No Growth at 72 hrs  Blood culture [[id number]] Collected: 04/17/21 2155    Lab Status: Preliminary result Specimen: Blood from Hand, Right Updated: 04/21/21 0802     Blood Culture No Growth at 72 hrs      Procalcitonin [[id number]]  (Normal) Collected: 04/17/21 2117    Lab Status: Final result Specimen: Blood from Arm, Left Updated: 04/18/21 0531     Procalcitonin <0 05 ng/ml     Hemoglobin A1C [[id number]]  (Abnormal) Collected: 04/17/21 2117    Lab Status: Final result Specimen: Blood from Arm, Left Updated: 04/18/21 0528     Hemoglobin A1C 8 8 %       mg/dl     Ferritin [[id number]]  (Normal) Collected: 04/17/21 2117    Lab Status: Final result Specimen: Blood from Arm, Left Updated: 04/18/21 0500     Ferritin 337 ng/mL     Calcium, ionized [[id number]]  (Abnormal) Collected: 04/17/21 2117    Lab Status: Final result Specimen: Blood from Arm, Left Updated: 04/18/21 0429     Calcium, Ionized 1 07 mmol/L COVID[facility], [facility]/B, [facility] [[id number]]  (Abnormal) Collected: 04/17/21 2111    Lab Status: Final result Specimen: Nares from Nasopharyngeal Swab Updated: 04/17/21 2208     SARS-CoV-2 Positive     INFLUENZA A PCR Negative     INFLUENZA B PCR Negative     RSV PCR Negative    Narrative: This test has been authorized by FDA under an EUA (Emergency Use Assay) for use by authorized laboratories  Clinical caution and judgement should be used with the interpretation of these results with consideration of the clinical impression and other laboratory testing  Testing reported as "Positive" or "Negative" has been proven to be accurate according to standard laboratory validation requirements  All testing is performed with control materials showing appropriate reactivity at standard intervals      C-reactive protein [[id number]]  (Abnormal) Collected: 04/17/21 2117    Lab Status: Final result Specimen: Blood from Arm, Left Updated: 04/17/21 2200      5 mg/L     Comprehensive metabolic panel [[id number]]  (Abnormal) Collected: 04/17/21 2117    Lab Status: Final result Specimen: Blood from Arm, Left Updated: 04/17/21 2200     Sodium 129 mmol/L      Potassium 3 8 mmol/L      Chloride 93 mmol/L      CO2 23 mmol/L      ANION GAP 13 mmol/L      BUN 14 mg/dL      Creatinine 0 85 mg/dL      Glucose 155 mg/dL      Calcium 8 1 mg/dL      Corrected Calcium 9 4 mg/dL      AST 33 U/L      ALT 41 U/L      Alkaline Phosphatase 58 U/L      Total Protein 6 4 g/dL      Albumin 2 4 g/dL      Total Bilirubin 0 60 mg/dL      eGFR 64 ml/min/1 73sq m     Narrative:      [facility] guidelines for Chronic Kidney Disease (CKD):     Stage 1 with normal or high GFR (GFR > 90 mL/min/1 73 square meters)    Stage 2 Mild CKD (GFR = 60-89 mL/min/1 73 square meters)    Stage 3A Moderate CKD (GFR = 45-59 mL/min/1 73 square meters)    Stage 3B Moderate CKD (GFR = 30-44 mL/min/1 73 square meters)    Stage 4 Severe CKD (GFR = 15-29 mL/min/1 73 square meters)    Stage 5 End Stage CKD (GFR <15 mL/min/1 73 square meters)  Note: GFR calculation is accurate only with a steady state creatinine    Magnesium [[id number]]  (Normal) Collected: 04/17/21 2117    Lab Status: Final result Specimen: Blood from Arm, Left Updated: 04/17/21 2200     Magnesium 2 0 mg/dL     NT-BNP PRO [[id number]]  (Abnormal) Collected: 04/17/21 2117    Lab Status: Final result Specimen: Blood from Arm, Left Updated: 04/17/21 2200     NT-proBNP 1,805 pg/mL     CK [[id number]]  (Normal) Collected: 04/17/21 2117    Lab Status: Final result Specimen: Blood from Arm, Left Updated: 04/17/21 2157     Total CK 41 U/L     Lactic acid, plasma [[id number]]  (Normal) Collected: 04/17/21 2117    Lab Status: Final result Specimen: Blood from Arm, Left Updated: 04/17/21 2152     LACTIC ACID 1 4 mmol/L     Narrative:      Result may be elevated if tourniquet was used during collection      Troponin I [[id number]]  (Normal) Collected: 04/17/21 2117    Lab Status: Final result Specimen: Blood from Arm, Left Updated: 04/17/21 2151     Troponin I <0 02 ng/mL     D-dimer, quantitative [[id number]]  (Abnormal) Collected: 04/17/21 2117    Lab Status: Final result Specimen: Blood from Arm, Left Updated: 04/17/21 2145     D-Dimer, Quant 1 69 ug/ml FEU     Protime-INR [[id number]]  (Normal) Collected: 04/17/21 2117    Lab Status: Final result Specimen: Blood from Arm, Left Updated: 04/17/21 2142     Protime 13 1 seconds      INR 0 99    APTT [[id number]]  (Normal) Collected: 04/17/21 2117    Lab Status: Final result Specimen: Blood from Arm, Left Updated: 04/17/21 2142     PTT 35 seconds     CBC and differential [[id number]]  (Abnormal) Collected: 04/17/21 2117    Lab Status: Final result Specimen: Blood from Arm, Left Updated: 04/17/21 2129     WBC 5 35 Thousand/uL      RBC 4 48 Million/uL      Hemoglobin 13 4 g/dL      Hematocrit 39 0 %      MCV 87 fL      MCH 29 9 pg      MCHC 34 4 g/dL      RDW 12 1 %      MPV 9 2 fL      Platelets 089 Thousands/uL      nRBC 0 /100 WBCs      Neutrophils Relative 80 %      Immat GRANS % 0 %      Lymphocytes Relative 15 %      Monocytes Relative 5 %      Eosinophils Relative 0 %      Basophils Relative 0 %      Neutrophils Absolute 4 25 Thousands/µL      Immature Grans Absolute 0 02 Thousand/uL      Lymphocytes Absolute 0 79 Thousands/µL      Monocytes Absolute 0 28 Thousand/µL      Eosinophils Absolute 0 00 Thousand/µL      Basophils Absolute 0 01 Thousands/µL            Current Medications:    Current Facility-Administered Medications:     acetaminophen (TYLENOL) tablet 650 mg, 650 mg, Oral, Q6H PRN, Yehuda Mason PA-C    ascorbic acid (VITAMIN C) tablet 1,000 mg, 1,000 mg, Oral, Q12H ZAIDA, Brook Marquez PA-C, 1,000 mg at 04/21/21 0857    atorvastatin (LIPITOR) tablet 40 mg, 40 mg, Oral, HS, Brook Marquez PA-C, 40 mg at 04/20/21 2238    benzonatate (TESSALON PERLES) capsule 100 mg, 100 mg, Oral, TID PRN, Aron Aase, MD, 100 mg at 04/19/21 0909    cholecalciferol (VITAMIN D3) tablet 2,000 Units, 2,000 Units, Oral, Daily, Brook Marquez PA-C, 2,000 Units at 04/21/21 0857    cilostazol (PLETAL) tablet 50 mg, 50 mg, Oral, BID, Tommie Bella MD, 50 mg at 04/21/21 0857    dexamethasone (DECADRON) injection 6 mg, 6 mg, Intravenous, Q24H, Brook Marquez PA-C, 6 mg at 04/20/21 2240    enoxaparin (LOVENOX) subcutaneous injection 30 mg, 30 mg, Subcutaneous, Q12H [address], Brook Marquez PA-C, 30 mg at 04/21/21 0856    famotidine (PEPCID) tablet 20 mg, 20 mg, Oral, Daily, Brook Marquez PA-C, 20 mg at 04/21/21 0857    guaiFENesin (MUCINEX) 12 hr tablet 600 mg, 600 mg, Oral, Q12H [address], Tommie Bella MD, 600 mg at 04/21/21 0857    insulin glargine (LANTUS) subcutaneous injection 10 Units 0 1 mL, 10 Units, Subcutaneous, HS, Itzel Godfrey MD, 10 Units at 04/20/21 2245    insulin lispro (HumaLOG) 100 units/mL subcutaneous injection 1-6 Units, 1-6 Units, Subcutaneous, HS, Cinthya PÉREZ PA-C, 1 Units at 04/20/21 2246    insulin lispro (HumaLOG) 100 units/mL subcutaneous injection 2-12 Units, 2-12 Units, Subcutaneous, TID AC, 2 Units at 04/21/21 1157 **AND** Fingerstick Glucose (POCT), , , TID AC, Cinthya PÉREZ PA-C    insulin lispro (HumaLOG) 100 units/mL subcutaneous injection 5 Units, 5 Units, Subcutaneous, TID With Meals, Tess Waterman MD, 5 Units at 04/21/21 1157    lisinopril (ZESTRIL) tablet 10 mg, 10 mg, Oral, Daily, Cinthya PÉREZ PA-C, 10 mg at 04/21/21 1011    metoprolol tartrate (LOPRESSOR) tablet 25 mg, 25 mg, Oral, Q12H [address], Tess Waterman MD, 25 mg at 04/21/21 1200    zinc sulfate (ZINCATE) capsule 220 mg, 220 mg, Oral, Daily, 220 mg at 04/21/21 0857 **FOLLOWED BY** [START ON 4/25/2021] multivitamin-minerals (CENTRUM ADULTS) tablet 1 tablet, 1 tablet, Oral, Daily, Brook Marquez PA-C    ondansetron (ZOFRAN) injection 4 mg, 4 mg, Intravenous, Q6H PRN, Jinny Lindsay PA-C    [COMPLETED] remdesivir (Veklury) 200 mg in sodium chloride 0 9 % 250 mL IVPB, 200 mg, Intravenous, Q24H, Stopped at 04/18/21 1922 **FOLLOWED BY** remdesivir (Veklury) 100 mg in sodium chloride 0 9 % 250 mL IVPB, 100 mg, Intravenous, Q24H, Brook Marquez PA-C, 100 mg at 04/21/21 0857         Assessment:  1  Acute hypoxic respiratory failure  2  COVID-19 pneumonia    Plan:   Continue titrate supplemental oxygen for goal SpO2 greater than 92%   Continue COVID-19 moderate pathway   Consider rechecking procalcitonin   Patient titrated to 2 L of supplemental oxygen with SpO2 of 94% while on room   Again I recommend diuresis for goal of net -1 L in the next 24 hours   Okay to increase oxygen with ambulation with exertion   I personally discussed this patient in depth with the primary service      [facility]

## 2021-04-21 NOTE — ASSESSMENT & PLAN NOTE
· Pressures have been elevated during admission  · Will start on 10mg lisinopril and 25mg metoprolol BID

## 2021-04-21 NOTE — PROGRESS NOTES
[place]  Progress Note - Christoph Manzano 1940, 79 y o  female MRN: [id number]  Unit/Bed#: -Ruben Encounter: [id number]  Primary Care Provider: Tavo Brooke MD   Date and time admitted to hospital: 4/17/2021  8:22 PM    * Pneumonia due to COVID-19 virus  Assessment & Plan  · Came in due to generalized fatigue over the past week  Patient had a COVID test outpatient but has been waiting for the results  · COVID positive in the ED  · 84% on room air, initially placed on 2 L, stable on 2 L now pulmonary team is following  ·   Ordered Actemra 4/19  · Cardiac markers   · Troponin negative  · CK normal 41  · BNP 1805  · Inflammatory markers   · CRP:  157 5  · D-dimer elevated at 1 69  · Ferritin 308  · In the ED patient received ceftriaxone, doxycycline and Decadron  · Continue vitamin cocktail, Pepcid, remdesivir  · Continue patient on atorvastatin 40 mg daily  · CXR 4/21 reveals slight progression of pneumonia  · Will repeat procalcitonin per Pulmonary recommendations  · Pulmonary also recommending diuresis, 1L again today will again give Lasix IV and monitor intake and output as well as renal function  · Did achieve -725ml yesterday    Hypertension  Assessment & Plan  · Pressures have been elevated during admission  · Will start on 10mg lisinopril and 25mg metoprolol BID    Acute respiratory failure with hypoxia (HCC)  Assessment & Plan  · In the setting of COVID-19  · Continue supplemental oxygen     Type 2 diabetes mellitus, without long-term current use of insulin [facility])  Assessment & Plan  Lab Results   Component Value Date    HGBA1C 8 8 (H) 04/17/2021       Recent Labs     04/20/21  1718 04/20/21  2054 04/21/21  0800 04/21/21  1153   POCGLU 184* 185* 220* 167*       Blood Sugar Average: Last 72 hrs:  (P) 209 [id number]  · Patient reports she takes oral diabetes medications at home, She is not sure which ones or doses   Denies current use of insulin or other injection medications  · Continue SSI,  Increasde regimen  · Added 10u Lantus qhs and 5u with meals  · Hemoglobin A1c suggest patient may need adjustment outpatient regimen  Recommend follow-up with outpatient provider after patient has completed course of steroids,  currently experiencing steroid induced hyperglycemia    Hyponatremia  Assessment & Plan  · Likely chronic due to reduced PO intake vs SIADH from COVID pna  · Sodium 129 on admission, received IVF and now improved to 131   · Patient reports that she has been very fatigued and has had decreased p o  intake  · Ordered 500 mL bolus  · Continue to monitor    VTE Pharmacologic Prophylaxis:   Pharmacologic: Enoxaparin (Lovenox)  Mechanical VTE Prophylaxis in Place: Yes    Patient Centered Rounds: I have performed bedside rounds with nursing staff today  Discussions with Specialists or Other Care Team Provider:  Appreciate input from Pulmonary note    Education and Discussions with Family / Patient: Discussed with patient's daughter over the phone  Answered all questions to the best of my ability  Time Spent for Care: 30 minutes  More than 50% of total time spent on counseling and coordination of care as described above  Current Length of Stay: 4 day(s)    Current Patient Status: Inpatient   Certification Statement: The patient will continue to require additional inpatient hospital stay due to IV management of COVID-19, supplemental    Discharge Plan:  Patient comes from home anticipate evaluation by PT/OT once symptomatically improved  May require home with oxygen versus rehab  Code Status: Level 1 - Full Code      Subjective:   Patient feels somewhat more lethargic today  Continues to denies shortness of breath and feels stable on supplemental oxygen      Objective:     Vitals:   Temp (24hrs), Av 7 °F (36 5 °C), Min:97 1 °F (36 2 °C), Max:98 2 °F (36 8 °C)    Temp:  [97 1 °F (36 2 °C)-98 2 °F (36 8 °C)] 98 2 °F (36 8 °C)  HR:  [62-99] 99  Resp: [19] 19  BP: (178-186)/(75-98) 180/98  SpO2:  [75 %-96 %] 90 %  Body mass index is 31 13 kg/m²  Input and Output Summary (last 24 hours): Intake/Output Summary (Last 24 hours) at 4/21/2021 1332  Last data filed at 4/21/2021 1101  Gross per 24 hour   Intake 480 ml   Output 2025 ml   Net -1545 ml       Physical Exam:     Physical Exam  Constitutional:       General: She is not in acute distress  Appearance: Normal appearance  She is well-developed  Interventions: Nasal cannula in place  HENT:      Head: Normocephalic and atraumatic  Eyes:      General: No scleral icterus  Conjunctiva/sclera: Conjunctivae normal    Cardiovascular:      Rate and Rhythm: Normal rate and regular rhythm  Heart sounds: No murmur  Pulmonary:      Effort: Pulmonary effort is normal       Breath sounds: Decreased air movement present  Decreased breath sounds present  No wheezing, rhonchi or rales  Abdominal:      General: There is no distension  Palpations: Abdomen is soft  Skin:     General: Skin is warm and dry  Neurological:      General: No focal deficit present  Mental Status: She is alert     Psychiatric:         Mood and Affect: Mood normal          Additional Data:     Labs:    Results from last 7 days   Lab Units 04/20/21  0542 04/18/21  0611 04/17/21  2117   WBC Thousand/uL 6 03 3 42* 5 35   HEMOGLOBIN g/dL 12 3 12 9 13 4   HEMATOCRIT % 37 1 38 2 39 0   PLATELETS Thousands/uL 416* 307 275   BANDS PCT %  --  5  --    NEUTROS PCT %  --   --  80*   LYMPHS PCT %  --   --  15   LYMPHO PCT %  --  12*  --    MONOS PCT %  --   --  5   MONO PCT %  --  0*  --    EOS PCT %  --  0 0     Results from last 7 days   Lab Units 04/21/21  0637   SODIUM mmol/L 138   POTASSIUM mmol/L 4 1   CHLORIDE mmol/L 105   CO2 mmol/L 22   BUN mg/dL 23   CREATININE mg/dL 0 78   ANION GAP mmol/L 11   CALCIUM mg/dL 8 2*   ALBUMIN g/dL 2 2*   TOTAL BILIRUBIN mg/dL 0 40   ALK PHOS U/L 54   ALT U/L 30   AST U/L 24 GLUCOSE RANDOM mg/dL 209*     Results from last 7 days   Lab Units 04/17/21 2117   INR  0 99     Results from last 7 days   Lab Units 04/21/21  1153 04/21/21  0800 04/20/21  2054 04/20/21  1718 04/20/21  1219 04/19/21  2216 04/19/21  1734 04/19/21  1113 04/19/21  0722 04/18/21  2104 04/18/21  1714 04/18/21  1107   POC GLUCOSE mg/dl 167* 220* 185* 184* 231* 195* 237* 228* 238* 207* 191* 255*     Results from last 7 days   Lab Units 04/17/21 2117   HEMOGLOBIN A1C % 8 8*     Results from last 7 days   Lab Units 04/21/21  0530 04/18/21  0630 04/17/21 2117   LACTIC ACID mmol/L  --   --  1 4   PROCALCITONIN ng/ml <0 05 <0 05 <0 05           * I Have Reviewed All Lab Data Listed Above  * Additional Pertinent Lab Tests Reviewed: All Labs Within Last 24 Hours Reviewed    Imaging:    Imaging Reports Reviewed Today Include:   · CXR 4/21: Patchy multifocal parenchymal airspace opacity is slightly progressed when compared to prior examination consistent with subtle progression of viral pneumonia due to COVID-19 infection  Imaging Personally Reviewed by Myself Includes:  None    Recent Cultures (last 7 days):     Results from last 7 days   Lab Units 04/17/21 2155 04/17/21 2117   BLOOD CULTURE  No Growth at 72 hrs  No Growth at 72 hrs         Last 24 Hours Medication List:   Current Facility-Administered Medications   Medication Dose Route Frequency Provider Last Rate    acetaminophen  650 mg Oral Q6H PRN Madison Dub, PA-C      ascorbic acid  1,000 mg Oral Q12H [address] Madison Dub, PA-C      atorvastatin  40 mg Oral HS Manolo Dub, PA-C      benzonatate  100 mg Oral TID PRN Arielle Gil MD      cholecalciferol  2,000 Units Oral Daily Manolo Dub, PA-C      cilostazol  50 mg Oral BID Arielle Gil MD      dexamethasone  6 mg Intravenous Q24H Manolo Dub, PA-C      enoxaparin  30 mg Subcutaneous Q12H [address] Brook Marquez PA-C      famotidine  20 mg Oral Daily Manolo Dub, PA-C      furosemide  40 mg Intravenous Once [employer], DINH      guaiFENesin  600 mg Oral Q12H [address] Husam Vicente MD      insulin glargine  10 Units Subcutaneous HS Emily Kong MD      insulin lispro  1-6 Units Subcutaneous HS Cinthya PÉREZ PA-C      insulin lispro  2-12 Units Subcutaneous TID AC Cinthya PÉREZ PA-C      insulin lispro  5 Units Subcutaneous TID With Meals Emily Kong MD      lisinopril  10 mg Oral Daily [address] [facility] VDINH      metoprolol tartrate  25 mg Oral Q12H [facility] 12, MD      zinc sulfate  220 mg Oral Daily Brook Marquez PA-C      Followed by   Jake Robledo ON 4/25/2021] multivitamin-minerals  1 tablet Oral Daily Brook Marquez PA-C      ondansetron  4 mg Intravenous Q6H PRN [place] PedDINH      remdesivir  100 mg Intravenous Q24H [place] PedDINH          Today, Patient Was Seen By: Angeles Murry PA-C    ** Please Note: Dictation voice to text software may have been used in the creation of this document   **

## 2021-04-21 NOTE — OCCUPATIONAL THERAPY NOTE
Occupational Therapy Tx Note     Patient Name: Amber STEIN Date: 4/21/2021  Problem List  Principal Problem:    Pneumonia due to COVID-19 virus  Active Problems:    Hyponatremia    Type 2 diabetes mellitus, without long-term current use of insulin (HCC)    Acute respiratory failure with hypoxia ([facility] [address] )    Hypertension          04/21/21 1312   OT Last Visit   OT Visit Date 04/21/21   Note Type   Cancel Reasons Medical status   Assessment   Assessment OT orders received  Chart reviewed  Per discussion with RAKEL Mendes during rounds, pt not "quite ready" for therapy at this time 2/2 rapid desaturation with activity  Will follow as able            Hilda Knight, OTR/L

## 2021-04-21 NOTE — ASSESSMENT & PLAN NOTE
Lab Results   Component Value Date    HGBA1C 8 8 (H) 04/17/2021       Recent Labs     04/20/21  1718 04/20/21  2054 04/21/21  0800 04/21/21  1153   POCGLU 184* 185* 220* 167*       Blood Sugar Average: Last 72 hrs:  (P) 209 [id number]  · Patient reports she takes oral diabetes medications at home, She is not sure which ones or doses  Denies current use of insulin or other injection medications  · Continue SSI,  Increasde regimen  · Added 10u Lantus qhs and 5u with meals  · Hemoglobin A1c suggest patient may need adjustment outpatient regimen     Recommend follow-up with outpatient provider after patient has completed course of steroids,  currently experiencing steroid induced hyperglycemia

## 2021-04-22 ENCOUNTER — APPOINTMENT (INPATIENT)
Dept: RADIOLOGY | Facility: HOSPITAL | Age: 81
DRG: 177 | End: 2021-04-22
Attending: INTERNAL MEDICINE
Payer: MEDICARE

## 2021-04-22 PROBLEM — E87.1 HYPONATREMIA: Status: RESOLVED | Noted: 2021-04-17 | Resolved: 2021-04-22

## 2021-04-22 LAB
ALBUMIN SERPL BCP-MCNC: 2.4 G/DL (ref 3.5–5)
ALP SERPL-CCNC: 61 U/L (ref 46–116)
ALT SERPL W P-5'-P-CCNC: 36 U/L (ref 12–78)
ANION GAP SERPL CALCULATED.3IONS-SCNC: 9 MMOL/L (ref 4–13)
AST SERPL W P-5'-P-CCNC: 33 U/L (ref 5–45)
BASOPHILS # BLD MANUAL: 0 THOUSAND/UL (ref 0–0.1)
BASOPHILS NFR MAR MANUAL: 0 % (ref 0–1)
BILIRUB SERPL-MCNC: 0.5 MG/DL (ref 0.2–1)
BUN SERPL-MCNC: 22 MG/DL (ref 5–25)
CALCIUM ALBUM COR SERPL-MCNC: 9.2 MG/DL (ref 8.3–10.1)
CALCIUM SERPL-MCNC: 7.9 MG/DL (ref 8.3–10.1)
CHLORIDE SERPL-SCNC: 104 MMOL/L (ref 100–108)
CO2 SERPL-SCNC: 26 MMOL/L (ref 21–32)
CREAT SERPL-MCNC: 0.87 MG/DL (ref 0.6–1.3)
EOSINOPHIL # BLD MANUAL: 0 THOUSAND/UL (ref 0–0.4)
EOSINOPHIL NFR BLD MANUAL: 0 % (ref 0–6)
ERYTHROCYTE [DISTWIDTH] IN BLOOD BY AUTOMATED COUNT: 12.5 % (ref 11.6–15.1)
GFR SERPL CREATININE-BSD FRML MDRD: 62 ML/MIN/1.73SQ M
GLUCOSE SERPL-MCNC: 119 MG/DL (ref 65–140)
GLUCOSE SERPL-MCNC: 202 MG/DL (ref 65–140)
GLUCOSE SERPL-MCNC: 213 MG/DL (ref 65–140)
GLUCOSE SERPL-MCNC: 241 MG/DL (ref 65–140)
GLUCOSE SERPL-MCNC: 49 MG/DL (ref 65–140)
GLUCOSE SERPL-MCNC: 61 MG/DL (ref 65–140)
GLUCOSE SERPL-MCNC: 82 MG/DL (ref 65–140)
HCT VFR BLD AUTO: 35.8 % (ref 34.8–46.1)
HGB BLD-MCNC: 12.1 G/DL (ref 11.5–15.4)
HYPERCHROMIA BLD QL SMEAR: PRESENT
LYMPHOCYTES # BLD AUTO: 0.83 THOUSAND/UL (ref 0.6–4.47)
LYMPHOCYTES # BLD AUTO: 22 % (ref 14–44)
MCH RBC QN AUTO: 29.6 PG (ref 26.8–34.3)
MCHC RBC AUTO-ENTMCNC: 33.8 G/DL (ref 31.4–37.4)
MCV RBC AUTO: 88 FL (ref 82–98)
MONOCYTES # BLD AUTO: 0.15 THOUSAND/UL (ref 0–1.22)
MONOCYTES NFR BLD: 4 % (ref 4–12)
NEUTROPHILS # BLD MANUAL: 2.8 THOUSAND/UL (ref 1.85–7.62)
NEUTS BAND NFR BLD MANUAL: 1 % (ref 0–8)
NEUTS SEG NFR BLD AUTO: 73 % (ref 43–75)
NRBC BLD AUTO-RTO: 0 /100 WBCS
PLATELET # BLD AUTO: 454 THOUSANDS/UL (ref 149–390)
PLATELET BLD QL SMEAR: ADEQUATE
PMV BLD AUTO: 9.4 FL (ref 8.9–12.7)
POTASSIUM SERPL-SCNC: 3.9 MMOL/L (ref 3.5–5.3)
PROCALCITONIN SERPL-MCNC: <0.05 NG/ML
PROT SERPL-MCNC: 5.7 G/DL (ref 6.4–8.2)
RBC # BLD AUTO: 4.09 MILLION/UL (ref 3.81–5.12)
SODIUM SERPL-SCNC: 139 MMOL/L (ref 136–145)
TOTAL CELLS COUNTED SPEC: 100
WBC # BLD AUTO: 3.79 THOUSAND/UL (ref 4.31–10.16)

## 2021-04-22 PROCEDURE — 82948 REAGENT STRIP/BLOOD GLUCOSE: CPT

## 2021-04-22 PROCEDURE — 71045 X-RAY EXAM CHEST 1 VIEW: CPT

## 2021-04-22 PROCEDURE — 84145 PROCALCITONIN (PCT): CPT | Performed by: PHYSICIAN ASSISTANT

## 2021-04-22 PROCEDURE — 94760 N-INVAS EAR/PLS OXIMETRY 1: CPT

## 2021-04-22 PROCEDURE — 99232 SBSQ HOSP IP/OBS MODERATE 35: CPT | Performed by: INTERNAL MEDICINE

## 2021-04-22 PROCEDURE — 80053 COMPREHEN METABOLIC PANEL: CPT | Performed by: PHYSICIAN ASSISTANT

## 2021-04-22 PROCEDURE — 85007 BL SMEAR W/DIFF WBC COUNT: CPT | Performed by: PHYSICIAN ASSISTANT

## 2021-04-22 PROCEDURE — 85027 COMPLETE CBC AUTOMATED: CPT | Performed by: PHYSICIAN ASSISTANT

## 2021-04-22 RX ORDER — FUROSEMIDE 10 MG/ML
20 INJECTION INTRAMUSCULAR; INTRAVENOUS ONCE
Status: COMPLETED | OUTPATIENT
Start: 2021-04-22 | End: 2021-04-22

## 2021-04-22 RX ADMIN — OXYCODONE HYDROCHLORIDE AND ACETAMINOPHEN 1000 MG: 500 TABLET ORAL at 08:21

## 2021-04-22 RX ADMIN — INSULIN LISPRO 4 UNITS: 100 INJECTION, SOLUTION INTRAVENOUS; SUBCUTANEOUS at 08:21

## 2021-04-22 RX ADMIN — ENOXAPARIN SODIUM 30 MG: 30 INJECTION SUBCUTANEOUS at 21:55

## 2021-04-22 RX ADMIN — OXYCODONE HYDROCHLORIDE AND ACETAMINOPHEN 1000 MG: 500 TABLET ORAL at 21:55

## 2021-04-22 RX ADMIN — INSULIN GLARGINE 10 UNITS: 100 INJECTION, SOLUTION SUBCUTANEOUS at 21:55

## 2021-04-22 RX ADMIN — METOPROLOL TARTRATE 25 MG: 25 TABLET, FILM COATED ORAL at 08:21

## 2021-04-22 RX ADMIN — ENOXAPARIN SODIUM 30 MG: 30 INJECTION SUBCUTANEOUS at 08:22

## 2021-04-22 RX ADMIN — INSULIN LISPRO 4 UNITS: 100 INJECTION, SOLUTION INTRAVENOUS; SUBCUTANEOUS at 13:34

## 2021-04-22 RX ADMIN — GUAIFENESIN 600 MG: 600 TABLET ORAL at 21:55

## 2021-04-22 RX ADMIN — GUAIFENESIN 600 MG: 600 TABLET ORAL at 08:22

## 2021-04-22 RX ADMIN — METOPROLOL TARTRATE 25 MG: 25 TABLET, FILM COATED ORAL at 21:55

## 2021-04-22 RX ADMIN — ATORVASTATIN CALCIUM 40 MG: 40 TABLET, FILM COATED ORAL at 21:55

## 2021-04-22 RX ADMIN — ZINC SULFATE 220 MG (50 MG) CAPSULE 220 MG: CAPSULE at 08:21

## 2021-04-22 RX ADMIN — CILOSTAZOL 50 MG: 50 TABLET ORAL at 17:24

## 2021-04-22 RX ADMIN — FUROSEMIDE 20 MG: 10 INJECTION, SOLUTION INTRAMUSCULAR; INTRAVENOUS at 08:22

## 2021-04-22 RX ADMIN — REMDESIVIR 100 MG: 100 INJECTION, POWDER, LYOPHILIZED, FOR SOLUTION INTRAVENOUS at 08:31

## 2021-04-22 RX ADMIN — CILOSTAZOL 50 MG: 50 TABLET ORAL at 08:21

## 2021-04-22 RX ADMIN — LISINOPRIL 10 MG: 10 TABLET ORAL at 08:21

## 2021-04-22 RX ADMIN — Medication 2000 UNITS: at 08:21

## 2021-04-22 RX ADMIN — DEXAMETHASONE SODIUM PHOSPHATE 6 MG: 4 INJECTION, SOLUTION INTRA-ARTICULAR; INTRALESIONAL; INTRAMUSCULAR; INTRAVENOUS; SOFT TISSUE at 21:55

## 2021-04-22 RX ADMIN — FAMOTIDINE 20 MG: 20 TABLET ORAL at 08:22

## 2021-04-22 NOTE — PLAN OF CARE
Problem: Potential for Falls  Goal: Patient will remain free of falls  Description: INTERVENTIONS:  - Assess patient frequently for physical needs  -  Identify cognitive and physical deficits and behaviors that affect risk of falls    -  [place] fall precautions as indicated by assessment   - Educate patient/family on patient safety including physical limitations  - Instruct patient to call for assistance with activity based on assessment  - Modify environment to reduce risk of injury  - Consider OT/PT consult to assist with strengthening/mobility  Outcome: Progressing

## 2021-04-22 NOTE — ASSESSMENT & PLAN NOTE
Lab Results   Component Value Date    HGBA1C 8 8 (H) 04/17/2021       Recent Labs     04/21/21  1153 04/21/21  1655 04/21/21  2114 04/22/21  0814   POCGLU 167* 82 177* 202*       Blood Sugar Average: Last 72 hrs:  (P) 195 5  · Patient reports she takes oral diabetes medications at home, She is not sure which ones or doses  Denies current use of insulin or other injection medications  · Continue SSI,  Increasde regimen  · Added 10u Lantus qhs and 5u with meals  · Hemoglobin A1c suggest patient may need adjustment outpatient regimen     Recommend follow-up with outpatient provider after patient has completed course of steroids,  currently experiencing steroid induced hyperglycemia

## 2021-04-22 NOTE — ASSESSMENT & PLAN NOTE
· Likely chronic due to reduced PO intake vs SIADH from COVID pna  · Sodium 129 on admission, received IVF and now improved to 131   · Patient reports that she has been very fatigued and has had decreased p o  intake  · Na 138  · Resolved

## 2021-04-22 NOTE — ASSESSMENT & PLAN NOTE
· Came in due to generalized fatigue over the past week  Patient had a COVID test outpatient but has been waiting for the results  · COVID positive in the ED  · 84% on room air, initially placed on 2 L, escalating O2 requirements currently on 9L midflow    Attempt at decreasing O2 to 2 L yesterday patient desat with minimal activity  ·   Ordered Actemra 4/19  · Cardiac markers   · Troponin negative  · CK normal 41  · BNP 1805  · Inflammatory markers   · CRP:  157 5 > 34 7  · D-dimer elevated at 1 69 > 0 50  · Ferritin 308 > pending  · In the ED patient received ceftriaxone, doxycycline and Decadron  · Continue vitamin cocktail, Pepcid, remdesivir  · Continue patient on atorvastatin 40 mg daily  · CXR 4/21 reveals slight progression of pneumonia, repeat 4/22 overall stable  · Repeat procalcitonin pending  · Also recommended for IV diuresis per pulm, good urine output  · Appreciate ongoing pulmonology recommendations

## 2021-04-22 NOTE — PROGRESS NOTES
Timmy Bowden       Progress Note - Blanca Mckeon 1940, 79 y o  female MRN: [id number]  Unit/Bed#: -Ruben Encounter: [id number]  Primary Care Provider: Courtney Valera MD   Date and time admitted to hospital: 4/17/2021  8:22 PM    * Pneumonia due to COVID-19 virus  Assessment & Plan  · Came in due to generalized fatigue over the past week  Patient had a COVID test outpatient but has been waiting for the results  · COVID positive in the ED  · 84% on room air, initially placed on 2 L, escalating O2 requirements currently on 9L midflow  Attempt at decreasing O2 to 2 L yesterday patient desat with minimal activity  ·   Ordered Actemra 4/19  · Cardiac markers   · Troponin negative  · CK normal 41  · BNP 1805  · Inflammatory markers   · CRP:  157 5 > 34 7  · D-dimer elevated at 1 69 > 0 50  · Ferritin 308 > pending  · In the ED patient received ceftriaxone, doxycycline and Decadron  · Continue vitamin cocktail, Pepcid, remdesivir  · Continue patient on atorvastatin 40 mg daily  · CXR 4/21 reveals slight progression of pneumonia, repeat 4/22 overall stable  · Repeat procalcitonin pending  · Also recommended for IV diuresis per pulm, good urine output  · Appreciate ongoing pulmonology recommendations    Hypertension  Assessment & Plan  · Pressures have been elevated during admission  · Continue 10mg lisinopril and 25mg metoprolol BID    Acute respiratory failure with hypoxia (HCC)  Assessment & Plan  · In the setting of COVID-19  · Continue supplemental oxygen     Type 2 diabetes mellitus, without long-term current use of insulin [facility])  Assessment & Plan  Lab Results   Component Value Date    HGBA1C 8 8 (H) 04/17/2021       Recent Labs     04/21/21  1153 04/21/21  1655 04/21/21  2114 04/22/21  0814   POCGLU 167* 82 177* 202*       Blood Sugar Average: Last 72 hrs:  (P) 195 5  · Patient reports she takes oral diabetes medications at home, She is not sure which ones or doses   Denies current use of insulin or other injection medications  · Continue SSI,  Increasde regimen  · Added 10u Lantus qhs and 5u with meals  · Hemoglobin A1c suggest patient may need adjustment outpatient regimen  Recommend follow-up with outpatient provider after patient has completed course of steroids,  currently experiencing steroid induced hyperglycemia    Hyponatremia-resolved as of 2021  Assessment & Plan  · Likely chronic due to reduced PO intake vs SIADH from COVID pna  · Sodium 129 on admission, received IVF and now improved to 131   · Patient reports that she has been very fatigued and has had decreased p o  intake  · Na 138  · Resolved    VTE Pharmacologic Prophylaxis:   Pharmacologic: Enoxaparin (Lovenox)  Mechanical VTE Prophylaxis in Place: Yes    Patient Centered Rounds: I have performed bedside rounds with nursing staff today  Discussions with Specialists or Other Care Team Provider: Nursing, CM, attending    Education and Discussions with Family / Patient: Discussed with patient directly at bedside  Appreciate attending discussing with patient's daughter via phone  Time Spent for Care: 20 minutes  More than 50% of total time spent on counseling and coordination of care as described above  Current Length of Stay: 5 day(s)    Current Patient Status: Inpatient   Certification Statement: The patient will continue to require additional inpatient hospital stay due to Acute respiratory failure with hypoxia secondary to COVID    Discharge Plan:  Pending clinical course    Code Status: Level 1 - Full Code      Subjective:   Patient states she overall feels improved today  Not as short of breath  Denies chest pain/palpitations, nausea/vomiting, abdominal pain  Still occasional dry cough      Objective:     Vitals:   Temp (24hrs), Av 8 °F (36 6 °C), Min:97 4 °F (36 3 °C), Max:98 1 °F (36 7 °C)    Temp:  [97 4 °F (36 3 °C)-98 1 °F (36 7 °C)] 97 4 °F (36 3 °C)  HR:  [58-65] 58  Resp:  [20] 20  BP: (108-178)/(57-72) 178/71  SpO2:  [87 %-95 %] 94 %  Body mass index is 31 13 kg/m²  Input and Output Summary (last 24 hours): Intake/Output Summary (Last 24 hours) at 4/22/2021 1216  Last data filed at 4/22/2021 0900  Gross per 24 hour   Intake 960 ml   Output 3250 ml   Net -2290 ml       Physical Exam:     Physical Exam  Vitals signs and nursing note reviewed  Constitutional:       Appearance: Normal appearance  Interventions: Nasal cannula in place  Comments: Appears comfortable, no acute distress   HENT:      Head: Normocephalic  Eyes:      General: No scleral icterus  Extraocular Movements: Extraocular movements intact  Conjunctiva/sclera: Conjunctivae normal    Neck:      Musculoskeletal: Normal range of motion  Cardiovascular:      Rate and Rhythm: Normal rate and regular rhythm  Heart sounds: S1 normal and S2 normal    Pulmonary:      Breath sounds: Examination of the right-lower field reveals rales  Decreased breath sounds and rales present  No wheezing or rhonchi  Comments: Overall diminished breath sounds bilaterally, faint crackles right lower lobe  Abdominal:      General: Bowel sounds are normal       Palpations: Abdomen is soft  Tenderness: There is no abdominal tenderness  There is no guarding or rebound  Musculoskeletal:         General: No swelling, tenderness or deformity  Comments: Able to move upper/lower extremities bilaterally, no edema   Skin:     General: Skin is warm and dry  Neurological:      Mental Status: She is alert and oriented to person, place, and time     Psychiatric:         Mood and Affect: Mood normal          Speech: Speech normal          Behavior: Behavior normal            Additional Data:     Labs:    Results from last 7 days   Lab Units 04/22/21  0451  04/17/21  2117   WBC Thousand/uL 3 79*   < > 5 35   HEMOGLOBIN g/dL 12 1   < > 13 4   HEMATOCRIT % 35 8   < > 39 0   PLATELETS Thousands/uL 454*   < > 275   BANDS PCT % 1   < >  --    NEUTROS PCT %  --   --  80*   LYMPHS PCT %  --   --  15   LYMPHO PCT % 22   < >  --    MONOS PCT %  --   --  5   MONO PCT % 4   < >  --    EOS PCT % 0   < > 0    < > = values in this interval not displayed  Results from last 7 days   Lab Units 04/22/21  0451   SODIUM mmol/L 139   POTASSIUM mmol/L 3 9   CHLORIDE mmol/L 104   CO2 mmol/L 26   BUN mg/dL 22   CREATININE mg/dL 0 87   ANION GAP mmol/L 9   CALCIUM mg/dL 7 9*   ALBUMIN g/dL 2 4*   TOTAL BILIRUBIN mg/dL 0 50   ALK PHOS U/L 61   ALT U/L 36   AST U/L 33   GLUCOSE RANDOM mg/dL 213*     Results from last 7 days   Lab Units 04/17/21 2117   INR  0 99     Results from last 7 days   Lab Units 04/22/21  0814 04/21/21  2114 04/21/21  1655 04/21/21  1153 04/21/21  0800 04/20/21  2054 04/20/21  1718 04/20/21  1219 04/19/21  2216 04/19/21  1734 04/19/21  1113 04/19/21  0722   POC GLUCOSE mg/dl 202* 177* 82 167* 220* 185* 184* 231* 195* 237* 228* 238*     Results from last 7 days   Lab Units 04/17/21 2117   HEMOGLOBIN A1C % 8 8*     Results from last 7 days   Lab Units 04/21/21  0530 04/18/21  0630 04/17/21 2117   LACTIC ACID mmol/L  --   --  1 4   PROCALCITONIN ng/ml <0 05 <0 05 <0 05           * I Have Reviewed All Lab Data Listed Above  * Additional Pertinent Lab Tests Reviewed: All Labs Within Last 24 Hours Reviewed    Imaging:    Imaging Reports Reviewed Today Include: CXR  Imaging Personally Reviewed by Myself Includes:      Recent Cultures (last 7 days):     Results from last 7 days   Lab Units 04/17/21 2155 04/17/21 2117   BLOOD CULTURE  No Growth After 4 Days  No Growth After 4 Days         Last 24 Hours Medication List:   Current Facility-Administered Medications   Medication Dose Route Frequency Provider Last Rate    acetaminophen  650 mg Oral Q6H PRN Ion Sutton PA-C      ascorbic acid  1,000 mg Oral Q12H [address] Brook Marquez PA-C      atorvastatin  40 mg Oral HS Brook Marquez PA-C      benzonatate  100 mg Oral TID PRN Tommie CHAUDHRY Fer Krishnan MD      cholecalciferol  2,000 Units Oral Daily [facility]      cilostazol  50 mg Oral BID Kely Garcia MD      dexamethasone  6 mg Intravenous Q24H [facility]      enoxaparin  30 mg Subcutaneous Q12H [facility]      famotidine  20 mg Oral Daily [facility]      guaiFENesin  600 mg Oral Q12H [facility] Kely Garcia MD      insulin glargine  10 Units Subcutaneous HS Bernard Sousa MD      insulin lispro  1-6 Units Subcutaneous HS Cinthya PÉREZ PA-C      insulin lispro  2-12 Units Subcutaneous TID AC Cinthya PÉREZ PA-C      insulin lispro  5 Units Subcutaneous TID With Meals Bernard Sousa MD      lisinopril  10 mg Oral Daily Latasha Yaneth PÉREZ PA-C      metoprolol tartrate  25 mg Oral Q12H [facility], MD      zinc sulfate  220 mg Oral Daily Brook Marquez PA-C      Followed by   Danyelle Rich ON 4/25/2021] multivitamin-minerals  1 tablet Oral Daily [facility], [facility]      ondansetron  4 mg Intravenous Q6H PRN [facility]          Today, Patient Was Seen By: Thania Liu PA-C    ** Please Note: Dictation voice to text software may have been used in the creation of this document   **

## 2021-04-23 LAB
ALBUMIN SERPL BCP-MCNC: 2.4 G/DL (ref 3.5–5)
ALP SERPL-CCNC: 63 U/L (ref 46–116)
ALT SERPL W P-5'-P-CCNC: 49 U/L (ref 12–78)
ANION GAP SERPL CALCULATED.3IONS-SCNC: 13 MMOL/L (ref 4–13)
AST SERPL W P-5'-P-CCNC: 37 U/L (ref 5–45)
BACTERIA BLD CULT: NORMAL
BACTERIA BLD CULT: NORMAL
BILIRUB SERPL-MCNC: 0.5 MG/DL (ref 0.2–1)
BUN SERPL-MCNC: 20 MG/DL (ref 5–25)
CALCIUM ALBUM COR SERPL-MCNC: 9.6 MG/DL (ref 8.3–10.1)
CALCIUM SERPL-MCNC: 8.3 MG/DL (ref 8.3–10.1)
CHLORIDE SERPL-SCNC: 103 MMOL/L (ref 100–108)
CO2 SERPL-SCNC: 22 MMOL/L (ref 21–32)
CREAT SERPL-MCNC: 0.92 MG/DL (ref 0.6–1.3)
CRP SERPL QL: 17.2 MG/L
D DIMER PPP FEU-MCNC: 0.77 UG/ML FEU
ERYTHROCYTE [DISTWIDTH] IN BLOOD BY AUTOMATED COUNT: 12.6 % (ref 11.6–15.1)
GFR SERPL CREATININE-BSD FRML MDRD: 58 ML/MIN/1.73SQ M
GLUCOSE SERPL-MCNC: 119 MG/DL (ref 65–140)
GLUCOSE SERPL-MCNC: 153 MG/DL (ref 65–140)
GLUCOSE SERPL-MCNC: 190 MG/DL (ref 65–140)
GLUCOSE SERPL-MCNC: 217 MG/DL (ref 65–140)
GLUCOSE SERPL-MCNC: 230 MG/DL (ref 65–140)
HCT VFR BLD AUTO: 37.8 % (ref 34.8–46.1)
HGB BLD-MCNC: 12.6 G/DL (ref 11.5–15.4)
MCH RBC QN AUTO: 29.8 PG (ref 26.8–34.3)
MCHC RBC AUTO-ENTMCNC: 33.3 G/DL (ref 31.4–37.4)
MCV RBC AUTO: 89 FL (ref 82–98)
PLATELET # BLD AUTO: 521 THOUSANDS/UL (ref 149–390)
PMV BLD AUTO: 9.2 FL (ref 8.9–12.7)
POTASSIUM SERPL-SCNC: 4.1 MMOL/L (ref 3.5–5.3)
PROT SERPL-MCNC: 5.6 G/DL (ref 6.4–8.2)
RBC # BLD AUTO: 4.23 MILLION/UL (ref 3.81–5.12)
SODIUM SERPL-SCNC: 138 MMOL/L (ref 136–145)
WBC # BLD AUTO: 4.64 THOUSAND/UL (ref 4.31–10.16)

## 2021-04-23 PROCEDURE — 82948 REAGENT STRIP/BLOOD GLUCOSE: CPT

## 2021-04-23 PROCEDURE — 99232 SBSQ HOSP IP/OBS MODERATE 35: CPT | Performed by: INTERNAL MEDICINE

## 2021-04-23 PROCEDURE — 85027 COMPLETE CBC AUTOMATED: CPT | Performed by: PHYSICIAN ASSISTANT

## 2021-04-23 PROCEDURE — 86140 C-REACTIVE PROTEIN: CPT | Performed by: PHYSICIAN ASSISTANT

## 2021-04-23 PROCEDURE — 94760 N-INVAS EAR/PLS OXIMETRY 1: CPT

## 2021-04-23 PROCEDURE — 85379 FIBRIN DEGRADATION QUANT: CPT | Performed by: PHYSICIAN ASSISTANT

## 2021-04-23 PROCEDURE — 80053 COMPREHEN METABOLIC PANEL: CPT | Performed by: PHYSICIAN ASSISTANT

## 2021-04-23 RX ORDER — FUROSEMIDE 10 MG/ML
40 INJECTION INTRAMUSCULAR; INTRAVENOUS DAILY
Status: DISCONTINUED | OUTPATIENT
Start: 2021-04-24 | End: 2021-04-26

## 2021-04-23 RX ORDER — FUROSEMIDE 10 MG/ML
20 INJECTION INTRAMUSCULAR; INTRAVENOUS ONCE
Status: COMPLETED | OUTPATIENT
Start: 2021-04-23 | End: 2021-04-23

## 2021-04-23 RX ADMIN — ZINC SULFATE 220 MG (50 MG) CAPSULE 220 MG: CAPSULE at 08:48

## 2021-04-23 RX ADMIN — FUROSEMIDE 20 MG: 10 INJECTION, SOLUTION INTRAMUSCULAR; INTRAVENOUS at 10:44

## 2021-04-23 RX ADMIN — INSULIN LISPRO 2 UNITS: 100 INJECTION, SOLUTION INTRAVENOUS; SUBCUTANEOUS at 17:54

## 2021-04-23 RX ADMIN — INSULIN LISPRO 4 UNITS: 100 INJECTION, SOLUTION INTRAVENOUS; SUBCUTANEOUS at 08:50

## 2021-04-23 RX ADMIN — METOPROLOL TARTRATE 25 MG: 25 TABLET, FILM COATED ORAL at 21:27

## 2021-04-23 RX ADMIN — GUAIFENESIN 600 MG: 600 TABLET ORAL at 21:27

## 2021-04-23 RX ADMIN — OXYCODONE HYDROCHLORIDE AND ACETAMINOPHEN 1000 MG: 500 TABLET ORAL at 21:27

## 2021-04-23 RX ADMIN — FAMOTIDINE 20 MG: 20 TABLET ORAL at 08:48

## 2021-04-23 RX ADMIN — DEXAMETHASONE SODIUM PHOSPHATE 6 MG: 4 INJECTION, SOLUTION INTRA-ARTICULAR; INTRALESIONAL; INTRAMUSCULAR; INTRAVENOUS; SOFT TISSUE at 21:29

## 2021-04-23 RX ADMIN — INSULIN GLARGINE 10 UNITS: 100 INJECTION, SOLUTION SUBCUTANEOUS at 21:29

## 2021-04-23 RX ADMIN — ENOXAPARIN SODIUM 30 MG: 30 INJECTION SUBCUTANEOUS at 21:28

## 2021-04-23 RX ADMIN — METOPROLOL TARTRATE 25 MG: 25 TABLET, FILM COATED ORAL at 08:48

## 2021-04-23 RX ADMIN — ATORVASTATIN CALCIUM 40 MG: 40 TABLET, FILM COATED ORAL at 21:27

## 2021-04-23 RX ADMIN — LISINOPRIL 10 MG: 10 TABLET ORAL at 08:48

## 2021-04-23 RX ADMIN — CILOSTAZOL 50 MG: 50 TABLET ORAL at 08:48

## 2021-04-23 RX ADMIN — CILOSTAZOL 50 MG: 50 TABLET ORAL at 17:53

## 2021-04-23 RX ADMIN — GUAIFENESIN 600 MG: 600 TABLET ORAL at 08:48

## 2021-04-23 RX ADMIN — OXYCODONE HYDROCHLORIDE AND ACETAMINOPHEN 1000 MG: 500 TABLET ORAL at 08:48

## 2021-04-23 RX ADMIN — Medication 2000 UNITS: at 08:48

## 2021-04-23 RX ADMIN — INSULIN LISPRO 2 UNITS: 100 INJECTION, SOLUTION INTRAVENOUS; SUBCUTANEOUS at 12:40

## 2021-04-23 RX ADMIN — ENOXAPARIN SODIUM 30 MG: 30 INJECTION SUBCUTANEOUS at 08:49

## 2021-04-23 NOTE — PLAN OF CARE
Problem: Potential for Falls  Goal: Patient will remain free of falls  Description: INTERVENTIONS:  - Assess patient frequently for physical needs  -  Identify cognitive and physical deficits and behaviors that affect risk of falls  -  [place] fall precautions as indicated by assessment   - Educate patient/family on patient safety including physical limitations  - Instruct patient to call for assistance with activity based on assessment  - Modify environment to reduce risk of injury  - Consider OT/PT consult to assist with strengthening/mobility  Outcome: Progressing     Problem: Nutrition/Hydration-ADULT  Goal: Nutrient/Hydration intake appropriate for improving, restoring or maintaining nutritional needs  Description: Monitor and assess patient's nutrition/hydration status for malnutrition  Collaborate with interdisciplinary team and initiate plan and interventions as ordered  Monitor patient's weight and dietary intake as ordered or per policy  Utilize nutrition screening tool and intervene as necessary  Determine patient's food preferences and provide high-protein, high-caloric foods as appropriate       INTERVENTIONS:  - Monitor oral intake, urinary output, labs, and treatment plans  - Assess nutrition and hydration status and recommend course of action  - Evaluate amount of meals eaten  - Assist patient with eating if necessary   - Allow adequate time for meals  - Recommend/ encourage appropriate diets, oral nutritional supplements, and vitamin/mineral supplements  - Order, calculate, and assess calorie counts as needed  - Recommend, monitor, and adjust tube feedings and TPN/PPN based on assessed needs  - Assess need for intravenous fluids  - Provide specific nutrition/hydration education as appropriate  - Include patient/family/caregiver in decisions related to nutrition  Outcome: Progressing     Problem: PAIN - ADULT  Goal: Verbalizes/displays adequate comfort level or baseline comfort level  Description: Interventions:  - Encourage patient to monitor pain and request assistance  - Assess pain using appropriate pain scale  - Administer analgesics based on type and severity of pain and evaluate response  - Implement non-pharmacological measures as appropriate and evaluate response  - Consider cultural and social influences on pain and pain management  - Notify physician/advanced practitioner if interventions unsuccessful or patient reports new pain  Outcome: Progressing     Problem: INFECTION - ADULT  Goal: Absence or prevention of progression during hospitalization  Description: INTERVENTIONS:  - Assess and monitor for signs and symptoms of infection  - Monitor lab/diagnostic results  - Monitor all insertion sites, i e  indwelling lines, tubes, and drains  - Monitor endotracheal if appropriate and nasal secretions for changes in amount and color  - [place] appropriate cooling/warming therapies per order  - Administer medications as ordered  - Instruct and encourage patient and family to use good hand hygiene technique  - Identify and instruct in appropriate isolation precautions for identified infection/condition  Outcome: Progressing     Problem: SAFETY ADULT  Goal: Patient will remain free of falls  Description: INTERVENTIONS:  - Assess patient frequently for physical needs  -  Identify cognitive and physical deficits and behaviors that affect risk of falls    -  [place] fall precautions as indicated by assessment   - Educate patient/family on patient safety including physical limitations  - Instruct patient to call for assistance with activity based on assessment  - Modify environment to reduce risk of injury  - Consider OT/PT consult to assist with strengthening/mobility  Outcome: Progressing  Goal: Maintain or return to baseline ADL function  Description: INTERVENTIONS:  -  Assess patient's ability to carry out ADLs; assess patient's baseline for ADL function and identify physical deficits which impact ability to perform ADLs (bathing, care of mouth/teeth, toileting, grooming, dressing, etc )  - Assess/evaluate cause of self-care deficits   - Assess range of motion  - Assess patient's mobility; develop plan if impaired  - Assess patient's need for assistive devices and provide as appropriate  - Encourage maximum independence but intervene and supervise when necessary  - Involve family in performance of ADLs  - Assess for home care needs following discharge   - Consider OT consult to assist with ADL evaluation and planning for discharge  - Provide patient education as appropriate  Outcome: Progressing  Goal: Maintain or return mobility status to optimal level  Description: INTERVENTIONS:  - Assess patient's baseline mobility status (ambulation, transfers, stairs, etc )    - Identify cognitive and physical deficits and behaviors that affect mobility  - Identify mobility aids required to assist with transfers and/or ambulation (gait belt, sit-to-stand, lift, walker, cane, etc )  - [place] fall precautions as indicated by assessment  - Record patient progress and toleration of activity level on Mobility SBAR; progress patient to next Phase/Stage  - Instruct patient to call for assistance with activity based on assessment  - Consider rehabilitation consult to assist with strengthening/weightbearing, etc   Outcome: Progressing     Problem: DISCHARGE PLANNING  Goal: Discharge to home or other facility with appropriate resources  Description: INTERVENTIONS:  - Identify barriers to discharge w/patient and caregiver  - Arrange for needed discharge resources and transportation as appropriate  - Identify discharge learning needs (meds, wound care, etc )  - Arrange for interpretive services to assist at discharge as needed  - Refer to Case Management Department for coordinating discharge planning if the patient needs post-hospital services based on physician/advanced practitioner order or complex needs related to functional status, cognitive ability, or social support system  Outcome: Progressing     Problem: Knowledge Deficit  Goal: Patient/family/caregiver demonstrates understanding of disease process, treatment plan, medications, and discharge instructions  Description: Complete learning assessment and assess knowledge base    Interventions:  - Provide teaching at level of understanding  - Provide teaching via preferred learning methods  Outcome: Progressing     Problem: RESPIRATORY - ADULT  Goal: Achieves optimal ventilation and oxygenation  Description: INTERVENTIONS:  - Assess for changes in respiratory status  - Assess for changes in mentation and behavior  - Position to facilitate oxygenation and minimize respiratory effort  - Oxygen administered by appropriate delivery if ordered  - Initiate smoking cessation education as indicated  - Encourage broncho-pulmonary hygiene including cough, deep breathe, Incentive Spirometry  - Assess the need for suctioning and aspirate as needed  - Assess and instruct to report SOB or any respiratory difficulty  - Respiratory Therapy support as indicated  Outcome: Progressing     Problem: MUSCULOSKELETAL - ADULT  Goal: Maintain or return mobility to safest level of function  Description: INTERVENTIONS:  - Assess patient's ability to carry out ADLs; assess patient's baseline for ADL function and identify physical deficits which impact ability to perform ADLs (bathing, care of mouth/teeth, toileting, grooming, dressing, etc )  - Assess/evaluate cause of self-care deficits   - Assess range of motion  - Assess patient's mobility  - Assess patient's need for assistive devices and provide as appropriate  - Encourage maximum independence but intervene and supervise when necessary  - Involve family in performance of ADLs  - Assess for home care needs following discharge   - Consider OT consult to assist with ADL evaluation and planning for discharge  - Provide patient education as appropriate  Outcome: Progressing     Problem: Prexisting or High Potential for Compromised Skin Integrity  Goal: Skin integrity is maintained or improved  Description: INTERVENTIONS:  - Identify patients at risk for skin breakdown  - Assess and monitor skin integrity  - Assess and monitor nutrition and hydration status  - Monitor labs   - Assess for incontinence   - Turn and reposition patient  - Assist with mobility/ambulation  - Relieve pressure over bony prominences  - Avoid friction and shearing  - Provide appropriate hygiene as needed including keeping skin clean and dry  - Evaluate need for skin moisturizer/barrier cream  - Collaborate with interdisciplinary team   - Patient/family teaching  - Consider wound care consult   Outcome: Progressing

## 2021-04-23 NOTE — ASSESSMENT & PLAN NOTE
Lab Results   Component Value Date    HGBA1C 8 8 (H) 04/17/2021       Recent Labs     04/22/21  1748 04/22/21  1822 04/22/21  2154 04/23/21  0830   POCGLU 61* 82 119 217*       Blood Sugar Average: Last 72 hrs:  (P) 158 [id number]  · Patient reports she takes oral diabetes medications at home, She is not sure which ones or doses  Denies current use of insulin or other injection medications  · Continue SSI  · Added 10u Lantus qhs and 5u with meals  · Hemoglobin A1c suggest patient may need adjustment outpatient regimen  Recommend follow-up with outpatient provider after patient has completed course of steroids

## 2021-04-23 NOTE — PROGRESS NOTES
Progress Note - Pulmonary   Ismael Ashly 79 y o  female MRN: [id number]  Unit/Bed#: -Ruben Encounter: [id number]      Assessment:  1  Acute hypoxic respiratory failure  2  COVID 19 pneumonia    Plan:  1  Continue Decadron 6mg daily  2  Completed Remdesivir  3  Continue Vimatin C,D and Zinc  4  Continue Pepcid, Lipitor  5  Wean oxygen as tolerated  Subjective:   Patient seen and examined  She states that she is feeling a bit better  She continues to have a dry cough  Objective:   Vitals: Blood pressure 148/67, pulse 57, temperature (!) 97 3 °F (36 3 °C), resp  rate 20, height 5' 2" (1 575 m), weight 77 2 kg (170 lb 3 1 oz), SpO2 97 %  ,Midflow 10L , Body mass index is 31 13 kg/m²  Intake/Output Summary (Last 24 hours) at 4/22/2021 2249  Last data filed at 4/22/2021 1901  Gross per 24 hour   Intake 600 ml   Output 2700 ml   Net -2100 ml         Physical Exam  Patient seen through window  Exam deferred to avoid further consuming PPE and further exposures  Labs: I have personally reviewed pertinent lab results    Results from last 7 days   Lab Units 04/22/21  0451 04/20/21  0542 04/18/21  0611 04/17/21 2117   WBC Thousand/uL 3 79* 6 03 3 42* 5 35   HEMOGLOBIN g/dL 12 1 12 3 12 9 13 4   HEMATOCRIT % 35 8 37 1 38 2 39 0   PLATELETS Thousands/uL 454* 416* 307 275   NEUTROS PCT %  --   --   --  80*   MONOS PCT %  --   --   --  5   MONO PCT % 4  --  0*  --       Results from last 7 days   Lab Units 04/22/21  0451 04/21/21  0637 04/20/21  0542   POTASSIUM mmol/L 3 9 4 1 3 9   CHLORIDE mmol/L 104 105 106   CO2 mmol/L 26 22 19*   BUN mg/dL 22 23 21   CREATININE mg/dL 0 87 0 78 0 67   CALCIUM mg/dL 7 9* 8 2* 8 3   ALK PHOS U/L 61 54 57   ALT U/L 36 30 25   AST U/L 33 24 21     Results from last 7 days   Lab Units 04/17/21 2117   MAGNESIUM mg/dL 2 0          Results from last 7 days   Lab Units 04/17/21 2117   INR  0 99   PTT seconds 35     Results from last 7 days   Lab Units 04/17/21 2117 LACTIC ACID mmol/L 1 4     0   Lab Value Date/Time    TROPONINI <0 02 04/17/2021 2117         Meds/Allergies   Current Facility-Administered Medications   Medication Dose Route Frequency    acetaminophen (TYLENOL) tablet 650 mg  650 mg Oral Q6H PRN    ascorbic acid (VITAMIN C) tablet 1,000 mg  1,000 mg Oral Q12H [facility]    atorvastatin (LIPITOR) tablet 40 mg  40 mg Oral HS    benzonatate (TESSALON PERLES) capsule 100 mg  100 mg Oral TID PRN    cholecalciferol (VITAMIN D3) tablet 2,000 Units  2,000 Units Oral Daily    cilostazol (PLETAL) tablet 50 mg  50 mg Oral BID    dexamethasone (DECADRON) injection 6 mg  6 mg Intravenous Q24H    enoxaparin (LOVENOX) subcutaneous injection 30 mg  30 mg Subcutaneous Q12H [facility]    famotidine (PEPCID) tablet 20 mg  20 mg Oral Daily    guaiFENesin (MUCINEX) 12 hr tablet 600 mg  600 mg Oral Q12H [facility]    insulin glargine (LANTUS) subcutaneous injection 10 Units 0 1 mL  10 Units Subcutaneous HS    insulin lispro (HumaLOG) 100 units/mL subcutaneous injection 1-6 Units  1-6 Units Subcutaneous HS    insulin lispro (HumaLOG) 100 units/mL subcutaneous injection 2-12 Units  2-12 Units Subcutaneous TID AC    insulin lispro (HumaLOG) 100 units/mL subcutaneous injection 5 Units  5 Units Subcutaneous TID With Meals    lisinopril (ZESTRIL) tablet 10 mg  10 mg Oral Daily    metoprolol tartrate (LOPRESSOR) tablet 25 mg  25 mg Oral Q12H [facility]    zinc sulfate (ZINCATE) capsule 220 mg  220 mg Oral Daily    Followed by   Denise Bermudez ON 4/25/2021] multivitamin-minerals (CENTRUM ADULTS) tablet 1 tablet  1 tablet Oral Daily    ondansetron (ZOFRAN) injection 4 mg  4 mg Intravenous Q6H PRN     Medications Prior to Admission   Medication    cilostazol (PLETAL) 50 mg tablet         Microbiology:  Lab Results   Component Value Date    BLOODCX No Growth After 4 Days  04/17/2021    BLOODCX No Growth After 4 Days  04/17/2021       Imaging and other studies: I have personally reviewed pertinent reports  CXR - IMPRESSION:  No definite change in bilateral groundglass opacity, slightly greater on the right, due to Covid-19      DO Kelby Phan [facility]

## 2021-04-23 NOTE — PHYSICAL THERAPY NOTE
PT d/c  Order rec'd chart reviewe  Follow pt since adm  Remains on hiflow 02 and desat with slightest activity  D/c at present  Please reconsult when medically appropriate

## 2021-04-23 NOTE — PROGRESS NOTES
Progress Note - Pulmonary   Rosana Garcia 79 y o  female MRN: [id number]  Unit/Bed#: -Ruben Encounter: [id number]      Assessment:  1  Acute hypoxic respiratory failure  2  COVID 19 pneumonia     Plan:  1  She continues to do well  Will wean oxygen to 8L  2  Continue Decadron 6mg daily  3  Completed Remdesivir  4  Continue Vimatin C,D and Zinc  5  Continue Pepcid, Lipitor    Will follow on Monday if still admitted  Subjective:   She states that she continues to feel a bit better  She notes the breathing is slowly improving  She has a mild cough  Objective:   Vitals: Blood pressure (!) 172/64, pulse (!) 49, temperature (!) 97 4 °F (36 3 °C), resp  rate 21, height 5' 2" (1 575 m), weight 77 2 kg (170 lb 3 1 oz), SpO2 95 %  , 8L NC, Body mass index is 31 13 kg/m²  Intake/Output Summary (Last 24 hours) at 4/23/2021 1741  Last data filed at 4/23/2021 0955  Gross per 24 hour   Intake 600 ml   Output 800 ml   Net -200 ml         Physical Exam  Gen: Awake, alert, oriented x 3, no acute distress  HEENT: Mucous membranes moist, no oral lesions, no thrush  NECK: No accessory muscle use, JVP not elevated  Cardiac: Regular, single S1, single S2, no murmurs, no rubs, no gallops  Lungs: Crackles in lung bases  No wheezing or rhonchi  Abdomen: normoactive bowel sounds, soft nontender, nondistended, no rebound or rigidity, no guarding  Extremities: no cyanosis, no clubbing, no edema    Labs: I have personally reviewed pertinent lab results    Results from last 7 days   Lab Units 04/23/21  0539 04/22/21  0451 04/20/21  0542 04/18/21  0611 04/17/21  2117   WBC Thousand/uL 4 64 3 79* 6 03 3 42* 5 35   HEMOGLOBIN g/dL 12 6 12 1 12 3 12 9 13 4   HEMATOCRIT % 37 8 35 8 37 1 38 2 39 0   PLATELETS Thousands/uL 521* 454* 416* 307 275   NEUTROS PCT %  --   --   --   --  80*   MONOS PCT %  --   --   --   --  5   MONO PCT %  --  4  --  0*  --       Results from last 7 days   Lab Units 04/23/21  0539 04/22/21  0451 04/21/21  0637   POTASSIUM mmol/L 4 1 3 9 4 1   CHLORIDE mmol/L 103 104 105   CO2 mmol/L 22 26 22   BUN mg/dL 20 22 23   CREATININE mg/dL 0 92 0 87 0 78   CALCIUM mg/dL 8 3 7 9* 8 2*   ALK PHOS U/L 63 61 54   ALT U/L 49 36 30   AST U/L 37 33 24     Results from last 7 days   Lab Units 04/17/21 2117   MAGNESIUM mg/dL 2 0          Results from last 7 days   Lab Units 04/17/21 2117   INR  0 99   PTT seconds 35     Results from last 7 days   Lab Units 04/17/21 2117   LACTIC ACID mmol/L 1 4     0   Lab Value Date/Time    TROPONINI <0 02 04/17/2021 2117         Meds/Allergies   Current Facility-Administered Medications   Medication Dose Route Frequency    acetaminophen (TYLENOL) tablet 650 mg  650 mg Oral Q6H PRN    ascorbic acid (VITAMIN C) tablet 1,000 mg  1,000 mg Oral Q12H [address]    atorvastatin (LIPITOR) tablet 40 mg  40 mg Oral HS    benzonatate (TESSALON PERLES) capsule 100 mg  100 mg Oral TID PRN    cholecalciferol (VITAMIN D3) tablet 2,000 Units  2,000 Units Oral Daily    cilostazol (PLETAL) tablet 50 mg  50 mg Oral BID    dexamethasone (DECADRON) injection 6 mg  6 mg Intravenous Q24H    enoxaparin (LOVENOX) subcutaneous injection 30 mg  30 mg Subcutaneous Q12H [facility]    famotidine (PEPCID) tablet 20 mg  20 mg Oral Daily    guaiFENesin (MUCINEX) 12 hr tablet 600 mg  600 mg Oral Q12H [facility]    insulin glargine (LANTUS) subcutaneous injection 10 Units 0 1 mL  10 Units Subcutaneous HS    insulin lispro (HumaLOG) 100 units/mL subcutaneous injection 1-6 Units  1-6 Units Subcutaneous HS    insulin lispro (HumaLOG) 100 units/mL subcutaneous injection 2-12 Units  2-12 Units Subcutaneous TID AC    insulin lispro (HumaLOG) 100 units/mL subcutaneous injection 3 Units  3 Units Subcutaneous TID With Meals    lisinopril (ZESTRIL) tablet 10 mg  10 mg Oral Daily    metoprolol tartrate (LOPRESSOR) tablet 25 mg  25 mg Oral Q12H [facility]    zinc sulfate (ZINCATE) capsule 220 mg  220 mg Oral Daily    Followed by   Vipin Madera ON 4/25/2021] multivitamin-minerals (CENTRUM ADULTS) tablet 1 tablet  1 tablet Oral Daily    ondansetron (ZOFRAN) injection 4 mg  4 mg Intravenous Q6H PRN     Medications Prior to Admission   Medication    cilostazol (PLETAL) 50 mg tablet         Microbiology:  Lab Results   Component Value Date    BLOODCX No Growth After 5 Days  04/17/2021    BLOODCX No Growth After 5 Days  04/17/2021     Imaging and other studies: I have personally reviewed pertinent reports      No new images today    DO Kelby Rhodes [facility]

## 2021-04-23 NOTE — ASSESSMENT & PLAN NOTE
· Came in due to generalized fatigue over the past week  Patient had a COVID test outpatient but has been waiting for the results  · COVID positive in the ED  · 84% on room air, initially placed on 2 L, escalating O2 requirements currently on 9L midflow  Attempt at decreasing O2 to 2 L yesterday patient desat with minimal activity  ·   Ordered Actemra 4/19  · Cardiac markers   · Troponin negative  · CK normal 41  · BNP 1805  · Inflammatory markers   · CRP:  157 5 > 34 7  · D-dimer elevated at 1 69 > 0 50  · Ferritin 308 > pending  · In the ED patient received ceftriaxone, doxycycline and Decadron  · Continue vitamin cocktail, Pepcid  · Completed remdesivir  · Continue patient on atorvastatin 40 mg daily  · CXR 4/21 reveals slight progression of pneumonia, repeat 4/22 overall stable  · Repeat procalcitonin normal  · Also recommended for IV diuresis per pulm previously, good urine output    Bilateral crackles on exam this AM now up again to 10 L midflow, will give additional lasix 20 mg IV x1  · Appreciate ongoing pulmonology recommendations

## 2021-04-23 NOTE — PROGRESS NOTES
[place]     Progress Note - Lourdes Cantor 1940, 79 y o  female MRN: [id number]  Unit/Bed#: -Ruben Encounter: [id number]  Primary Care Provider: Abraham Meyers MD   Date and time admitted to hospital: 4/17/2021  8:22 PM    * Pneumonia due to COVID-19 virus  Assessment & Plan  · Came in due to generalized fatigue over the past week  Patient had a COVID test outpatient but has been waiting for the results  · COVID positive in the ED  · 84% on room air, initially placed on 2 L, escalating O2 requirements currently on 9L midflow  Attempt at decreasing O2 to 2 L yesterday patient desat with minimal activity  ·   Ordered Actemra 4/19  · Cardiac markers   · Troponin negative  · CK normal 41  · BNP 1805  · Inflammatory markers   · CRP:  157 5 > 34 7  · D-dimer elevated at 1 69 > 0 50  · Ferritin 308 > pending  · In the ED patient received ceftriaxone, doxycycline and Decadron  · Continue vitamin cocktail, Pepcid  · Completed remdesivir  · Continue patient on atorvastatin 40 mg daily  · CXR 4/21 reveals slight progression of pneumonia, repeat 4/22 overall stable  · Repeat procalcitonin normal  · Also recommended for IV diuresis per pulm previously, good urine output    Bilateral crackles on exam this AM now up again to 10 L midflow, will give additional lasix 20 mg IV x1  · Appreciate ongoing pulmonology recommendations    Hypertension  Assessment & Plan  · Pressures have been elevated during admission  · Continue 10mg lisinopril and 25mg metoprolol BID    Acute respiratory failure with hypoxia (HCC)  Assessment & Plan  · In the setting of COVID-19  · Continue supplemental oxygen     Type 2 diabetes mellitus, without long-term current use of insulin [facility])  Assessment & Plan  Lab Results   Component Value Date    HGBA1C 8 8 (H) 04/17/2021       Recent Labs     04/22/21  1748 04/22/21  1822 04/22/21  2154 04/23/21  0830   POCGLU 61* 82 119 217*       Blood Sugar Average: Last 72 hrs:  (P) 158 [id number]  · Patient reports she takes oral diabetes medications at home, She is not sure which ones or doses  Denies current use of insulin or other injection medications  · Continue SSI  · Added 10u Lantus qhs and 5u with meals  · Hemoglobin A1c suggest patient may need adjustment outpatient regimen  Recommend follow-up with outpatient provider after patient has completed course of steroids  VTE Pharmacologic Prophylaxis:   Pharmacologic: Enoxaparin (Lovenox)  Mechanical VTE Prophylaxis in Place: Yes    Patient Centered Rounds: I have performed bedside rounds with nursing staff today  Discussions with Specialists or Other Care Team Provider: Nursing, CM    Education and Discussions with Family / Patient: Discussed with patient directly at bedside  Appreciate attending discussing with patient's daughter  Time Spent for Care: 30 minutes  More than 50% of total time spent on counseling and coordination of care as described above  Current Length of Stay: 6 day(s)    Current Patient Status: Inpatient   Certification Statement: The patient will continue to require additional inpatient hospital stay due to acute resp failure with hypoxia due to COVID    Discharge Plan: Pending clinical course    Code Status: Level 1 - Full Code      Subjective:   Despite increased O2 requirement patient states she continues to feel better  Occasional dry cough  Denies chest pain/palpitations, nausea/vomiting, abdominal pain  Objective:     Vitals:   Temp (24hrs), Av 4 °F (36 3 °C), Min:97 3 °F (36 3 °C), Max:97 6 °F (36 4 °C)    Temp:  [97 3 °F (36 3 °C)-97 6 °F (36 4 °C)] 97 4 °F (36 3 °C)  HR:  [51-62] 60  Resp:  [18-21] 21  BP: (148-175)/(58-70) 175/64  SpO2:  [88 %-97 %] 91 %  Body mass index is 31 13 kg/m²  Input and Output Summary (last 24 hours):        Intake/Output Summary (Last 24 hours) at 2021 1144  Last data filed at 2021 0955  Gross per 24 hour   Intake 600 ml Output 1400 ml   Net -800 ml       Physical Exam:     Physical Exam  Vitals signs and nursing note reviewed  Constitutional:       Appearance: Normal appearance  Interventions: Nasal cannula in place  Comments: Appears comfortable, no acute distress   HENT:      Head: Normocephalic  Eyes:      General: No scleral icterus  Extraocular Movements: Extraocular movements intact  Conjunctiva/sclera: Conjunctivae normal    Neck:      Musculoskeletal: Normal range of motion  Cardiovascular:      Rate and Rhythm: Normal rate and regular rhythm  Heart sounds: S1 normal and S2 normal    Pulmonary:      Effort: Pulmonary effort is normal       Breath sounds: Normal breath sounds  No wheezing, rhonchi or rales  Abdominal:      General: Bowel sounds are normal       Palpations: Abdomen is soft  Tenderness: There is no abdominal tenderness  There is no guarding or rebound  Musculoskeletal:         General: No swelling, tenderness or deformity  Comments: Able to move upper/lower ext bilaterally, no edema   Skin:     General: Skin is warm and dry  Neurological:      Mental Status: She is alert and oriented to person, place, and time  Psychiatric:         Mood and Affect: Mood normal          Speech: Speech normal          Behavior: Behavior normal            Additional Data:     Labs:    Results from last 7 days   Lab Units 04/23/21  0539 04/22/21  0451  04/17/21  2117   WBC Thousand/uL 4 64 3 79*   < > 5 35   HEMOGLOBIN g/dL 12 6 12 1   < > 13 4   HEMATOCRIT % 37 8 35 8   < > 39 0   PLATELETS Thousands/uL 521* 454*   < > 275   BANDS PCT %  --  1   < >  --    NEUTROS PCT %  --   --   --  80*   LYMPHS PCT %  --   --   --  15   LYMPHO PCT %  --  22   < >  --    MONOS PCT %  --   --   --  5   MONO PCT %  --  4   < >  --    EOS PCT %  --  0   < > 0    < > = values in this interval not displayed       Results from last 7 days   Lab Units 04/23/21  0539   SODIUM mmol/L 138   POTASSIUM mmol/L 4 1   CHLORIDE mmol/L 103   CO2 mmol/L 22   BUN mg/dL 20   CREATININE mg/dL 0 92   ANION GAP mmol/L 13   CALCIUM mg/dL 8 3   ALBUMIN g/dL 2 4*   TOTAL BILIRUBIN mg/dL 0 50   ALK PHOS U/L 63   ALT U/L 49   AST U/L 37   GLUCOSE RANDOM mg/dL 230*     Results from last 7 days   Lab Units 04/17/21 2117   INR  0 99     Results from last 7 days   Lab Units 04/23/21  0830 04/22/21  2154 04/22/21  1822 04/22/21  1748 04/22/21  1723 04/22/21  1156 04/22/21  0814 04/21/21  2114 04/21/21  1655 04/21/21  1153 04/21/21  0800 04/20/21 2054   POC GLUCOSE mg/dl 217* 119 82 61* 49* 241* 202* 177* 82 167* 220* 185*     Results from last 7 days   Lab Units 04/17/21 2117   HEMOGLOBIN A1C % 8 8*     Results from last 7 days   Lab Units 04/22/21  0451 04/21/21  0530 04/18/21  0630 04/17/21 2117   LACTIC ACID mmol/L  --   --   --  1 4   PROCALCITONIN ng/ml <0 05 <0 05 <0 05 <0 05           * I Have Reviewed All Lab Data Listed Above  * Additional Pertinent Lab Tests Reviewed: All Labs Within Last 24 Hours Reviewed    Imaging:    Imaging Reports Reviewed Today Include:   Imaging Personally Reviewed by Myself Includes:      Recent Cultures (last 7 days):     Results from last 7 days   Lab Units 04/17/21 2155 04/17/21 2117   BLOOD CULTURE  No Growth After 5 Days  No Growth After 5 Days         Last 24 Hours Medication List:   Current Facility-Administered Medications   Medication Dose Route Frequency Provider Last Rate    acetaminophen  650 mg Oral Q6H PRN Effie Canales PA-C      ascorbic acid  1,000 mg Oral Q12H [address] Brook Marquez PA-C      atorvastatin  40 mg Oral HS Effie Canales PA-C      benzonatate  100 mg Oral TID PRN Linda Castillo MD      cholecalciferol  2,000 Units Oral Daily Effie Canales PA-C      cilostazol  50 mg Oral BID Linda Castillo MD      dexamethasone  6 mg Intravenous Q24H Effie Canales PA-C      enoxaparin  30 mg Subcutaneous Q12H [address] Brook Marquez PA-C      famotidine  20 mg Oral Daily Brook Veronica Green PA-C      guaiFENesin  600 mg Oral Q12H [address] Tod Mojica MD      insulin glargine  10 Units Subcutaneous HS Anastasia Mancuso MD      insulin lispro  1-6 Units Subcutaneous HS Cinthya PÉREZ PA-C      insulin lispro  2-12 Units Subcutaneous TID AC Cinthya PÉREZ PA-C      insulin lispro  5 Units Subcutaneous TID With Meals Anastasia Mancuso MD      lisinopril  10 mg Oral Daily [facility] VDINH      metoprolol tartrate  25 mg Oral Q12H [facility], MD      zinc sulfate  220 mg Oral Daily Brook Marquez PA-C      Followed by   Arline Forbes ON 4/25/2021] multivitamin-minerals  1 tablet Oral Daily Erika Chan PA-C      ondansetron  4 mg Intravenous Q6H PRN Erika Chan PA-C          Today, Patient Was Seen By: Michael Holder PA-C    ** Please Note: Dictation voice to text software may have been used in the creation of this document   **

## 2021-04-23 NOTE — OCCUPATIONAL THERAPY NOTE
Occupational Therapy Cancellation Note        Patient Name: Faraz Lamb  [id number] Date: 4/23/2021 04/23/21 1601   OT Last Visit   OT Visit Date 04/23/21   Note Type   Cancel Reasons Medical status   Assessment   Assessment Multiple attempts made to see pt for OT evaluation  Per MAIRA Weathers, pt currently on 8L O2 and quickly desaturates to ~85% with minimal activity  Therefore, pt not appropriate for OT eval at this time  Will D/C orders  Please reconsult once pt is medically appropriate to participate with therapy           Noman Billings OTR/L

## 2021-04-24 LAB
ALBUMIN SERPL BCP-MCNC: 2.3 G/DL (ref 3.5–5)
ALP SERPL-CCNC: 60 U/L (ref 46–116)
ALT SERPL W P-5'-P-CCNC: 52 U/L (ref 12–78)
ANION GAP SERPL CALCULATED.3IONS-SCNC: 8 MMOL/L (ref 4–13)
AST SERPL W P-5'-P-CCNC: 37 U/L (ref 5–45)
BILIRUB SERPL-MCNC: 0.6 MG/DL (ref 0.2–1)
BUN SERPL-MCNC: 20 MG/DL (ref 5–25)
CALCIUM ALBUM COR SERPL-MCNC: 9.6 MG/DL (ref 8.3–10.1)
CALCIUM SERPL-MCNC: 8.2 MG/DL (ref 8.3–10.1)
CHLORIDE SERPL-SCNC: 105 MMOL/L (ref 100–108)
CO2 SERPL-SCNC: 28 MMOL/L (ref 21–32)
CREAT SERPL-MCNC: 0.78 MG/DL (ref 0.6–1.3)
ERYTHROCYTE [DISTWIDTH] IN BLOOD BY AUTOMATED COUNT: 12.9 % (ref 11.6–15.1)
GFR SERPL CREATININE-BSD FRML MDRD: 71 ML/MIN/1.73SQ M
GLUCOSE SERPL-MCNC: 187 MG/DL (ref 65–140)
GLUCOSE SERPL-MCNC: 211 MG/DL (ref 65–140)
GLUCOSE SERPL-MCNC: 213 MG/DL (ref 65–140)
GLUCOSE SERPL-MCNC: 260 MG/DL (ref 65–140)
GLUCOSE SERPL-MCNC: 78 MG/DL (ref 65–140)
HCT VFR BLD AUTO: 36.9 % (ref 34.8–46.1)
HGB BLD-MCNC: 12.2 G/DL (ref 11.5–15.4)
MCH RBC QN AUTO: 29.6 PG (ref 26.8–34.3)
MCHC RBC AUTO-ENTMCNC: 33.1 G/DL (ref 31.4–37.4)
MCV RBC AUTO: 90 FL (ref 82–98)
NRBC BLD AUTO-RTO: 0 /100 WBCS
PLATELET # BLD AUTO: 521 THOUSANDS/UL (ref 149–390)
PMV BLD AUTO: 9.3 FL (ref 8.9–12.7)
POTASSIUM SERPL-SCNC: 4.1 MMOL/L (ref 3.5–5.3)
PROT SERPL-MCNC: 5.3 G/DL (ref 6.4–8.2)
RBC # BLD AUTO: 4.12 MILLION/UL (ref 3.81–5.12)
SODIUM SERPL-SCNC: 141 MMOL/L (ref 136–145)
WBC # BLD AUTO: 4.97 THOUSAND/UL (ref 4.31–10.16)

## 2021-04-24 PROCEDURE — 94760 N-INVAS EAR/PLS OXIMETRY 1: CPT

## 2021-04-24 PROCEDURE — 85027 COMPLETE CBC AUTOMATED: CPT | Performed by: PHYSICIAN ASSISTANT

## 2021-04-24 PROCEDURE — 99232 SBSQ HOSP IP/OBS MODERATE 35: CPT | Performed by: INTERNAL MEDICINE

## 2021-04-24 PROCEDURE — 82948 REAGENT STRIP/BLOOD GLUCOSE: CPT

## 2021-04-24 PROCEDURE — 80053 COMPREHEN METABOLIC PANEL: CPT | Performed by: PHYSICIAN ASSISTANT

## 2021-04-24 RX ORDER — DOCUSATE SODIUM 100 MG/1
100 CAPSULE, LIQUID FILLED ORAL 2 TIMES DAILY
Status: DISCONTINUED | OUTPATIENT
Start: 2021-04-24 | End: 2021-04-26

## 2021-04-24 RX ADMIN — INSULIN LISPRO 2 UNITS: 100 INJECTION, SOLUTION INTRAVENOUS; SUBCUTANEOUS at 09:31

## 2021-04-24 RX ADMIN — METOPROLOL TARTRATE 25 MG: 25 TABLET, FILM COATED ORAL at 09:37

## 2021-04-24 RX ADMIN — INSULIN LISPRO 4 UNITS: 100 INJECTION, SOLUTION INTRAVENOUS; SUBCUTANEOUS at 17:11

## 2021-04-24 RX ADMIN — DOCUSATE SODIUM 100 MG: 100 CAPSULE, LIQUID FILLED ORAL at 13:00

## 2021-04-24 RX ADMIN — GUAIFENESIN 600 MG: 600 TABLET ORAL at 09:38

## 2021-04-24 RX ADMIN — GUAIFENESIN 600 MG: 600 TABLET ORAL at 21:05

## 2021-04-24 RX ADMIN — OXYCODONE HYDROCHLORIDE AND ACETAMINOPHEN 1000 MG: 500 TABLET ORAL at 09:37

## 2021-04-24 RX ADMIN — CILOSTAZOL 50 MG: 50 TABLET ORAL at 17:11

## 2021-04-24 RX ADMIN — LISINOPRIL 10 MG: 10 TABLET ORAL at 09:37

## 2021-04-24 RX ADMIN — INSULIN LISPRO 6 UNITS: 100 INJECTION, SOLUTION INTRAVENOUS; SUBCUTANEOUS at 13:00

## 2021-04-24 RX ADMIN — ENOXAPARIN SODIUM 30 MG: 30 INJECTION SUBCUTANEOUS at 21:04

## 2021-04-24 RX ADMIN — FUROSEMIDE 40 MG: 10 INJECTION, SOLUTION INTRAMUSCULAR; INTRAVENOUS at 09:38

## 2021-04-24 RX ADMIN — Medication 2000 UNITS: at 09:38

## 2021-04-24 RX ADMIN — CILOSTAZOL 50 MG: 50 TABLET ORAL at 09:38

## 2021-04-24 RX ADMIN — DEXAMETHASONE SODIUM PHOSPHATE 6 MG: 4 INJECTION, SOLUTION INTRA-ARTICULAR; INTRALESIONAL; INTRAMUSCULAR; INTRAVENOUS; SOFT TISSUE at 21:04

## 2021-04-24 RX ADMIN — FAMOTIDINE 20 MG: 20 TABLET ORAL at 09:38

## 2021-04-24 RX ADMIN — METOPROLOL TARTRATE 25 MG: 25 TABLET, FILM COATED ORAL at 21:05

## 2021-04-24 RX ADMIN — ZINC SULFATE 220 MG (50 MG) CAPSULE 220 MG: CAPSULE at 09:37

## 2021-04-24 RX ADMIN — ENOXAPARIN SODIUM 30 MG: 30 INJECTION SUBCUTANEOUS at 09:33

## 2021-04-24 RX ADMIN — ATORVASTATIN CALCIUM 40 MG: 40 TABLET, FILM COATED ORAL at 21:05

## 2021-04-24 NOTE — PROGRESS NOTES
[facility]       Progress Note - Odette Browning 1940, 79 y o  female MRN: [id number]  Unit/Bed#: -Ruben Encounter: [id number]  Primary Care Provider: Ashley Nieves MD   Date and time admitted to hospital: 4/17/2021  8:22 PM    * Pneumonia due to COVID-19 virus  Assessment & Plan  · Came in due to generalized fatigue over the past week  Patient had a COVID test outpatient but has been waiting for the results  · COVID positive in the ED  · 84% on room air, initially placed on 2 L, escalating O2 requirements currently on 9L midflow  Attempt at decreasing O2 to 2 L yesterday patient desat with minimal activity  ·   Ordered Actemra 4/19  · Cardiac markers   · Troponin negative  · CK normal 41  · BNP 1805  · Inflammatory markers   · CRP:  157 5 > 34 7  · D-dimer elevated at 1 69 > 0 50  · Ferritin 308 > pending  · In the ED patient received ceftriaxone, doxycycline and Decadron  · Continue vitamin cocktail, Pepcid  · Completed remdesivir  · Continue patient on atorvastatin 40 mg daily  · CXR 4/21 reveals slight progression of pneumonia, repeat 4/22 overall stable  · Repeat procalcitonin normal  · Also recommended for IV diuresis per pulm previously, good response to Lasix  · Appreciate ongoing pulmonology recommendations  · Able to weaned to 6 L mid flow this morning, SpO2 96%    Continue to wean as tolerated    Hypertension  Assessment & Plan  · Pressures have been elevated during admission  · Continue 10mg lisinopril and 25mg metoprolol BID    Acute respiratory failure with hypoxia (HCC)  Assessment & Plan  · In the setting of COVID-19  · Able to weaned to 6 L mid flow this morning, SpO2 96%  · Wean as able to maintain SpO2 > 90%    Type 2 diabetes mellitus, without long-term current use of insulin [facility])  Assessment & Plan  Lab Results   Component Value Date    HGBA1C 8 8 (H) 04/17/2021       Recent Labs     04/23/21  1202 04/23/21  1628 04/23/21  2118 04/24/21  0911   POCGLU 190* 153* 119 187*       Blood Sugar Average: Last 72 hrs:  (P) 038 [id number]  · Patient reports she takes oral diabetes medications at home, She is not sure which ones or doses  Denies current use of insulin or other injection medications  · Continue SSI  · Added 10u Lantus qhs,  had episode of hypoglycemia and Humalog decreased to 3 units t i d     VTE Pharmacologic Prophylaxis:   Pharmacologic: Enoxaparin (Lovenox)  Mechanical VTE Prophylaxis in Place: Yes    Patient Centered Rounds: I have performed bedside rounds with nursing staff today  Discussions with Specialists or Other Care Team Provider:  Nursing    Education and Discussions with Family / Patient:  Discussed with patient directly at bedside  Attempted to call patient's daughter, no answer, voicemail left  Time Spent for Care: 20 minutes  More than 50% of total time spent on counseling and coordination of care as described above  Current Length of Stay: 7 day(s)    Current Patient Status: Inpatient   Certification Statement: The patient will continue to require additional inpatient hospital stay due to Acute respiratory failure with hypoxia secondary to COVID    Discharge Plan:  Pending clinical course, eventual PT/OT eval pending respiratory status    Code Status: Level 1 - Full Code      Subjective:   Patient states she feels even better this morning  Reports that she wants to try to get out of bed  Minimal cough  Denies chest pain/palpitations, nausea/vomiting, abdominal pain  Shortness of breath is improving  Good appetite    Objective:     Vitals:   Temp (24hrs), Av 7 °F (36 5 °C), Min:97 4 °F (36 3 °C), Max:98 2 °F (36 8 °C)    Temp:  [97 4 °F (36 3 °C)-98 2 °F (36 8 °C)] 97 6 °F (36 4 °C)  HR:  [49-62] 62  Resp:  [18-21] 18  BP: (162-172)/(61-64) 162/61  SpO2:  [90 %-95 %] 95 %  Body mass index is 31 13 kg/m²  Input and Output Summary (last 24 hours):        Intake/Output Summary (Last 24 hours) at 2021 [facility] filed at 4/24/2021 0936  Gross per 24 hour   Intake 240 ml   Output 900 ml   Net -660 ml       Physical Exam:     Physical Exam  Vitals signs and nursing note reviewed  Constitutional:       Appearance: Normal appearance  Interventions: Nasal cannula in place  Comments: Appears comfortable, no acute distress   HENT:      Head: Normocephalic  Eyes:      General: No scleral icterus  Extraocular Movements: Extraocular movements intact  Conjunctiva/sclera: Conjunctivae normal    Neck:      Musculoskeletal: Normal range of motion  Cardiovascular:      Rate and Rhythm: Normal rate and regular rhythm  Heart sounds: S1 normal and S2 normal    Pulmonary:      Effort: Pulmonary effort is normal       Breath sounds: Normal breath sounds  No wheezing, rhonchi or rales  Abdominal:      General: Bowel sounds are normal       Palpations: Abdomen is soft  Tenderness: There is no abdominal tenderness  There is no guarding or rebound  Musculoskeletal:         General: No swelling, tenderness or deformity  Comments: Able to move upper/lower extremities bilaterally without difficulty, no edema   Skin:     General: Skin is warm and dry  Neurological:      Mental Status: She is alert and oriented to person, place, and time     Psychiatric:         Mood and Affect: Mood normal          Speech: Speech normal          Behavior: Behavior normal            Additional Data:     Labs:    Results from last 7 days   Lab Units 04/24/21  0458  04/22/21  0451  04/17/21  2117   WBC Thousand/uL 4 97   < > 3 79*   < > 5 35   HEMOGLOBIN g/dL 12 2   < > 12 1   < > 13 4   HEMATOCRIT % 36 9   < > 35 8   < > 39 0   PLATELETS Thousands/uL 521*   < > 454*   < > 275   BANDS PCT %  --   --  1   < >  --    NEUTROS PCT %  --   --   --   --  80*   LYMPHS PCT %  --   --   --   --  15   LYMPHO PCT %  --   --  22   < >  --    MONOS PCT %  --   --   --   --  5   MONO PCT %  --   --  4   < >  --    EOS PCT % --   --  0   < > 0    < > = values in this interval not displayed  Results from last 7 days   Lab Units 04/24/21  0458   SODIUM mmol/L 141   POTASSIUM mmol/L 4 1   CHLORIDE mmol/L 105   CO2 mmol/L 28   BUN mg/dL 20   CREATININE mg/dL 0 78   ANION GAP mmol/L 8   CALCIUM mg/dL 8 2*   ALBUMIN g/dL 2 3*   TOTAL BILIRUBIN mg/dL 0 60   ALK PHOS U/L 60   ALT U/L 52   AST U/L 37   GLUCOSE RANDOM mg/dL 211*     Results from last 7 days   Lab Units 04/17/21 2117   INR  0 99     Results from last 7 days   Lab Units 04/24/21  0911 04/23/21  2118 04/23/21  1628 04/23/21  1202 04/23/21  0830 04/22/21  2154 04/22/21  1822 04/22/21  1748 04/22/21  1723 04/22/21  1156 04/22/21  0814 04/21/21 2114   POC GLUCOSE mg/dl 187* 119 153* 190* 217* 119 82 61* 49* 241* 202* 177*     Results from last 7 days   Lab Units 04/17/21 2117   HEMOGLOBIN A1C % 8 8*     Results from last 7 days   Lab Units 04/22/21  0451 04/21/21  0530 04/18/21  0630 04/17/21 2117   LACTIC ACID mmol/L  --   --   --  1 4   PROCALCITONIN ng/ml <0 05 <0 05 <0 05 <0 05           * I Have Reviewed All Lab Data Listed Above  * Additional Pertinent Lab Tests Reviewed: All Labs Within Last 24 Hours Reviewed    Imaging:    Imaging Reports Reviewed Today Include:   Imaging Personally Reviewed by Myself Includes:      Recent Cultures (last 7 days):     Results from last 7 days   Lab Units 04/17/21 2155 04/17/21 2117   BLOOD CULTURE  No Growth After 5 Days  No Growth After 5 Days         Last 24 Hours Medication List:   Current Facility-Administered Medications   Medication Dose Route Frequency Provider Last Rate    acetaminophen  650 mg Oral Q6H PRN Fozia Perez PA-C      atorvastatin  40 mg Oral HS Fozia Perez PA-C      benzonatate  100 mg Oral TID PRN Derik Smith MD      cholecalciferol  2,000 Units Oral Daily Marlynn Coon, PA-C      cilostazol  50 mg Oral BID Derik Smith MD      dexamethasone  6 mg Intravenous Q24H Fozia Perez PA-C      enoxaparin  30 mg Subcutaneous Q12H [address] Sofie Case PA-C      famotidine  20 mg Oral Daily Sofie Case PA-C      furosemide  40 mg Intravenous Daily Chris Jeffrey MD      guaiFENesin  600 mg Oral Q12H [address] Haile Ramirez MD      insulin glargine  10 Units Subcutaneous HS Chris Jeffrey MD      insulin lispro  1-6 Units Subcutaneous HS Cinthya PÉREZ PA-C      insulin lispro  2-12 Units Subcutaneous TID AC Cinthya PÉREZ PA-C      insulin lispro  3 Units Subcutaneous TID With Meals Tremayne Villarreal PA-C      lisinopril  10 mg Oral Daily [address] DINH PÉREZ      metoprolol tartrate  25 mg Oral Q12H Pedro Matos MD      [START ON 4/25/2021] multivitamin-minerals  1 tablet Oral Daily Sofie Case PA-C      ondansetron  4 mg Intravenous Q6H PRN Sofie Case PA-C          Today, Patient Was Seen By: Tremayne Villarreal PA-C    ** Please Note: Dictation voice to text software may have been used in the creation of this document   **

## 2021-04-24 NOTE — ASSESSMENT & PLAN NOTE
Lab Results   Component Value Date    HGBA1C 8 8 (H) 04/17/2021       Recent Labs     04/23/21  1202 04/23/21  1628 04/23/21  2118 04/24/21  0911   POCGLU 190* 153* 119 187*       Blood Sugar Average: Last 72 hrs:  (P) 681 [id number]  · Patient reports she takes oral diabetes medications at home, She is not sure which ones or doses  Denies current use of insulin or other injection medications     · Continue SSI  · Added 10u Lantus qhs, 4/23 had episode of hypoglycemia and Humalog decreased to 3 units t i d

## 2021-04-24 NOTE — PLAN OF CARE
Problem: Potential for Falls  Goal: Patient will remain free of falls  Description: INTERVENTIONS:  - Assess patient frequently for physical needs  -  Identify cognitive and physical deficits and behaviors that affect risk of falls  -  [place] fall precautions as indicated by assessment   - Educate patient/family on patient safety including physical limitations  - Instruct patient to call for assistance with activity based on assessment  - Modify environment to reduce risk of injury  - Consider OT/PT consult to assist with strengthening/mobility  Outcome: Progressing     Problem: Nutrition/Hydration-ADULT  Goal: Nutrient/Hydration intake appropriate for improving, restoring or maintaining nutritional needs  Description: Monitor and assess patient's nutrition/hydration status for malnutrition  Collaborate with interdisciplinary team and initiate plan and interventions as ordered  Monitor patient's weight and dietary intake as ordered or per policy  Utilize nutrition screening tool and intervene as necessary  Determine patient's food preferences and provide high-protein, high-caloric foods as appropriate       INTERVENTIONS:  - Monitor oral intake, urinary output, labs, and treatment plans  - Assess nutrition and hydration status and recommend course of action  - Evaluate amount of meals eaten  - Assist patient with eating if necessary   - Allow adequate time for meals  - Recommend/ encourage appropriate diets, oral nutritional supplements, and vitamin/mineral supplements  - Order, calculate, and assess calorie counts as needed  - Recommend, monitor, and adjust tube feedings and TPN/PPN based on assessed needs  - Assess need for intravenous fluids  - Provide specific nutrition/hydration education as appropriate  - Include patient/family/caregiver in decisions related to nutrition  Outcome: Progressing     Problem: PAIN - ADULT  Goal: Verbalizes/displays adequate comfort level or baseline comfort level  Description: Interventions:  - Encourage patient to monitor pain and request assistance  - Assess pain using appropriate pain scale  - Administer analgesics based on type and severity of pain and evaluate response  - Implement non-pharmacological measures as appropriate and evaluate response  - Consider cultural and social influences on pain and pain management  - Notify physician/advanced practitioner if interventions unsuccessful or patient reports new pain  Outcome: Progressing     Problem: INFECTION - ADULT  Goal: Absence or prevention of progression during hospitalization  Description: INTERVENTIONS:  - Assess and monitor for signs and symptoms of infection  - Monitor lab/diagnostic results  - Monitor all insertion sites, i e  indwelling lines, tubes, and drains  - Monitor endotracheal if appropriate and nasal secretions for changes in amount and color  - [place] appropriate cooling/warming therapies per order  - Administer medications as ordered  - Instruct and encourage patient and family to use good hand hygiene technique  - Identify and instruct in appropriate isolation precautions for identified infection/condition  Outcome: Progressing     Problem: SAFETY ADULT  Goal: Patient will remain free of falls  Description: INTERVENTIONS:  - Assess patient frequently for physical needs  -  Identify cognitive and physical deficits and behaviors that affect risk of falls    -  [place] fall precautions as indicated by assessment   - Educate patient/family on patient safety including physical limitations  - Instruct patient to call for assistance with activity based on assessment  - Modify environment to reduce risk of injury  - Consider OT/PT consult to assist with strengthening/mobility  Outcome: Progressing  Goal: Maintain or return to baseline ADL function  Description: INTERVENTIONS:  -  Assess patient's ability to carry out ADLs; assess patient's baseline for ADL function and identify physical deficits which impact ability to perform ADLs (bathing, care of mouth/teeth, toileting, grooming, dressing, etc )  - Assess/evaluate cause of self-care deficits   - Assess range of motion  - Assess patient's mobility; develop plan if impaired  - Assess patient's need for assistive devices and provide as appropriate  - Encourage maximum independence but intervene and supervise when necessary  - Involve family in performance of ADLs  - Assess for home care needs following discharge   - Consider OT consult to assist with ADL evaluation and planning for discharge  - Provide patient education as appropriate  Outcome: Progressing  Goal: Maintain or return mobility status to optimal level  Description: INTERVENTIONS:  - Assess patient's baseline mobility status (ambulation, transfers, stairs, etc )    - Identify cognitive and physical deficits and behaviors that affect mobility  - Identify mobility aids required to assist with transfers and/or ambulation (gait belt, sit-to-stand, lift, walker, cane, etc )  - [place] fall precautions as indicated by assessment  - Record patient progress and toleration of activity level on Mobility SBAR; progress patient to next Phase/Stage  - Instruct patient to call for assistance with activity based on assessment  - Consider rehabilitation consult to assist with strengthening/weightbearing, etc   Outcome: Progressing     Problem: DISCHARGE PLANNING  Goal: Discharge to home or other facility with appropriate resources  Description: INTERVENTIONS:  - Identify barriers to discharge w/patient and caregiver  - Arrange for needed discharge resources and transportation as appropriate  - Identify discharge learning needs (meds, wound care, etc )  - Arrange for interpretive services to assist at discharge as needed  - Refer to Case Management Department for coordinating discharge planning if the patient needs post-hospital services based on physician/advanced practitioner order or complex needs related to functional status, cognitive ability, or social support system  Outcome: Progressing     Problem: Knowledge Deficit  Goal: Patient/family/caregiver demonstrates understanding of disease process, treatment plan, medications, and discharge instructions  Description: Complete learning assessment and assess knowledge base    Interventions:  - Provide teaching at level of understanding  - Provide teaching via preferred learning methods  Outcome: Progressing     Problem: RESPIRATORY - ADULT  Goal: Achieves optimal ventilation and oxygenation  Description: INTERVENTIONS:  - Assess for changes in respiratory status  - Assess for changes in mentation and behavior  - Position to facilitate oxygenation and minimize respiratory effort  - Oxygen administered by appropriate delivery if ordered  - Initiate smoking cessation education as indicated  - Encourage broncho-pulmonary hygiene including cough, deep breathe, Incentive Spirometry  - Assess the need for suctioning and aspirate as needed  - Assess and instruct to report SOB or any respiratory difficulty  - Respiratory Therapy support as indicated  Outcome: Progressing     Problem: MUSCULOSKELETAL - ADULT  Goal: Maintain or return mobility to safest level of function  Description: INTERVENTIONS:  - Assess patient's ability to carry out ADLs; assess patient's baseline for ADL function and identify physical deficits which impact ability to perform ADLs (bathing, care of mouth/teeth, toileting, grooming, dressing, etc )  - Assess/evaluate cause of self-care deficits   - Assess range of motion  - Assess patient's mobility  - Assess patient's need for assistive devices and provide as appropriate  - Encourage maximum independence but intervene and supervise when necessary  - Involve family in performance of ADLs  - Assess for home care needs following discharge   - Consider OT consult to assist with ADL evaluation and planning for discharge  - Provide patient education as appropriate  Outcome: Progressing     Problem: Prexisting or High Potential for Compromised Skin Integrity  Goal: Skin integrity is maintained or improved  Description: INTERVENTIONS:  - Identify patients at risk for skin breakdown  - Assess and monitor skin integrity  - Assess and monitor nutrition and hydration status  - Monitor labs   - Assess for incontinence   - Turn and reposition patient  - Assist with mobility/ambulation  - Relieve pressure over bony prominences  - Avoid friction and shearing  - Provide appropriate hygiene as needed including keeping skin clean and dry  - Evaluate need for skin moisturizer/barrier cream  - Collaborate with interdisciplinary team   - Patient/family teaching  - Consider wound care consult   Outcome: Progressing

## 2021-04-24 NOTE — ASSESSMENT & PLAN NOTE
· In the setting of COVID-19  · Able to weaned to 6 L mid flow this morning, SpO2 96%  · Wean as able to maintain SpO2 > 90%

## 2021-04-24 NOTE — ASSESSMENT & PLAN NOTE
· Came in due to generalized fatigue over the past week  Patient had a COVID test outpatient but has been waiting for the results  · COVID positive in the ED  · 84% on room air, initially placed on 2 L, escalating O2 requirements currently on 9L midflow  Attempt at decreasing O2 to 2 L yesterday patient desat with minimal activity  ·   Ordered Actemra 4/19  · Cardiac markers   · Troponin negative  · CK normal 41  · BNP 1805  · Inflammatory markers   · CRP:  157 5 > 34 7  · D-dimer elevated at 1 69 > 0 50  · Ferritin 308 > pending  · In the ED patient received ceftriaxone, doxycycline and Decadron  · Continue vitamin cocktail, Pepcid  · Completed remdesivir  · Continue patient on atorvastatin 40 mg daily  · CXR 4/21 reveals slight progression of pneumonia, repeat 4/22 overall stable  · Repeat procalcitonin normal  · Also recommended for IV diuresis per pulm previously, good response to Lasix  · Appreciate ongoing pulmonology recommendations  · Able to weaned to 6 L mid flow this morning, SpO2 96%    Continue to wean as tolerated

## 2021-04-25 LAB
ALBUMIN SERPL BCP-MCNC: 2.2 G/DL (ref 3.5–5)
ALP SERPL-CCNC: 56 U/L (ref 46–116)
ALT SERPL W P-5'-P-CCNC: 56 U/L (ref 12–78)
ANION GAP SERPL CALCULATED.3IONS-SCNC: 10 MMOL/L (ref 4–13)
AST SERPL W P-5'-P-CCNC: 32 U/L (ref 5–45)
BASOPHILS # BLD MANUAL: 0 THOUSAND/UL (ref 0–0.1)
BASOPHILS NFR MAR MANUAL: 0 % (ref 0–1)
BILIRUB SERPL-MCNC: 0.5 MG/DL (ref 0.2–1)
BUN SERPL-MCNC: 24 MG/DL (ref 5–25)
CALCIUM ALBUM COR SERPL-MCNC: 9.9 MG/DL (ref 8.3–10.1)
CALCIUM SERPL-MCNC: 8.5 MG/DL (ref 8.3–10.1)
CHLORIDE SERPL-SCNC: 102 MMOL/L (ref 100–108)
CO2 SERPL-SCNC: 26 MMOL/L (ref 21–32)
CREAT SERPL-MCNC: 0.91 MG/DL (ref 0.6–1.3)
CRP SERPL QL: 7.7 MG/L
D DIMER PPP FEU-MCNC: 0.87 UG/ML FEU
EOSINOPHIL # BLD MANUAL: 0 THOUSAND/UL (ref 0–0.4)
EOSINOPHIL NFR BLD MANUAL: 0 % (ref 0–6)
ERYTHROCYTE [DISTWIDTH] IN BLOOD BY AUTOMATED COUNT: 13.1 % (ref 11.6–15.1)
GFR SERPL CREATININE-BSD FRML MDRD: 59 ML/MIN/1.73SQ M
GLUCOSE SERPL-MCNC: 120 MG/DL (ref 65–140)
GLUCOSE SERPL-MCNC: 120 MG/DL (ref 65–140)
GLUCOSE SERPL-MCNC: 249 MG/DL (ref 65–140)
GLUCOSE SERPL-MCNC: 279 MG/DL (ref 65–140)
GLUCOSE SERPL-MCNC: 316 MG/DL (ref 65–140)
HCT VFR BLD AUTO: 36 % (ref 34.8–46.1)
HGB BLD-MCNC: 11.8 G/DL (ref 11.5–15.4)
LYMPHOCYTES # BLD AUTO: 0.34 THOUSAND/UL (ref 0.6–4.47)
LYMPHOCYTES # BLD AUTO: 7 % (ref 14–44)
MCH RBC QN AUTO: 29.4 PG (ref 26.8–34.3)
MCHC RBC AUTO-ENTMCNC: 32.8 G/DL (ref 31.4–37.4)
MCV RBC AUTO: 90 FL (ref 82–98)
MONOCYTES # BLD AUTO: 0 THOUSAND/UL (ref 0–1.22)
MONOCYTES NFR BLD: 0 % (ref 4–12)
NEUTROPHILS # BLD MANUAL: 4.5 THOUSAND/UL (ref 1.85–7.62)
NEUTS SEG NFR BLD AUTO: 93 % (ref 43–75)
NRBC BLD AUTO-RTO: 0 /100 WBCS
PLATELET # BLD AUTO: 498 THOUSANDS/UL (ref 149–390)
PLATELET BLD QL SMEAR: ABNORMAL
PMV BLD AUTO: 9.2 FL (ref 8.9–12.7)
POTASSIUM SERPL-SCNC: 4 MMOL/L (ref 3.5–5.3)
PROT SERPL-MCNC: 5.2 G/DL (ref 6.4–8.2)
RBC # BLD AUTO: 4.02 MILLION/UL (ref 3.81–5.12)
SODIUM SERPL-SCNC: 138 MMOL/L (ref 136–145)
TOTAL CELLS COUNTED SPEC: 100
WBC # BLD AUTO: 4.84 THOUSAND/UL (ref 4.31–10.16)

## 2021-04-25 PROCEDURE — 85007 BL SMEAR W/DIFF WBC COUNT: CPT | Performed by: PHYSICIAN ASSISTANT

## 2021-04-25 PROCEDURE — 85379 FIBRIN DEGRADATION QUANT: CPT | Performed by: PHYSICIAN ASSISTANT

## 2021-04-25 PROCEDURE — 85027 COMPLETE CBC AUTOMATED: CPT | Performed by: PHYSICIAN ASSISTANT

## 2021-04-25 PROCEDURE — 94760 N-INVAS EAR/PLS OXIMETRY 1: CPT

## 2021-04-25 PROCEDURE — 80053 COMPREHEN METABOLIC PANEL: CPT | Performed by: PHYSICIAN ASSISTANT

## 2021-04-25 PROCEDURE — 82948 REAGENT STRIP/BLOOD GLUCOSE: CPT

## 2021-04-25 PROCEDURE — 86140 C-REACTIVE PROTEIN: CPT | Performed by: PHYSICIAN ASSISTANT

## 2021-04-25 PROCEDURE — 99232 SBSQ HOSP IP/OBS MODERATE 35: CPT | Performed by: INTERNAL MEDICINE

## 2021-04-25 RX ADMIN — ENOXAPARIN SODIUM 30 MG: 30 INJECTION SUBCUTANEOUS at 21:13

## 2021-04-25 RX ADMIN — DEXAMETHASONE SODIUM PHOSPHATE 6 MG: 4 INJECTION, SOLUTION INTRA-ARTICULAR; INTRALESIONAL; INTRAMUSCULAR; INTRAVENOUS; SOFT TISSUE at 21:13

## 2021-04-25 RX ADMIN — FUROSEMIDE 40 MG: 10 INJECTION, SOLUTION INTRAMUSCULAR; INTRAVENOUS at 08:56

## 2021-04-25 RX ADMIN — Medication 2000 UNITS: at 08:55

## 2021-04-25 RX ADMIN — INSULIN GLARGINE 10 UNITS: 100 INJECTION, SOLUTION SUBCUTANEOUS at 21:13

## 2021-04-25 RX ADMIN — FAMOTIDINE 20 MG: 20 TABLET ORAL at 08:56

## 2021-04-25 RX ADMIN — INSULIN LISPRO 4 UNITS: 100 INJECTION, SOLUTION INTRAVENOUS; SUBCUTANEOUS at 08:58

## 2021-04-25 RX ADMIN — GUAIFENESIN 600 MG: 600 TABLET ORAL at 21:12

## 2021-04-25 RX ADMIN — CILOSTAZOL 50 MG: 50 TABLET ORAL at 08:56

## 2021-04-25 RX ADMIN — METOPROLOL TARTRATE 25 MG: 25 TABLET, FILM COATED ORAL at 08:55

## 2021-04-25 RX ADMIN — Medication 1 TABLET: at 08:55

## 2021-04-25 RX ADMIN — GUAIFENESIN 600 MG: 600 TABLET ORAL at 08:55

## 2021-04-25 RX ADMIN — ENOXAPARIN SODIUM 30 MG: 30 INJECTION SUBCUTANEOUS at 08:55

## 2021-04-25 RX ADMIN — METOPROLOL TARTRATE 25 MG: 25 TABLET, FILM COATED ORAL at 21:13

## 2021-04-25 RX ADMIN — INSULIN LISPRO 8 UNITS: 100 INJECTION, SOLUTION INTRAVENOUS; SUBCUTANEOUS at 12:44

## 2021-04-25 RX ADMIN — CILOSTAZOL 50 MG: 50 TABLET ORAL at 17:23

## 2021-04-25 RX ADMIN — ATORVASTATIN CALCIUM 40 MG: 40 TABLET, FILM COATED ORAL at 21:13

## 2021-04-25 RX ADMIN — LISINOPRIL 10 MG: 10 TABLET ORAL at 08:55

## 2021-04-25 NOTE — ASSESSMENT & PLAN NOTE
Lab Results   Component Value Date    HGBA1C 8 8 (H) 04/17/2021       Recent Labs     04/24/21  1241 04/24/21  1547 04/24/21  2107 04/25/21  0746   POCGLU 260* 213* 78 249*       Blood Sugar Average: Last 72 hrs:  (P) 797 [id number]  · Patient reports she takes oral diabetes medications at home, She is not sure which ones or doses  Denies current use of insulin or other injection medications  · Continue SSI  · Added 10u Lantus qhs, 4/23 had episode of hypoglycemia and Humalog decreased to 3 units t i d   Now with persistent hyperglycemia again will increase to 5 units t i d

## 2021-04-25 NOTE — PROGRESS NOTES
[place]     Progress Note - Blanca Mckeon 1940, 79 y o  female MRN: [id number]  Unit/Bed#: -[facility] Encounter: [id number]  Primary Care Provider: Courtney Vaelra MD   Date and time admitted to hospital: 4/17/2021  8:22 PM    * Pneumonia due to COVID-19 virus  Assessment & Plan  · Came in due to generalized fatigue over the past week  Patient had a COVID test outpatient but has been waiting for the results  · COVID positive in the ED  · 84% on room air, initially placed on 2 L, escalating O2 requirements currently on 9L midflow  Attempt at decreasing O2 to 2 L yesterday patient desat with minimal activity  ·   Ordered Actemra 4/19  · Cardiac markers   · Troponin negative  · CK normal 41  · BNP 1805  · Inflammatory markers   · CRP:  157 5 > 34 7  · D-dimer elevated at 1 69 > 0 50  · Ferritin 308 > pending  · In the ED patient received ceftriaxone, doxycycline and Decadron  · Continue vitamin cocktail, Pepcid  · Completed remdesivir  · Continue patient on atorvastatin 40 mg daily  · CXR 4/21 reveals slight progression of pneumonia, repeat 4/22 overall stable  · Repeat procalcitonin normal  · Also recommended for IV diuresis per pulm previously, good response to Lasix  · Appreciate ongoing pulmonology recommendations  · Able to be weaned to 6 L nasal cannula    Continue to wean as tolerated    Hypertension  Assessment & Plan  · Pressures have been elevated during admission  · Continue 10mg lisinopril and 25mg metoprolol BID    Acute respiratory failure with hypoxia (HCC)  Assessment & Plan  · In the setting of COVID-19  · Now on 6 L nasal cannula, SpO2 97%  · Wean as able to maintain SpO2 > 90%    Type 2 diabetes mellitus, without long-term current use of insulin [facility])  Assessment & Plan  Lab Results   Component Value Date    HGBA1C 8 8 (H) 04/17/2021       Recent Labs     04/24/21  1241 04/24/21  1547 04/24/21  2107 04/25/21  0746   POCGLU 260* 213* 78 249*       Blood Sugar Average: Last 72 hrs:  (P) 381 [id number]  · Patient reports she takes oral diabetes medications at home, She is not sure which ones or doses  Denies current use of insulin or other injection medications  · Continue SSI  · Added 10u Lantus qhs,  had episode of hypoglycemia and Humalog decreased to 3 units t i d  Now with persistent hyperglycemia again will increase to 5 units t i d     VTE Pharmacologic Prophylaxis:   Pharmacologic: Enoxaparin (Lovenox)  Mechanical VTE Prophylaxis in Place: Yes    Patient Centered Rounds: I have performed bedside rounds with nursing staff today  Discussions with Specialists or Other Care Team Provider:  Nursing    Education and Discussions with Family / Patient:  Discussed with patient directly at bedside  Appreciate attending updating patient's family  Time Spent for Care: 20 minutes  More than 50% of total time spent on counseling and coordination of care as described above  Current Length of Stay: 8 day(s)    Current Patient Status: Inpatient   Certification Statement: The patient will continue to require additional inpatient hospital stay due to Pending decreased O2 requirement, PT/OT eval    Discharge Plan:  Pending clinical course    Code Status: Level 1 - Full Code      Subjective:   Patient states she continues to feel great  Would like to get up to the chair today  Has been up to the commode without any shortness of breath  Denies chest pain/palpitations, nausea vomiting, abdominal pain  Anxious for discharge  Objective:     Vitals:   Temp (24hrs), Av °F (36 7 °C), Min:97 8 °F (36 6 °C), Max:98 2 °F (36 8 °C)    Temp:  [97 8 °F (36 6 °C)-98 2 °F (36 8 °C)] 97 8 °F (36 6 °C)  HR:  [47-58] 58  Resp:  [18-20] 18  BP: (135-156)/(49-98) 142/98  SpO2:  [93 %-96 %] 93 %  Body mass index is 31 13 kg/m²  Input and Output Summary (last 24 hours):        Intake/Output Summary (Last 24 hours) at 2021 1021  Last data filed at 2021 6716  Gross per 24 hour   Intake 180 ml   Output 150 ml   Net 30 ml       Physical Exam:     Physical Exam  Vitals signs and nursing note reviewed  Constitutional:       Appearance: Normal appearance  Interventions: Nasal cannula in place  Comments: Appears comfortable, no acute distress   HENT:      Head: Normocephalic  Eyes:      General: No scleral icterus  Extraocular Movements: Extraocular movements intact  Conjunctiva/sclera: Conjunctivae normal    Neck:      Musculoskeletal: Normal range of motion  Cardiovascular:      Rate and Rhythm: Normal rate and regular rhythm  Heart sounds: S1 normal and S2 normal    Pulmonary:      Effort: Pulmonary effort is normal       Breath sounds: Examination of the right-lower field reveals rales  Examination of the left-lower field reveals rales  Rales present  No wheezing or rhonchi  Abdominal:      General: Bowel sounds are normal       Palpations: Abdomen is soft  Tenderness: There is no abdominal tenderness  There is no guarding or rebound  Musculoskeletal:         General: No swelling, tenderness or deformity  Comments: Able to move upper/lower extremities bilaterally without difficulty, no edema   Skin:     General: Skin is warm and dry  Neurological:      Mental Status: She is alert and oriented to person, place, and time  Psychiatric:         Mood and Affect: Mood normal          Speech: Speech normal          Behavior: Behavior normal            Additional Data:     Labs:    Results from last 7 days   Lab Units 04/25/21  0505  04/22/21  0451   WBC Thousand/uL 4 84   < > 3 79*   HEMOGLOBIN g/dL 11 8   < > 12 1   HEMATOCRIT % 36 0   < > 35 8   PLATELETS Thousands/uL 498*   < > 454*   BANDS PCT %  --   --  1   LYMPHO PCT % 7*  --  22   MONO PCT % 0*  --  4   EOS PCT % 0  --  0    < > = values in this interval not displayed       Results from last 7 days   Lab Units 04/25/21  0505   SODIUM mmol/L 138   POTASSIUM mmol/L 4 0 CHLORIDE mmol/L 102   CO2 mmol/L 26   BUN mg/dL 24   CREATININE mg/dL 0 91   ANION GAP mmol/L 10   CALCIUM mg/dL 8 5   ALBUMIN g/dL 2 2*   TOTAL BILIRUBIN mg/dL 0 50   ALK PHOS U/L 56   ALT U/L 56   AST U/L 32   GLUCOSE RANDOM mg/dL 279*         Results from last 7 days   Lab Units 04/25/21  0746 04/24/21  2107 04/24/21  1547 04/24/21  1241 04/24/21  0911 04/23/21  2118 04/23/21  1628 04/23/21  1202 04/23/21  0830 04/22/21  2154 04/22/21  1822 04/22/21  1748   POC GLUCOSE mg/dl 249* 78 213* 260* 187* 119 153* 190* 217* 119 82 61*         Results from last 7 days   Lab Units 04/22/21  0451 04/21/21  0530   PROCALCITONIN ng/ml <0 05 <0 05           * I Have Reviewed All Lab Data Listed Above  * Additional Pertinent Lab Tests Reviewed:  All Labs Within Last 24 Hours Reviewed    Imaging:    Imaging Reports Reviewed Today Include:   Imaging Personally Reviewed by Myself Includes:      Recent Cultures (last 7 days):           Last 24 Hours Medication List:   Current Facility-Administered Medications   Medication Dose Route Frequency Provider Last Rate    acetaminophen  650 mg Oral Q6H PRN Jinny Lindsay PA-C      atorvastatin  40 mg Oral HS Brook Marquez PA-C      benzonatate  100 mg Oral TID PRN Williams Mcgregor MD      cholecalciferol  2,000 Units Oral Daily Jinny Lindsay PA-C      cilostazol  50 mg Oral BID Williams Mcgregor MD      dexamethasone  6 mg Intravenous Q24H Jinny Lindsay PA-C      docusate sodium  100 mg Oral BID Jeramie Flores PA-C      enoxaparin  30 mg Subcutaneous Q12H [address] Jinny Lindsay PA-C      famotidine  20 mg Oral Daily Jinny Lindsay PA-C      furosemide  40 mg Intravenous Daily Tess Waterman MD      guaiFENesin  600 mg Oral Q12H [address] Tommie Sinclair MD      insulin glargine  10 Units Subcutaneous HS Tess Waterman MD      insulin lispro  1-6 Units Subcutaneous HS Cinthya PÉREZ PA-C      insulin lispro  2-12 Units Subcutaneous TID AC Cinthya PÉREZ PA-C      insulin lispro  5 Units Subcutaneous TID With Meals Lee Conteh MD      lisinopril  10 mg Oral Daily [facility]      metoprolol tartrate  25 mg Oral Q12H [address] [id number], MD      multivitamin-minerals  1 tablet Oral Daily Anival Frost PA-C      ondansetron  4 mg Intravenous Q6H PRN Anival Frost PA-C          Today, Patient Was Seen By: Pia Gambino PA-C    ** Please Note: Dictation voice to text software may have been used in the creation of this document   **

## 2021-04-25 NOTE — ASSESSMENT & PLAN NOTE
· Came in due to generalized fatigue over the past week  Patient had a COVID test outpatient but has been waiting for the results  · COVID positive in the ED  · 84% on room air, initially placed on 2 L, escalating O2 requirements currently on 9L midflow  Attempt at decreasing O2 to 2 L yesterday patient desat with minimal activity  ·   Ordered Actemra 4/19  · Cardiac markers   · Troponin negative  · CK normal 41  · BNP 1805  · Inflammatory markers   · CRP:  157 5 > 34 7  · D-dimer elevated at 1 69 > 0 50  · Ferritin 308 > pending  · In the ED patient received ceftriaxone, doxycycline and Decadron  · Continue vitamin cocktail, Pepcid  · Completed remdesivir  · Continue patient on atorvastatin 40 mg daily  · CXR 4/21 reveals slight progression of pneumonia, repeat 4/22 overall stable  · Repeat procalcitonin normal  · Also recommended for IV diuresis per pulm previously, good response to Lasix  · Appreciate ongoing pulmonology recommendations  · Able to be weaned to 6 L nasal cannula    Continue to wean as tolerated

## 2021-04-25 NOTE — ASSESSMENT & PLAN NOTE
· In the setting of COVID-19  · Now on 6 L nasal cannula, SpO2 97%  · Wean as able to maintain SpO2 > 90%

## 2021-04-26 PROBLEM — E66.9 OBESITY (BMI 30-39.9): Status: ACTIVE | Noted: 2021-04-26

## 2021-04-26 LAB
ANION GAP SERPL CALCULATED.3IONS-SCNC: 8 MMOL/L (ref 4–13)
BUN SERPL-MCNC: 28 MG/DL (ref 5–25)
CALCIUM SERPL-MCNC: 8.2 MG/DL (ref 8.3–10.1)
CHLORIDE SERPL-SCNC: 102 MMOL/L (ref 100–108)
CO2 SERPL-SCNC: 30 MMOL/L (ref 21–32)
CREAT SERPL-MCNC: 0.93 MG/DL (ref 0.6–1.3)
GFR SERPL CREATININE-BSD FRML MDRD: 57 ML/MIN/1.73SQ M
GLUCOSE SERPL-MCNC: 194 MG/DL (ref 65–140)
GLUCOSE SERPL-MCNC: 209 MG/DL (ref 65–140)
GLUCOSE SERPL-MCNC: 261 MG/DL (ref 65–140)
GLUCOSE SERPL-MCNC: 261 MG/DL (ref 65–140)
GLUCOSE SERPL-MCNC: 303 MG/DL (ref 65–140)
POTASSIUM SERPL-SCNC: 4.2 MMOL/L (ref 3.5–5.3)
SODIUM SERPL-SCNC: 140 MMOL/L (ref 136–145)

## 2021-04-26 PROCEDURE — 82948 REAGENT STRIP/BLOOD GLUCOSE: CPT

## 2021-04-26 PROCEDURE — 97116 GAIT TRAINING THERAPY: CPT

## 2021-04-26 PROCEDURE — 97163 PT EVAL HIGH COMPLEX 45 MIN: CPT

## 2021-04-26 PROCEDURE — 97167 OT EVAL HIGH COMPLEX 60 MIN: CPT

## 2021-04-26 PROCEDURE — 97535 SELF CARE MNGMENT TRAINING: CPT

## 2021-04-26 PROCEDURE — 99232 SBSQ HOSP IP/OBS MODERATE 35: CPT | Performed by: INTERNAL MEDICINE

## 2021-04-26 PROCEDURE — 99232 SBSQ HOSP IP/OBS MODERATE 35: CPT | Performed by: PHYSICIAN ASSISTANT

## 2021-04-26 PROCEDURE — 80048 BASIC METABOLIC PNL TOTAL CA: CPT | Performed by: PHYSICIAN ASSISTANT

## 2021-04-26 RX ADMIN — METOPROLOL TARTRATE 25 MG: 25 TABLET, FILM COATED ORAL at 08:26

## 2021-04-26 RX ADMIN — FUROSEMIDE 40 MG: 10 INJECTION, SOLUTION INTRAMUSCULAR; INTRAVENOUS at 08:27

## 2021-04-26 RX ADMIN — ATORVASTATIN CALCIUM 40 MG: 40 TABLET, FILM COATED ORAL at 22:00

## 2021-04-26 RX ADMIN — INSULIN LISPRO 2 UNITS: 100 INJECTION, SOLUTION INTRAVENOUS; SUBCUTANEOUS at 18:05

## 2021-04-26 RX ADMIN — CILOSTAZOL 50 MG: 50 TABLET ORAL at 18:04

## 2021-04-26 RX ADMIN — Medication 1 TABLET: at 08:26

## 2021-04-26 RX ADMIN — FAMOTIDINE 20 MG: 20 TABLET ORAL at 08:25

## 2021-04-26 RX ADMIN — ENOXAPARIN SODIUM 30 MG: 30 INJECTION SUBCUTANEOUS at 08:23

## 2021-04-26 RX ADMIN — INSULIN LISPRO 6 UNITS: 100 INJECTION, SOLUTION INTRAVENOUS; SUBCUTANEOUS at 08:24

## 2021-04-26 RX ADMIN — GUAIFENESIN 600 MG: 600 TABLET ORAL at 22:00

## 2021-04-26 RX ADMIN — CILOSTAZOL 50 MG: 50 TABLET ORAL at 08:26

## 2021-04-26 RX ADMIN — Medication 2000 UNITS: at 08:25

## 2021-04-26 RX ADMIN — ENOXAPARIN SODIUM 30 MG: 30 INJECTION SUBCUTANEOUS at 22:00

## 2021-04-26 RX ADMIN — DEXAMETHASONE SODIUM PHOSPHATE 6 MG: 4 INJECTION, SOLUTION INTRA-ARTICULAR; INTRALESIONAL; INTRAMUSCULAR; INTRAVENOUS; SOFT TISSUE at 19:25

## 2021-04-26 RX ADMIN — INSULIN GLARGINE 10 UNITS: 100 INJECTION, SOLUTION SUBCUTANEOUS at 22:02

## 2021-04-26 RX ADMIN — METOPROLOL TARTRATE 25 MG: 25 TABLET, FILM COATED ORAL at 22:01

## 2021-04-26 RX ADMIN — INSULIN LISPRO 8 UNITS: 100 INJECTION, SOLUTION INTRAVENOUS; SUBCUTANEOUS at 12:41

## 2021-04-26 RX ADMIN — GUAIFENESIN 600 MG: 600 TABLET ORAL at 08:25

## 2021-04-26 RX ADMIN — INSULIN LISPRO 2 UNITS: 100 INJECTION, SOLUTION INTRAVENOUS; SUBCUTANEOUS at 22:05

## 2021-04-26 RX ADMIN — LISINOPRIL 10 MG: 10 TABLET ORAL at 08:26

## 2021-04-26 NOTE — PLAN OF CARE
Problem: OCCUPATIONAL THERAPY ADULT  Goal: Performs self-care activities at highest level of function for planned discharge setting  See evaluation for individualized goals  Description: Treatment Interventions: ADL retraining, Compensatory technique education, Patient/family training, Endurance training, Functional transfer training          See flowsheet documentation for full assessment, interventions and recommendations  Note: Limitation: Decreased endurance, Decreased high-level ADLs  Prognosis: Good  Assessment: Pt is a 79 y o  female seen for OT evaluation at [address] [place] [address], admitted 4/17/2021 w/ Pneumonia due to COVID-19 virus  OT completed extensive review of pt's medical and social history  Comorbidities affecting pt's functional performance at time of assessment include: type II DM, HTN, and obesity  Personal factors affecting pt at time of IE include:limited home support  Pt with active OT orders  Prior to admission, pt was living alone in an apt with no steps to manage  Pt was I w/  ADLS and IADLS, (+) drove, & required no use of DME PTA  Upon evaluation: Pt requires no assistance for bed mobility, superivison for functional mobility/transfers, supervision for UB ADLs and supervision for LB ADLS 2* the following deficits impacting occupational performance: weakness and decreased tolerance  Pt to benefit from continued skilled OT tx while in the hospital to address deficits as defined above and maximize level of functional independence w ADL's and functional mobility  Occupational Performance areas to address include: bathing/shower, toilet hygiene, dressing, functional mobility and community mobility  Based on findings, pt is of high complexity  The patient's raw score on the AM-PAC Daily Activity inpatient short form is 20, standardized score is 42 03, greater than 39 4  Patients at this level are likely to benefit from DC to home   Please refer to the recommendation of the Occupational Therapist for safe DC planning  At this time, OT recommendations at time of discharge are home OT       OT Discharge Recommendation: Home with home health rehabilitation

## 2021-04-26 NOTE — ASSESSMENT & PLAN NOTE
· In the setting of COVID-19  · Greatly improving, originally had been weaned to room air however did have a SpO2 of 88% and therefore was put back on 1 L nasal cannula with improvement to 95%  · Will obtain home O2 evaluation  · Wean as able to maintain SpO2 > 90%

## 2021-04-26 NOTE — OCCUPATIONAL THERAPY NOTE
Occupational Therapy Evaluation 15:30-15:40 & Treat 15:41-15:56     Patient Name: David SEXTON Date: 4/26/2021  Problem List  Principal Problem:    Pneumonia due to COVID-19 virus  Active Problems:    Type 2 diabetes mellitus, without long-term current use of insulin ([facility] [address] )    Acute respiratory failure with hypoxia (HCC)    Hypertension    Obesity (BMI 30-39  9)    Past Medical History  Past Medical History:   Diagnosis Date    Diabetes mellitus ([facility] [address] )      Past Surgical History  History reviewed  No pertinent surgical history  04/26/21 1556   OT Last Visit   OT Visit Date 04/26/21   Note Type   Note type Evaluation  (& Treat)   Restrictions/Precautions   Weight Bearing Precautions Per Order No   Other Precautions Fall Risk;Droplet precautions;Contact/isolation  (+ COVID-19)   Pain Assessment   Pain Assessment Tool 0-10   Pain Score No Pain   Home Living   Type of [address] [place] [address] One level   Bathroom Shower/Tub Walk-in shower   Bathroom Toilet Standard   Bathroom Accessibility Accessible   Prior Function   Level of [place] Independent with ADLs and functional mobility   Lives With Alone   ADL Assistance Independent   IADLs Independent   Falls in the last 6 months 0   Vocational Retired   Subjective   Subjective Pt received in semisupine position  Pt agreeable to session      ADL   Eating Assistance 5  Supervision/Setup   Eating Deficit Setup   Grooming Assistance 5  Supervision/Setup   Grooming Deficit Setup   UB Bathing Assistance 5  Supervision/Setup   UB Bathing Deficit Setup   LB Bathing Assistance 5  Supervision/Setup   LB Bathing Deficit Setup   UB Dressing Assistance 5  Supervision/Setup   UB Dressing Deficit Setup   LB Dressing Assistance 5  Supervision/Setup   LB Dressing Deficit Setup   Toileting Assistance  5  Supervision/Setup   Toileting Deficit Setup   Bed Mobility   Supine to Sit 7  Independent   Sit to Supine 7  Independent   Transfers   Sit to Stand 5  Supervision   Stand to Sit 5  Supervision   Stand pivot 5  Supervision   Additional items   (RW)   Functional Mobility   Functional Mobility 5  Supervision   Additional Comments RW   Balance   Static Sitting Normal   Dynamic Sitting Good   Static Standing Fair +   Dynamic Standing Fair +   Activity Tolerance   Activity Tolerance Patient limited by fatigue   Medical Staff Made Aware PT Denise   Nurse Made Aware RN Miriam OLIVEIRA Assessment   RUE Assessment WFL   LUE Assessment   LUE Assessment WFL  (shoulder 0-100 deg)   Cognition   Overall Cognitive Status WFL   Arousal/Participation Alert   Attention Within functional limits   Orientation Level Oriented X4   Memory Within functional limits   Following Commands Follows all commands and directions without difficulty   Assessment   Limitation Decreased endurance;Decreased high-level ADLs   Prognosis Good   Assessment Pt is a 79 y o  female seen for OT evaluation at [facility], admitted 4/17/2021 w/ Pneumonia due to COVID-19 virus  OT completed extensive review of pt's medical and social history  Comorbidities affecting pt's functional performance at time of assessment include: type II DM, HTN, and obesity  Personal factors affecting pt at time of IE include:limited home support  Pt with active OT orders  Prior to admission, pt was living alone in an apt with no steps to manage  Pt was I w/  ADLS and IADLS, (+) drove, & required no use of DME PTA  Upon evaluation: Pt requires no assistance for bed mobility, superivison for functional mobility/transfers, supervision for UB ADLs and supervision for LB ADLS 2* the following deficits impacting occupational performance: weakness and decreased tolerance  Pt to benefit from continued skilled OT tx while in the hospital to address deficits as defined above and maximize level of functional independence w ADL's and functional mobility   Occupational Performance areas to address include: bathing/shower, toilet hygiene, dressing, functional mobility and community mobility  Based on findings, pt is of high complexity  The patient's raw score on the AM-PAC Daily Activity inpatient short form is 20, standardized score is 42 03, greater than 39 4  Patients at this level are likely to benefit from DC to home  Please refer to the recommendation of the Occupational Therapist for safe DC planning  At this time, OT recommendations at time of discharge are home OT  Goals   Patient Goals Pt wishes to get home   Plan   Treatment Interventions ADL retraining; Compensatory technique education;Patient/family training; Endurance training;Functional transfer training   Goal Expiration Date 05/06/21   OT Treatment Day 0   OT Frequency 1-2x/wk   Additional Treatment Session   Treatment Assessment Pt seen for OT tx session with focus on functional balance, functional mobility, ADL status, and transfer safety  Patient agreeable to OT treatment session  Performed functional mobility into bathroom with supervision with use of RW  Pt able to tolerate standing at sink for ~5 min to perform grooming task, however, noted drop in SpO2 (into low 80s)  Required to turn O2 to 2 L with slow improvement to >90%  Patient continues to be functioning below baseline level, occupational performance remains limited secondary to factors listed above, and pt at increased risk for falls and injury  From OT standpoint, recommendation at time of d/c would be Home OT  Patient to benefit from continued Occupational Therapy treatment while in the hospital to address deficits as defined above and maximize level of functional independence with ADLs and functional mobility  Pt left with call bell in reach, tray table in reach, needs met     Recommendation   OT Discharge Recommendation Home with home health rehabilitation   Recommended Equipment: Shower chair   AM-PAC Daily Activity Inpatient   Lower Body Dressing 3   Bathing 3   Toileting 3   Upper Body Dressing 3   Grooming 4   Eating 4   Daily Activity Raw Score 20   Daily Activity Standardized Score (Calc for Raw Score >=11) 42 03   AM-PAC Applied Cognition Inpatient   Following a Speech/Presentation 4   Understanding Ordinary Conversation 4   Taking Medications 4   Remembering Where Things Are Placed or Put Away 4   Remembering List of 4-5 Errands 4   Taking Care of Complicated Tasks 4   Applied Cognition Raw Score 24   Applied Cognition Standardized Score 62 21         Pt will achieve the following goals within 10 days  *Pt will complete UB bathing and dressing with mod I     *Pt will complete LB bathing and dressing with mod I      * Pt will complete toileting w/ mod I w/ G hygiene/thoroughness using DME PRN    *Pt will perform functional transfers with on/off all surfaces with mod I using DME as needed w/ G balance/safety  *Pt will increase standing tolerance to 5 minutes in order to complete sinkside ADL task with SpO2 at 90% or above  *Pt will identify 3-5 fall risks during ADL routine to ensure home safety upon discharge  *Pt will improve functional mobility during ADL/IADL/leisure tasks to mod I using DME as needed w/ G balance/safety       *Assess DME needs         Lizzie Valle OTR/L

## 2021-04-26 NOTE — PLAN OF CARE
Problem: Potential for Falls  Goal: Patient will remain free of falls  Description: INTERVENTIONS:  - Assess patient frequently for physical needs  -  Identify cognitive and physical deficits and behaviors that affect risk of falls  -  [place] fall precautions as indicated by assessment   - Educate patient/family on patient safety including physical limitations  - Instruct patient to call for assistance with activity based on assessment  - Modify environment to reduce risk of injury  - Consider OT/PT consult to assist with strengthening/mobility  Outcome: Progressing     Problem: Nutrition/Hydration-ADULT  Goal: Nutrient/Hydration intake appropriate for improving, restoring or maintaining nutritional needs  Description: Monitor and assess patient's nutrition/hydration status for malnutrition  Collaborate with interdisciplinary team and initiate plan and interventions as ordered  Monitor patient's weight and dietary intake as ordered or per policy  Utilize nutrition screening tool and intervene as necessary  Determine patient's food preferences and provide high-protein, high-caloric foods as appropriate       INTERVENTIONS:  - Monitor oral intake, urinary output, labs, and treatment plans  - Assess nutrition and hydration status and recommend course of action  - Evaluate amount of meals eaten  - Assist patient with eating if necessary   - Allow adequate time for meals  - Recommend/ encourage appropriate diets, oral nutritional supplements, and vitamin/mineral supplements  - Order, calculate, and assess calorie counts as needed  - Recommend, monitor, and adjust tube feedings and TPN/PPN based on assessed needs  - Assess need for intravenous fluids  - Provide specific nutrition/hydration education as appropriate  - Include patient/family/caregiver in decisions related to nutrition  Outcome: Progressing     Problem: PAIN - ADULT  Goal: Verbalizes/displays adequate comfort level or baseline comfort level  Description: Interventions:  - Encourage patient to monitor pain and request assistance  - Assess pain using appropriate pain scale  - Administer analgesics based on type and severity of pain and evaluate response  - Implement non-pharmacological measures as appropriate and evaluate response  - Consider cultural and social influences on pain and pain management  - Notify physician/advanced practitioner if interventions unsuccessful or patient reports new pain  Outcome: Progressing     Problem: INFECTION - ADULT  Goal: Absence or prevention of progression during hospitalization  Description: INTERVENTIONS:  - Assess and monitor for signs and symptoms of infection  - Monitor lab/diagnostic results  - Monitor all insertion sites, i e  indwelling lines, tubes, and drains  - Monitor endotracheal if appropriate and nasal secretions for changes in amount and color  - [place] appropriate cooling/warming therapies per order  - Administer medications as ordered  - Instruct and encourage patient and family to use good hand hygiene technique  - Identify and instruct in appropriate isolation precautions for identified infection/condition  Outcome: Progressing     Problem: SAFETY ADULT  Goal: Patient will remain free of falls  Description: INTERVENTIONS:  - Assess patient frequently for physical needs  -  Identify cognitive and physical deficits and behaviors that affect risk of falls    -  [place] fall precautions as indicated by assessment   - Educate patient/family on patient safety including physical limitations  - Instruct patient to call for assistance with activity based on assessment  - Modify environment to reduce risk of injury  - Consider OT/PT consult to assist with strengthening/mobility  Outcome: Progressing  Goal: Maintain or return to baseline ADL function  Description: INTERVENTIONS:  -  Assess patient's ability to carry out ADLs; assess patient's baseline for ADL function and identify physical deficits which impact ability to perform ADLs (bathing, care of mouth/teeth, toileting, grooming, dressing, etc )  - Assess/evaluate cause of self-care deficits   - Assess range of motion  - Assess patient's mobility; develop plan if impaired  - Assess patient's need for assistive devices and provide as appropriate  - Encourage maximum independence but intervene and supervise when necessary  - Involve family in performance of ADLs  - Assess for home care needs following discharge   - Consider OT consult to assist with ADL evaluation and planning for discharge  - Provide patient education as appropriate  Outcome: Progressing  Goal: Maintain or return mobility status to optimal level  Description: INTERVENTIONS:  - Assess patient's baseline mobility status (ambulation, transfers, stairs, etc )    - Identify cognitive and physical deficits and behaviors that affect mobility  - Identify mobility aids required to assist with transfers and/or ambulation (gait belt, sit-to-stand, lift, walker, cane, etc )  - [place] fall precautions as indicated by assessment  - Record patient progress and toleration of activity level on Mobility SBAR; progress patient to next Phase/Stage  - Instruct patient to call for assistance with activity based on assessment  - Consider rehabilitation consult to assist with strengthening/weightbearing, etc   Outcome: Progressing     Problem: DISCHARGE PLANNING  Goal: Discharge to home or other facility with appropriate resources  Description: INTERVENTIONS:  - Identify barriers to discharge w/patient and caregiver  - Arrange for needed discharge resources and transportation as appropriate  - Identify discharge learning needs (meds, wound care, etc )  - Arrange for interpretive services to assist at discharge as needed  - Refer to Case Management Department for coordinating discharge planning if the patient needs post-hospital services based on physician/advanced practitioner order or complex needs related to functional status, cognitive ability, or social support system  Outcome: Progressing     Problem: Knowledge Deficit  Goal: Patient/family/caregiver demonstrates understanding of disease process, treatment plan, medications, and discharge instructions  Description: Complete learning assessment and assess knowledge base    Interventions:  - Provide teaching at level of understanding  - Provide teaching via preferred learning methods  Outcome: Progressing     Problem: RESPIRATORY - ADULT  Goal: Achieves optimal ventilation and oxygenation  Description: INTERVENTIONS:  - Assess for changes in respiratory status  - Assess for changes in mentation and behavior  - Position to facilitate oxygenation and minimize respiratory effort  - Oxygen administered by appropriate delivery if ordered  - Initiate smoking cessation education as indicated  - Encourage broncho-pulmonary hygiene including cough, deep breathe, Incentive Spirometry  - Assess the need for suctioning and aspirate as needed  - Assess and instruct to report SOB or any respiratory difficulty  - Respiratory Therapy support as indicated  Outcome: Progressing     Problem: MUSCULOSKELETAL - ADULT  Goal: Maintain or return mobility to safest level of function  Description: INTERVENTIONS:  - Assess patient's ability to carry out ADLs; assess patient's baseline for ADL function and identify physical deficits which impact ability to perform ADLs (bathing, care of mouth/teeth, toileting, grooming, dressing, etc )  - Assess/evaluate cause of self-care deficits   - Assess range of motion  - Assess patient's mobility  - Assess patient's need for assistive devices and provide as appropriate  - Encourage maximum independence but intervene and supervise when necessary  - Involve family in performance of ADLs  - Assess for home care needs following discharge   - Consider OT consult to assist with ADL evaluation and planning for discharge  - Provide patient education as appropriate  Outcome: Progressing     Problem: Prexisting or High Potential for Compromised Skin Integrity  Goal: Skin integrity is maintained or improved  Description: INTERVENTIONS:  - Identify patients at risk for skin breakdown  - Assess and monitor skin integrity  - Assess and monitor nutrition and hydration status  - Monitor labs   - Assess for incontinence   - Turn and reposition patient  - Assist with mobility/ambulation  - Relieve pressure over bony prominences  - Avoid friction and shearing  - Provide appropriate hygiene as needed including keeping skin clean and dry  - Evaluate need for skin moisturizer/barrier cream  - Collaborate with interdisciplinary team   - Patient/family teaching  - Consider wound care consult   Outcome: Progressing

## 2021-04-26 NOTE — PHYSICAL THERAPY NOTE
PT eval   04/26/21 1555   PT Last Visit   PT Visit Date 04/26/21   Note Type   Note type Evaluation  (tx)   Pain Assessment   Pain Assessment Tool 0-10   Pain Score No Pain   Home Living   Type of [address] One level   Prior Function   Level of [place] Independent with ADLs and functional mobility   Lives With Alone   Receives Help From Family   ADL Assistance Independent   IADLs Independent   Falls in the last 6 months 0   Vocational Retired   Restrictions/Precautions   [place] Bearing Precautions Per Order No   Other Precautions Droplet precautions; Airborne/isolation;Contact/isolation;O2  (2L)   General   Additional Pertinent History adm with covid pneumonia   Family/Caregiver Present No   Cognition   Overall Cognitive Status WFL   Arousal/Participation Alert   Orientation Level Oriented X4   Memory Within functional limits   Following Commands Follows all commands and directions without difficulty   RUE Assessment   RUE Assessment WFL   LUE Assessment   LUE Assessment WFL  (shld limited not new)   RLE Assessment   RLE Assessment WFL   LLE Assessment   LLE Assessment WFL   Coordination   Movements are Fluid and Coordinated 1   Sensation WFL   Proprioception   RLE Proprioception Grossly intact   LLE Proprioception Grossly Intact   Bed Mobility   Rolling R 7  Independent   Rolling L 7  Independent   Supine to Sit 7  Independent   Sit to Supine 7  Independent   Transfers   Sit to Stand 5  Supervision   Stand to Sit 5  Supervision   Stand pivot 5  Supervision   Ambulation/Elevation   Gait pattern Improper Weight shift; Wide CHAU; Inconsistent natasha; Short stride; Step to   Gait Assistance 5  Supervision   Additional items Verbal cues   Assistive Device None   Distance 25'   Balance   Static Sitting Normal   Dynamic Sitting Good   Static Standing Fair   Dynamic Standing Fair   Ambulatory Fair   Endurance Deficit   Endurance Deficit Yes   Endurance Deficit Description 02 sat drops to 85% on 2L 02 requires seated rest to recover   Activity Tolerance   Activity Tolerance Patient limited by fatigue   Medical Staff Made Aware OT Bertha   Nurse Made Aware RN Miriam   Assessment   Prognosis Good   Problem List Decreased endurance   Assessment Pt able to mobilize to BR to brush teeth  Does desat with activity to 85% on 2L  Reviewed deep breathing and seated rests to recover  REports slighlty unsteday  Will trial RW  See tx note to follow  Recommend home PT at d c for pacing and 02 safety  Barriers to Discharge   (medical clearance)   Goals   Patient Goals get better go home take a shower   STG Expiration Date 04/27/21   Short Term Goal #1 1) safe functional mobility avoiding sob   Plan   Treatment/Interventions Gait training   PT Frequency Follow-up visit only   Recommendation   PT Discharge Recommendation Home with home health rehabilitation   PT - OK to Discharge Yes   AM-PAC Basic Mobility Inpatient   Turning in Bed Without Bedrails 4   Lying on Back to Sitting on Edge of Flat Bed 4   Moving Bed to Chair 4   Standing Up From Chair 4   Walk in Room 4   Climb 3-5 Stairs 3   Basic Mobility Inpatient Raw Score 23   Basic Mobility Standardized Score 50 88   Modified [place] Scale   Modified Traill Scale 2   Denise Grajeda, PT  PT tx  Time in 1540  Time out 1555  PT tx continues with gait training with trial of RW  Pt amb with rw 25' level  Lets walker aside when approaching chair  Appears to find more hindrance than assistance with rw  Declines use for home states her home is small and will not need  Discussed probable need for home 02 and home PT   Pt agreeable to home PT  Care manager informed   D/c PT

## 2021-04-26 NOTE — ASSESSMENT & PLAN NOTE
Lab Results   Component Value Date    HGBA1C 8 8 (H) 04/17/2021       Recent Labs     04/25/21  1712 04/25/21  2111 04/26/21  0822 04/26/21  1134   POCGLU 120 120 261* 303*       Blood Sugar Average: Last 72 hrs:  (P) 199  · Patient reports she takes oral diabetes medications at home, She is not sure which ones or doses  Denies current use of insulin or other injection medications  · Continue SSI  · Added 10u Lantus qhs, 4/23 had episode of hypoglycemia and Humalog decreased to 3 units t i d   Now with persistent hyperglycemia again will increase to 5 units t i d

## 2021-04-26 NOTE — ASSESSMENT & PLAN NOTE
· Came in due to generalized fatigue over the past week  Patient had a COVID test outpatient but has been waiting for the results  · COVID positive in the ED  · 84% on room air, initially placed on 2 L, escalating O2 requirements currently on 9L midflow    Attempt at decreasing O2 to 2 L yesterday patient desat with minimal activity  ·   Ordered Actemra 4/19  · Cardiac markers   · Troponin negative  · CK normal 41  · BNP 1805  · Inflammatory markers   · CRP:  157 5 > 34 7  · D-dimer elevated at 1 69 > 0 50  · Ferritin 308 > pending  · In the ED patient received ceftriaxone, doxycycline and Decadron  · Continue vitamin cocktail, Pepcid  · Completed remdesivir  · Continue patient on atorvastatin 40 mg daily  · CXR 4/21 reveals slight progression of pneumonia, repeat 4/22 overall stable  · Repeat procalcitonin normal  · Also recommended for IV diuresis per pulm previously, good response to Lasix  · Appreciate ongoing pulmonology recommendations  · Oxygen requirement continues to improve, weaned down to room air; increased back to 1 L nasal cannula due to SpO2 88% however on 1 L approximately 95% at rest

## 2021-04-26 NOTE — ASSESSMENT & PLAN NOTE
·  She continues to do well    Oxygen is down to 1 L NC  ·  Continue Decadron 6mg daily to complete 10 days total   ·  Completed Remdesivir  ·  Continue Vimatin C,D and Zinc  ·  Continue Pepcid, Lipitor

## 2021-04-26 NOTE — PROGRESS NOTES
Timmy Perkinson     Progress Note - Chaparro Coelho 1940, 79 y o  female MRN: [id number]  Unit/Bed#: -Ruben Encounter: [id number]  Primary Care Provider: Li Xavier MD   Date and time admitted to hospital: 4/17/2021  8:22 PM    * Pneumonia due to COVID-19 virus  Assessment & Plan  · Came in due to generalized fatigue over the past week  Patient had a COVID test outpatient but has been waiting for the results  · COVID positive in the ED  · 84% on room air, initially placed on 2 L, escalating O2 requirements currently on 9L midflow    Attempt at decreasing O2 to 2 L yesterday patient desat with minimal activity  ·   Ordered Actemra 4/19  · Cardiac markers   · Troponin negative  · CK normal 41  · BNP 1805  · Inflammatory markers   · CRP:  157 5 > 34 7  · D-dimer elevated at 1 69 > 0 50  · Ferritin 308 > pending  · In the ED patient received ceftriaxone, doxycycline and Decadron  · Continue vitamin cocktail, Pepcid  · Completed remdesivir  · Continue patient on atorvastatin 40 mg daily  · CXR 4/21 reveals slight progression of pneumonia, repeat 4/22 overall stable  · Repeat procalcitonin normal  · Also recommended for IV diuresis per pulm previously, good response to Lasix  · Appreciate ongoing pulmonology recommendations  · Oxygen requirement continues to improve, weaned down to room air; increased back to 1 L nasal cannula due to SpO2 88% however on 1 L approximately 95% at rest    Hypertension  Assessment & Plan  · Pressures have been elevated during admission  · Continue 10mg lisinopril and 25mg metoprolol BID    Acute respiratory failure with hypoxia (HCC)  Assessment & Plan  · In the setting of COVID-19  · Greatly improving, originally had been weaned to room air however did have a SpO2 of 88% and therefore was put back on 1 L nasal cannula with improvement to 95%  · Will obtain home O2 evaluation  · Wean as able to maintain SpO2 > 90%    Type 2 diabetes mellitus, without long-term current use of insulin [facility])  Assessment & Plan  Lab Results   Component Value Date    HGBA1C 8 8 (H) 2021       Recent Labs     21  1712 21  2111 21  0822 21  1134   POCGLU 120 120 261* 303*       Blood Sugar Average: Last 72 hrs:  (P) 199  · Patient reports she takes oral diabetes medications at home, She is not sure which ones or doses  Denies current use of insulin or other injection medications  · Continue SSI  · Added 10u Lantus qhs,  had episode of hypoglycemia and Humalog decreased to 3 units t i d  Now with persistent hyperglycemia again will increase to 5 units t i d     VTE Pharmacologic Prophylaxis:   Pharmacologic: Enoxaparin (Lovenox)  Mechanical VTE Prophylaxis in Place: Yes    Patient Centered Rounds: I have performed bedside rounds with nursing staff today  Discussions with Specialists or Other Care Team Provider:  Nursing, CM, pulmonology    Education and Discussions with Family / Patient:  Discussed with patient directly at bedside  Discussed with patient's daughter via phone  Time Spent for Care: 30 minutes  More than 50% of total time spent on counseling and coordination of care as described above  Current Length of Stay: 9 day(s)    Current Patient Status: Inpatient   Certification Statement: The patient will continue to require additional inpatient hospital stay due to Pending PT/OT evaluation and home O2 eval    Discharge Plan:  Pending PT/OT and home O2 eval    Code Status: Level 1 - Full Code      Subjective:   Patient continues to feel well and is eager to work with physical therapy  Denies chest pain/palpitations, shortness of breath, nausea vomiting, abdominal pain  Anxious for discharge      Objective:     Vitals:   Temp (24hrs), Av 7 °F (36 5 °C), Min:97 7 °F (36 5 °C), Max:97 7 °F (36 5 °C)    Temp:  [97 7 °F (36 5 °C)] 97 7 °F (36 5 °C)  HR:  [52-54] 52  BP: (131-138)/(56-59) 138/59  SpO2:  [92 %-95 %] 92 %  Body mass index is 31 13 kg/m²  Input and Output Summary (last 24 hours): Intake/Output Summary (Last 24 hours) at 4/26/2021 1406  Last data filed at 4/26/2021 7372  Gross per 24 hour   Intake 480 ml   Output 500 ml   Net -20 ml       Physical Exam:     Physical Exam  Vitals signs and nursing note reviewed  Constitutional:       Appearance: Normal appearance  Interventions: Nasal cannula in place  Comments: Appears comfortable, no acute distress   HENT:      Head: Normocephalic  Eyes:      General: No scleral icterus  Extraocular Movements: Extraocular movements intact  Conjunctiva/sclera: Conjunctivae normal    Neck:      Musculoskeletal: Normal range of motion  Cardiovascular:      Rate and Rhythm: Normal rate and regular rhythm  Heart sounds: S1 normal and S2 normal    Pulmonary:      Effort: Pulmonary effort is normal       Breath sounds: Normal breath sounds  No wheezing, rhonchi or rales  Abdominal:      General: Bowel sounds are normal       Palpations: Abdomen is soft  Tenderness: There is no abdominal tenderness  There is no guarding or rebound  Musculoskeletal:         General: No swelling, tenderness or deformity  Comments: Able to move upper/lower extremities bilaterally without difficulty, no edema   Skin:     General: Skin is warm and dry  Neurological:      Mental Status: She is alert and oriented to person, place, and time  Psychiatric:         Mood and Affect: Mood normal          Speech: Speech normal          Behavior: Behavior normal            Additional Data:     Labs:    Results from last 7 days   Lab Units 04/25/21  0505  04/22/21  0451   WBC Thousand/uL 4 84   < > 3 79*   HEMOGLOBIN g/dL 11 8   < > 12 1   HEMATOCRIT % 36 0   < > 35 8   PLATELETS Thousands/uL 498*   < > 454*   BANDS PCT %  --   --  1   LYMPHO PCT % 7*  --  22   MONO PCT % 0*  --  4   EOS PCT % 0  --  0    < > = values in this interval not displayed       Results from last 7 days Lab Units 04/26/21  0534 04/25/21  0505   SODIUM mmol/L 140 138   POTASSIUM mmol/L 4 2 4 0   CHLORIDE mmol/L 102 102   CO2 mmol/L 30 26   BUN mg/dL 28* 24   CREATININE mg/dL 0 93 0 91   ANION GAP mmol/L 8 10   CALCIUM mg/dL 8 2* 8 5   ALBUMIN g/dL  --  2 2*   TOTAL BILIRUBIN mg/dL  --  0 50   ALK PHOS U/L  --  56   ALT U/L  --  56   AST U/L  --  32   GLUCOSE RANDOM mg/dL 261* 279*         Results from last 7 days   Lab Units 04/26/21  1134 04/26/21  0822 04/25/21  2111 04/25/21  1712 04/25/21  1224 04/25/21  0746 04/24/21  2107 04/24/21  1547 04/24/21  1241 04/24/21  0911 04/23/21  2118 04/23/21  1628   POC GLUCOSE mg/dl 303* 261* 120 120 316* 249* 78 213* 260* 187* 119 153*         Results from last 7 days   Lab Units 04/22/21  0451 04/21/21  0530   PROCALCITONIN ng/ml <0 05 <0 05           * I Have Reviewed All Lab Data Listed Above  * Additional Pertinent Lab Tests Reviewed:  All Labs Within Last 24 Hours Reviewed    Imaging:    Imaging Reports Reviewed Today Include:   Imaging Personally Reviewed by Myself Includes:      Recent Cultures (last 7 days):           Last 24 Hours Medication List:   Current Facility-Administered Medications   Medication Dose Route Frequency Provider Last Rate    acetaminophen  650 mg Oral Q6H PRN Keren Giordano PA-C      atorvastatin  40 mg Oral HS Keren Giordano PA-C      benzonatate  100 mg Oral TID PRN Chris Estevez MD      cholecalciferol  2,000 Units Oral Daily Keren Gioradno PA-C      cilostazol  50 mg Oral BID Chris Estevez MD      dexamethasone  6 mg Intravenous Q24H Keren Giordano PA-C      enoxaparin  30 mg Subcutaneous Q12H [facility] Brook Marquez PA-C      famotidine  20 mg Oral Daily Keren Giordano PA-C      furosemide  40 mg Intravenous Daily Marleny Allen MD      guaiFENesin  600 mg Oral Q12H [facility] Tommie Crespo MD      insulin glargine  10 Units Subcutaneous HS Marleny Allen MD      insulin lispro  1-6 Units Subcutaneous HS Cinthya PÉREZ PA-C      insulin lispro  2-12 Units Subcutaneous TID AC Cinthya PÉREZ PA-C      insulin lispro  5 Units Subcutaneous TID With Meals Naheed Berman MD      lisinopril  10 mg Oral Daily [address] [facility] DINH PÉREZ      metoprolol tartrate  25 mg Oral Q12H [address] [id number], MD      multivitamin-minerals  1 tablet Oral Daily Dacia Ryan PA-C      ondansetron  4 mg Intravenous Q6H PRN Dacia Ryan PA-C          Today, Patient Was Seen By: Bryan Chanel PA-C    ** Please Note: Dictation voice to text software may have been used in the creation of this document   **

## 2021-04-26 NOTE — PROGRESS NOTES
Progress Note - Pulmonary   Elvin Gerber 79 y o  female MRN: [id number]  Unit/Bed#: -01 Encounter: [id number]  Code Status: Level 1 - Full Code    Felipe Rand is a 79 y o  Past Medical History:   Diagnosis Date    Diabetes mellitus (HCC)        Assessment/Plan:   Obesity (BMI 30-39  9)  Assessment & Plan  Independent risk factor for COVID-19    Acute respiratory failure with hypoxia (HCC)  Assessment & Plan  Improving 2/2 COVID     * Pneumonia due to COVID-19 virus  Assessment & Plan  1  She continues to do well    Oxygen is down to 4 L NC  2   Continue Decadron 6mg daily to complete 10 days total   3   Completed Remdesivir  4   Continue Vimatin C,D and Zinc  5   Continue Pepcid, Lipitor        D/C and Follow up Needs:   Discussed with the primary team    Subjective: Pt seen and examined at bedside    Chief Complaint: No new complaints     Vitals:   Temp:  [97 7 °F (36 5 °C)] 97 7 °F (36 5 °C)  HR:  [52-54] 52  BP: (131-138)/(56-59) 138/59  Weight (last 2 days)     None        Vitals:    21 0903 21 2116 21 0735 21 0828   BP:  131/56 138/59    Pulse:  (!) 54 (!) 52    Resp:       Temp:   97 7 °F (36 5 °C)    TempSrc:       SpO2: 93%  95% 92%   Weight:       Height:         Temp (24hrs), Av 7 °F (36 5 °C), Min:97 7 °F (36 5 °C), Max:97 7 °F (36 5 °C)  Current: Temperature: 97 7 °F (36 5 °C)        SpO2: SpO2: 92 %, SpO2 Activity: SpO2 Activity: At Rest, SpO2 Device: O2 Device: Nasal cannula, Capnography:    O2 Flow Rate (L/min): 6 L/min         Nutrition:        Diet Orders   (From admission, onward)             Start     Ordered    21 1332  Dietary nutrition supplements  Once     Question Answer Comment   Select Supplement: [employer], Lunch, Dinner        21 1331    21 2348  Diet Sukumar/CHO Controlled; Consistent Carbohydrate Diet Level 2 (5 carb servings/75 grams CHO/meal)  Diet effective now     Question Answer Comment   Diet Type Sukumar/CHO Controlled    Sukumar/CHO Controlled Consistent Carbohydrate Diet Level 2 (5 carb servings/75 grams CHO/meal)    RD to adjust diet per protocol? Yes        04/17/21 4102                Ins/Outs:   I/O       04/24 0701 - 04/25 0700 04/25 0701 - 04/26 0700 04/26 0701 - 04/27 0700    P  O  420 840     Total Intake(mL/kg) 420 (5 4) 840 (10 9)     Urine (mL/kg/hr)  650 (0 4)     Stool  0     Total Output  650     Net +420 +190            Unmeasured Stool Occurrence  1 x           Lines/Drains:  Invasive Devices     Peripheral Intravenous Line            Peripheral IV 04/25/21 Left Forearm less than 1 day                 Active medications:  Scheduled Meds:  Current Facility-Administered Medications   Medication Dose Route Frequency Provider Last Rate    acetaminophen  650 mg Oral Q6H PRN Dotty SpiDINH irene      atorvastatin  40 mg Oral HS Brook Marquez PA-C      benzonatate  100 mg Oral TID PRN Darron Dunn MD      cholecalciferol  2,000 Units Oral Daily Dotty SpiDINH irene      cilostazol  50 mg Oral BID Darron Dunn MD      dexamethasone  6 mg Intravenous Q24H Dotty SpiDINH irene      enoxaparin  30 mg Subcutaneous Q12H [address] Dotrenita Simon PA-C      famotidine  20 mg Oral Daily Dotty SpiDINH irene      furosemide  40 mg Intravenous Daily Moiz Emery MD      guaiFENesin  600 mg Oral Q12H [address] Darron Dunn MD      insulin glargine  10 Units Subcutaneous HS Moiz Emery MD      insulin lispro  1-6 Units Subcutaneous HS Cinthya PÉREZ PA-C      insulin lispro  2-12 Units Subcutaneous TID AC Cinthya PÉREZ PA-C      insulin lispro  5 Units Subcutaneous TID With Meals Moiz Emery MD      lisinopril  10 mg Oral Daily [address] DINH PÉREZ      metoprolol tartrate  25 mg Oral Q12H [address] Moiz Emery MD      multivitamin-minerals  1 tablet Oral Daily Brook Marquez PA-C      ondansetron  4 mg Intravenous Q6H PRN Brook Marquez PA-C       PRN Meds:acetaminophen, 650 mg, Q6H PRN  benzonatate, 100 mg, TID PRN  ondansetron, 4 mg, Q6H PRN      ____________________________________________________________________    Physical Exam  Constitutional:       Appearance: Normal appearance  HENT:      Head: Normocephalic and atraumatic  Nose: No congestion or rhinorrhea  Mouth/Throat:      Mouth: Mucous membranes are moist       Pharynx: Oropharynx is clear  No oropharyngeal exudate  Eyes:      General: No scleral icterus  Right eye: No discharge  Left eye: No discharge  Conjunctiva/sclera: Conjunctivae normal    Neck:      Musculoskeletal: Normal range of motion and neck supple  Cardiovascular:      Rate and Rhythm: Normal rate and regular rhythm  No extrasystoles are present  Pulses: Normal pulses  No decreased pulses  Heart sounds: Normal heart sounds  No gallop  Pulmonary:      Effort: Pulmonary effort is normal       Breath sounds: No stridor  Decreased breath sounds present  No wheezing or rhonchi  Comments: Decreased breath sound bilaterally limited air entry  Abdominal:      General: Abdomen is flat  Bowel sounds are normal       Palpations: Abdomen is soft  Musculoskeletal:         General: No swelling or tenderness  Skin:     General: Skin is warm  Coloration: Skin is not pale  Findings: No erythema  Neurological:      General: No focal deficit present  Mental Status: She is alert and oriented to person, place, and time  Mental status is at baseline     Psychiatric:         Mood and Affect: Mood normal          Behavior: Behavior normal        ____________________________________________________________________    Invasive/non-invasive ventilation settings   Respiratory    Lab Data (Last 4 hours)    None         O2/Vent Data (Last 4 hours)    None                Laboratory and Diagnostics:  Results from last 7 days   Lab Units 04/25/21  0505 04/24/21  0458 04/23/21  0539 04/22/21  0451 04/20/21  0542   WBC Thousand/uL 4 84 4 97 4 64 3 79* 6  03   HEMOGLOBIN g/dL 11 8 12 2 12 6 12 1 12 3   HEMATOCRIT % 36 0 36 9 37 8 35 8 37 1   PLATELETS Thousands/uL 498* 521* 521* 454* 416*   BANDS PCT %  --   --   --  1  --    MONO PCT % 0*  --   --  4  --      Results from last 7 days   Lab Units 04/26/21  0534 04/25/21  0505 04/24/21  0458 04/23/21  0539 04/22/21  0451 04/21/21  0637 04/20/21  0542   SODIUM mmol/L 140 138 141 138 139 138 141   POTASSIUM mmol/L 4 2 4 0 4 1 4 1 3 9 4 1 3 9   CHLORIDE mmol/L 102 102 105 103 104 105 106   CO2 mmol/L 30 26 28 22 26 22 19*   ANION GAP mmol/L 8 10 8 13 9 11 16*   BUN mg/dL 28* 24 20 20 22 23 21   CREATININE mg/dL 0 93 0 91 0 78 0 92 0 87 0 78 0 67   CALCIUM mg/dL 8 2* 8 5 8 2* 8 3 7 9* 8 2* 8 3   GLUCOSE RANDOM mg/dL 261* 279* 211* 230* 213* 209* 208*   ALT U/L  --  56 52 49 36 30 25   AST U/L  --  32 37 37 33 24 21   ALK PHOS U/L  --  56 60 63 61 54 57   ALBUMIN g/dL  --  2 2* 2 3* 2 4* 2 4* 2 2* 2 2*   TOTAL BILIRUBIN mg/dL  --  0 50 0 60 0 50 0 50 0 40 0 40                       ABG:    VBG:    Results from last 7 days   Lab Units 04/22/21  0451 04/21/21  0530   PROCALCITONIN ng/ml <0 05 <0 05       Micro          Reviewed images on PACS myself  Imaging:   XR chest portable   Final Result by Cynthia Turcios MD (04/22 1425)      No definite change in bilateral groundglass opacity, slightly greater on the right, due to Covid-19  Workstation performed: [id number]         XR chest portable   Final Result by Fleeta Meigs, MD (04/21 2151)      Patchy multifocal parenchymal airspace opacity is slightly progressed when compared to prior examination consistent with subtle progression of viral pneumonia due to COVID-19 infection  Workstation performed: [id number]         XR chest portable   Final Result by Garcia Pagan MD (04/18 8106)      Hazy infiltrate throughout the right lung most prominent in right parahilar position  Probable left basilar infiltrate   Findings are compatible with Covid pneumonia  Workstation performed: [id number]             Micro:   Lab Results   Component Value Date    BLOODCX No Growth After 5 Days  04/17/2021    BLOODCX No Growth After 5 Days   04/17/2021            Invalid input(s): Lionel Nyhan

## 2021-04-27 VITALS
WEIGHT: 170.19 LBS | SYSTOLIC BLOOD PRESSURE: 142 MMHG | RESPIRATION RATE: 18 BRPM | HEIGHT: 62 IN | OXYGEN SATURATION: 97 % | DIASTOLIC BLOOD PRESSURE: 58 MMHG | TEMPERATURE: 97.5 F | HEART RATE: 62 BPM | BODY MASS INDEX: 31.32 KG/M2

## 2021-04-27 DIAGNOSIS — E11.9 TYPE 2 DIABETES MELLITUS WITHOUT COMPLICATION, WITHOUT LONG-TERM CURRENT USE OF INSULIN (HCC): ICD-10-CM

## 2021-04-27 LAB
DME PARACHUTE DELIVERY DATE ACTUAL: NORMAL
DME PARACHUTE DELIVERY DATE EXPECTED: NORMAL
DME PARACHUTE DELIVERY DATE REQUESTED: NORMAL
DME PARACHUTE ITEM DESCRIPTION: NORMAL
DME PARACHUTE ORDER STATUS: NORMAL
DME PARACHUTE SUPPLIER NAME: NORMAL
DME PARACHUTE SUPPLIER PHONE: NORMAL
GLUCOSE SERPL-MCNC: 232 MG/DL (ref 65–140)
GLUCOSE SERPL-MCNC: 254 MG/DL (ref 65–140)

## 2021-04-27 PROCEDURE — 82948 REAGENT STRIP/BLOOD GLUCOSE: CPT

## 2021-04-27 PROCEDURE — 99239 HOSP IP/OBS DSCHRG MGMT >30: CPT | Performed by: PHYSICIAN ASSISTANT

## 2021-04-27 PROCEDURE — 99232 SBSQ HOSP IP/OBS MODERATE 35: CPT | Performed by: INTERNAL MEDICINE

## 2021-04-27 PROCEDURE — 94760 N-INVAS EAR/PLS OXIMETRY 1: CPT

## 2021-04-27 RX ORDER — MELATONIN
2000 DAILY
Qty: 14 TABLET | Refills: 0 | Status: SHIPPED | OUTPATIENT
Start: 2021-04-28

## 2021-04-27 RX ORDER — ATORVASTATIN CALCIUM 40 MG/1
40 TABLET, FILM COATED ORAL
Qty: 4 TABLET | Refills: 0 | Status: SHIPPED | OUTPATIENT
Start: 2021-04-27 | End: 2021-05-01

## 2021-04-27 RX ORDER — LISINOPRIL 10 MG/1
10 TABLET ORAL DAILY
Qty: 30 TABLET | Refills: 0 | Status: SHIPPED | OUTPATIENT
Start: 2021-04-28

## 2021-04-27 RX ORDER — FAMOTIDINE 20 MG/1
20 TABLET, FILM COATED ORAL DAILY
Qty: 14 TABLET | Refills: 0 | Status: SHIPPED | OUTPATIENT
Start: 2021-04-28 | End: 2021-05-12

## 2021-04-27 RX ORDER — BENZONATATE 100 MG/1
100 CAPSULE ORAL 3 TIMES DAILY PRN
Qty: 20 CAPSULE | Refills: 0 | Status: SHIPPED | OUTPATIENT
Start: 2021-04-27

## 2021-04-27 RX ORDER — INSULIN GLARGINE 100 [IU]/ML
10 INJECTION, SOLUTION SUBCUTANEOUS
Qty: 15 ML | Refills: 0 | Status: SHIPPED | OUTPATIENT
Start: 2021-04-27

## 2021-04-27 RX ORDER — GUAIFENESIN 600 MG
600 TABLET, EXTENDED RELEASE 12 HR ORAL EVERY 12 HOURS SCHEDULED
Qty: 28 TABLET | Refills: 0 | Status: SHIPPED | OUTPATIENT
Start: 2021-04-27 | End: 2021-05-11

## 2021-04-27 RX ORDER — INSULIN GLARGINE 100 [IU]/ML
10 INJECTION, SOLUTION SUBCUTANEOUS
Qty: 15 ML | Refills: 0 | Status: SHIPPED | OUTPATIENT
Start: 2021-04-27 | End: 2021-04-27 | Stop reason: HOSPADM

## 2021-04-27 RX ADMIN — LISINOPRIL 10 MG: 10 TABLET ORAL at 08:17

## 2021-04-27 RX ADMIN — CILOSTAZOL 50 MG: 50 TABLET ORAL at 08:17

## 2021-04-27 RX ADMIN — INSULIN LISPRO 6 UNITS: 100 INJECTION, SOLUTION INTRAVENOUS; SUBCUTANEOUS at 12:33

## 2021-04-27 RX ADMIN — GUAIFENESIN 600 MG: 600 TABLET ORAL at 08:16

## 2021-04-27 RX ADMIN — METOPROLOL TARTRATE 25 MG: 25 TABLET, FILM COATED ORAL at 08:16

## 2021-04-27 RX ADMIN — INSULIN LISPRO 4 UNITS: 100 INJECTION, SOLUTION INTRAVENOUS; SUBCUTANEOUS at 08:17

## 2021-04-27 RX ADMIN — Medication 1 TABLET: at 08:18

## 2021-04-27 RX ADMIN — FAMOTIDINE 20 MG: 20 TABLET ORAL at 08:17

## 2021-04-27 RX ADMIN — ENOXAPARIN SODIUM 30 MG: 30 INJECTION SUBCUTANEOUS at 08:18

## 2021-04-27 RX ADMIN — Medication 2000 UNITS: at 08:17

## 2021-04-27 NOTE — ASSESSMENT & PLAN NOTE
· Came in due to generalized fatigue over the past week  Patient had a COVID test outpatient but has been waiting for the results  · COVID positive in the ED  · 84% on room air, initially placed on 2 L, escalating O2 requirements currently on 9L midflow  Evaluated for home oxygen needs, qualify for 2 L  ·   Ordered Actemra 4/19  · Cardiac markers   · Troponin negative  · CK normal 41  · BNP 1805  · Inflammatory markers   · CRP:  157 5 > 34 7  · D-dimer elevated at 1 69 > 0 50  · Ferritin 308 > pending  · In the ED patient received ceftriaxone, doxycycline and Decadron  · Continue vitamin cocktail, Pepcid  · Completed remdesivir  · Continue patient on atorvastatin 40 mg daily  · CXR 4/21 reveals slight progression of pneumonia, repeat 4/22 overall stable  · Repeat procalcitonin normal  · Also recommended for IV diuresis per pulm previously, good response to Lasix  · Appreciate ongoing pulmonology recommendations  · Oxygen requirement continues to improve, will send patient home with home oxygen

## 2021-04-27 NOTE — ASSESSMENT & PLAN NOTE
Lab Results   Component Value Date    HGBA1C 8 8 (H) 04/17/2021       Recent Labs     04/26/21  1804 04/26/21  2205 04/27/21  0732 04/27/21  1231   POCGLU 194* 209* 232* 254*       Blood Sugar Average: Last 72 hrs:  (P) 214  · Patient reports she takes oral diabetes medications at home, She is not sure which ones or doses  Denies current use of insulin or other injection medications  · Continue SSI  · Added 10u Lantus qhs, 4/23 had episode of hypoglycemia and Humalog decreased to 3 units t i d   Now with persistent hyperglycemia again will increase to 5 units t i d   · Recommend Resuming oral diabetic medications on discharge, will continue 10 units Lantus, patient will not be discharged on steroids, anticipate blood sugars will decrease, outpatient follow-up with PCP  · Monitor blood sugars at home on discharge

## 2021-04-27 NOTE — DISCHARGE INSTRUCTIONS
[address]    Your healthcare provider and/or public health staff have evaluated you and have determined that you do not need to remain in the hospital at this time  At this time you can be isolated at home where you will be monitored by staff from your local or state health department  You should carefully follow the prevention and isolation steps below until a healthcare provider or local or state health department says that you can return to your normal activities  Stay home except to get medical care    People who are mildly ill with COVID-19 are able to isolate at home during their illness  You should restrict activities outside your home, except for getting medical care  Do not go to work, school, or public areas  Avoid using public transportation, ride-sharing, or taxis  Separate yourself from other people and animals in your home    People: As much as possible, you should stay in a specific room and away from other people in your home  Also, you should use a separate bathroom, if available  Animals: You should restrict contact with pets and other animals while you are sick with COVID-19, just like you would around other people  Although there have not been reports of pets or other animals becoming sick with COVID-19, it is still recommended that people sick with COVID-19 limit contact with animals until more information is known about the virus  When possible, have another member of your household care for your animals while you are sick  If you are sick with COVID-19, avoid contact with your pet, including petting, snuggling, being kissed or licked, and sharing food  If you must care for your pet or be around animals while you are sick, wash your hands before and after you interact with pets and wear a facemask  See COVID-19 and Animals for more information      Call ahead before visiting your doctor    If you have a medical appointment, call the healthcare provider and tell them that you have or may have COVID-19  This will help the healthcare providers office take steps to keep other people from getting infected or exposed  Wear a facemask    You should wear a facemask when you are around other people (e g , sharing a room or vehicle) or pets and before you enter a healthcare providers office  If you are not able to wear a facemask (for example, because it causes trouble breathing), then people who live with you should not stay in the same room with you, or they should wear a facemask if they enter your room  Cover your coughs and sneezes    Cover your mouth and nose with a tissue when you cough or sneeze  Throw used tissues in a lined trash can  Immediately wash your hands with soap and water for at least 20 seconds or, if soap and water are not available, clean your hands with an alcohol-based hand  that contains at least 60% alcohol  Clean your hands often    Wash your hands often with soap and water for at least 20 seconds, especially after blowing your nose, coughing, or sneezing; going to the bathroom; and before eating or preparing food  If soap and water are not readily available, use an alcohol-based hand  with at least 60% alcohol, covering all surfaces of your hands and rubbing them together until they feel dry  Soap and water are the best option if hands are visibly dirty  Avoid touching your eyes, nose, and mouth with unwashed hands  Avoid sharing personal household items    You should not share dishes, drinking glasses, cups, eating utensils, towels, or bedding with other people or pets in your home  After using these items, they should be washed thoroughly with soap and water  Clean all high-touch surfaces everyday    High touch surfaces include counters, tabletops, doorknobs, bathroom fixtures, toilets, phones, keyboards, tablets, and bedside tables  Also, clean any surfaces that may have blood, stool, or body fluids on them   Use a household cleaning spray or wipe, according to the label instructions  Labels contain instructions for safe and effective use of the cleaning product including precautions you should take when applying the product, such as wearing gloves and making sure you have good ventilation during use of the product  Monitor your symptoms    Seek prompt medical attention if your illness is worsening (e g , difficulty breathing)  Before seeking care, call your healthcare provider and tell them that you have, or are being evaluated for, COVID-19  Put on a facemask before you enter the facility  These steps will help the healthcare providers office to keep other people in the office or waiting room from getting infected or exposed  Ask your healthcare provider to call the local or [facility] health department  Persons who are placed under active monitoring or facilitated self-monitoring should follow instructions provided by their local health department or occupational health professionals, as appropriate  If you have a medical emergency and need to call 911, notify the dispatch personnel that you have, or are being evaluated for COVID-19  If possible, put on a facemask before emergency medical services arrive      Discontinuing home isolation    Patients with confirmed COVID-19 should remain under home isolation precautions until the following conditions are met:   - They have had no fever for at least 24 hours (that is one full day of no fever without the use medicine that reduces fevers)  AND  - other symptoms have improved (for example, when their cough or shortness of breath have improved)  AND  - If had mild or moderate illness, at least 10 days have passed since their symptoms first appeared or if severe illness (needed oxygen) or immunosuppressed, at least 20 days have passed since symptoms first appeared  Patients with confirmed COVID-19 should also notify close contacts (including their workplace) and ask that they self-quarantine  Currently, close contact is defined as being within 6 feet for 15 minutes or more from the period 24 hours starting 48 hours before symptom onset to the time at which the patient went into isolation  Close contacts of patients diagnosed with COVID-19 should be instructed by the patient to self-quarantine for 14 days from the last time of their last contact with the patient  Source: [employer]    Insulin Glargine (By injection)   Insulin Glargine, Recombinant (IN-molina-bakari GLAR-jeen, ree-KOM-bi-nant)  Treats diabetes  Brand Name(s): [employer], Lantus, Lantus Novaplus, Lantus SoloStar, Lantus SoloStar Pen, Lantus SoloStar Pen Novaplus, Semglee, Semglee Pen, Toujeo, Toujeo Max   There may be other brand names for this medicine  When This Medicine Should Not Be Used: This medicine is not right for everyone  Do not use it if you had an allergic reaction to insulin glargine, or during episodes of hypoglycemia (low blood sugar)  How to Use This Medicine:   Injectable  · Your doctor will prescribe your exact dose and tell you how often it should be given  This medicine is given as a shot under your skin  It is usually given in the stomach, thigh, buttocks, or upper arm  · You may be taught how to give your medicine at home  Make sure you understand all instructions before giving yourself an injection  Do not use more medicine or use it more often than your doctor tells you to  · If you use insulin once a day, it is best to use it at about the same time every day  · Always double-check both the concentration (strength) of your insulin and your dose  Concentration and dose are not the same  The dose is how many units of insulin you will use  The concentration tells how many units of insulin are in each milliliter (mL), such as 100 units/mL (U-100), but this does not mean you will use 100 units at a time    · Read and follow the patient instructions that come with this medicine  Talk to your doctor or pharmacist if you have any questions  · This medicine should look clear and colorless before you use it  Do not shake the vial  Do not mix this medicine with any other insulin or with water  · You will be shown the body areas where this shot can be given  Use a different body area each time you give yourself a shot  Keep track of where you give each shot to make sure you rotate body areas  Do not use the exact same spot for each injection  · Use a new needle and syringe each time you inject your medicine  If you use a syringe, use only the kind that is made for insulin injections  Some insulins must be given with a specific type of syringe or needle  Ask your pharmacist if you are not sure which one to use  · Always check the label before use, to make sure you have the correct type of insulin  Do not change the brand, type, or concentration unless your doctor tells you to  If you use a pump or other device, make sure the insulin is made for that device  · Missed dose: Take a dose as soon as you remember  If it is almost time for your next dose, wait until then and take a regular dose  Do not take extra medicine to make up for a missed dose  · Unopened medicine: Store the vials, cartridges, Max [employer], and SoloStar® pen in the refrigerator  Protect from light  Do not freeze  · Opened medicine:   ? Vials: Store in the refrigerator or at room temperature in a cool place, away from sunlight and heat  Use within 28 days  ? Cartridge, Max [employer] or [employer]: Store at room temperature, away from direct heat and light  Do not refrigerate  Throw away any opened cartridge, Semglee pen, or Lantus® SoloStar® pen after 28 days  Throw away any opened Toujeo® Max SoloStar® or SoloStar® pen after 56 days  · Throw away used needles in a hard, closed container that the needles cannot poke through   Keep this container away from children and pets  Drugs and Foods to Avoid:   Ask your doctor or pharmacist before using any other medicine, including over-the-counter medicines, vitamins, and herbal products  · Some medicines can change the amount of insulin you need to use and make it harder for you to control your diabetes  Tell your doctor about all other medicines that you are using  · Do not drink alcohol while you are using this medicine  Warnings While Using This Medicine:   · Tell your doctor if you are pregnant or breastfeeding, or if you have kidney disease, liver disease, heart disease (including heart failure), or ketoacidosis (high ketones and acid in the blood)  · This medicine may cause the following problems:  ? Low blood sugar  ? Fluid retention or heart failure (when used with thiazolidinedione [TZD] medicine)  · Never share insulin pens, needles, or cartridges with anyone  Sharing these can pass hepatitis viruses, HIV, or other illnesses from one person to another  · This medicine may make you dizzy or drowsy  Do not drive or do anything else that could be dangerous until you know how this medicine affects you  · Your doctor will do lab tests at regular visits to check on the effects of this medicine  Keep all appointments  · Keep all medicine out of the reach of children  Never share your medicine with anyone    Possible Side Effects While Using This Medicine:   Call your doctor right away if you notice any of these side effects:  · Allergic reaction: Itching or hives, swelling in your face or hands, swelling or tingling in your mouth or throat, chest tightness, trouble breathing  · Dry mouth, increased thirst, muscle cramps, nausea, vomiting, uneven heartbeat  · Fever, chills, cough, stuffy or runny nose, sore throat, and body aches  · Rapid weight gain, swelling in your hands, ankles, or feet, trouble breathing, tiredness  · Shaking, trembling, sweating, fast or pounding heartbeat, lightheadedness, hunger, confusion  If you notice these less serious side effects, talk with your doctor:   · Redness, pain, itching, swelling, or any skin changes where the shot was given  · Skin rash or itching  If you notice other side effects that you think are caused by this medicine, tell your doctor  Call your doctor for medical advice about side effects  You may report side effects to FDA at 7-776-FDA-9515  © Copyright [employer] 2021 Information is for End User's use only and may not be sold, redistributed or otherwise used for commercial purposes  The above information is an  only  It is not intended as medical advice for individual conditions or treatments  Talk to your doctor, nurse or pharmacist before following any medical regimen to see if it is safe and effective for you  Insulin Pens   WHAT YOU NEED TO KNOW:   An insulin pen is a device used to inject insulin  There are many types of insulin pens available  Most are disposable  A disposable pen contains a prefilled amount of insulin  When this type of pen is empty, it is thrown away  A few types are reusable pens  A reusable pen contains an insulin cartridge that can be replaced  When the cartridge is empty, it is thrown away  Then a new, prefilled cartridge is put in  Always use a new needle every time you inject insulin  DISCHARGE INSTRUCTIONS:   Call your doctor if:   · You feel or see hard lumps in your skin where you inject your insulin  · You think you gave yourself too much or not enough insulin  · Your injections are very painful  · You see blood or clear fluid on your injection site more than once after you inject insulin  · You have questions about how to give the injection  · You cannot afford to buy your diabetes supplies  · You have questions or concerns about your condition or care  How to get the insulin ready to use:   · Check the label and color of the insulin    Check that you have the correct type and strength of insulin  Also check the expiration date on the label  Use a new cartridge or pen if the expiration date has passed  Use a new cartridge or pen if the insulin does not look right  Follow the pen 's instructions for inserting a new cartridge into a reusable pen  · Mix cloudy insulin  Sometimes cloudy insulin separates  The insulin will remain cloudy after mixing, but it will all be cloudy  Gently roll the pen back and forth between the palms of your hands  Repeat this 10 times  Do not shake the pen  This can make the insulin clump together  Next, gently tip the pen up and down 10 times  Do not use the insulin if there are clumps in it after you mix it  How to get the pen ready to use:  No matter what type of pen you use, the steps for using it are the same  · Remove a new pen from the refrigerator 30 minutes before you use it  Insulin should be injected at room temperature  · Wash your hands  Use soap and water or an alcohol-based hand rub  This will help decrease your risk for an infection  · Remove the cap from the pen  Wipe the needle attachment area with an alcohol swab  · Attach a new needle to the pen  Remove the tab from the needle  Do not remove the outer cap on the needle  Push the needle straight onto the pen  Turn the needle clockwise until you cannot turn it more  Make sure the needle is straight  · Remove the needle caps  Remove the outer cap and save it  Remove the inner cap and throw it away  · Remove air from the pen  Air may cause pain during injection  Turn the dial to 2 units  For most insulin pens, you will hear a click for each unit of insulin that you dial  Hold the pen and point the needle up  Gently tap the pen to move air bubbles to the top of the pen  Press the injection button  You should see a drop of insulin on the tip of the pen  If you do not see a drop, change the needle and repeat this step   If you do not see a drop after you repeat this step 3 times, use a new pen  · Select the correct dose on the pen  Turn the dial to the number of units you need to inject  The pointer on the side of your pen should line up with your dose  The dial can be turned in either direction to choose the correct dose  You cannot choose a dose larger than the number of units left in the pen  Use another pen if there is not enough insulin  Instead you can inject part of your dose with the insulin that is left  Next, you can use a new pen to inject the rest of your dose  Where to inject insulin:   · You can inject insulin into your abdomen, upper arm, buttocks, hip, and the front or side of the thigh  Insulin works fastest when it is injected into the abdomen  Do not inject insulin within 2 inches of your belly button or into any stretch marks  · Do not inject insulin into areas where you have a wound or bruising  Insulin injected into wounds or bruises may not get into your body correctly  Do not inject insulin through your clothes  Injecting through clothes can contaminate the needle and may cause an infection  · Use a different area within the site each time you inject insulin  For example, inject insulin into different areas in your abdomen  Insulin injected into the same area can cause lumps, swelling, or thickened skin  How to inject insulin with a pen:   · Clean the skin where you will inject the insulin  You can use an alcohol pad or a cotton swab dipped in alcohol  Let the area dry before you inject  This will decrease pain  · Grab a fold of your skin  Gently pinch the skin and fat between your thumb and first finger  · Insert the needle straight into your skin  Do not hold the syringe at an angle  Make sure the needle is all the way into the skin  Let go of the pinched tissue  · Push the injection button to inject the insulin  Continue to press on the injection button  Keep the needle in place for 10 seconds       · Pull the needle out  Replace the needle cap  Press on your injection site for 5 to 10 seconds  Do not rub  This will keep insulin from leaking out  · Remove the needle from the pen  Twist the capped needle counter clockwise  Place the needle in a heavy-duty laundry detergent bottle or a metal coffee can  The container should have a cap or lid that fits securely  · Replace the pen cap  Store the pen as directed  What to do with used needles:  Ask your local waste authority if you need to follow certain rules for getting rid of your needles  Bring your used needles home with you when you travel  Pack them in a plastic or metal container with a secure lid  How to store an insulin pen:  Do not store your pen with a needle attached  Follow the storage directions on the label or package insert that came with the insulin  Unopened pens can be stored in the refrigerator until you are ready to use them  Most insulin pens can be opened and kept at room temperature  Store your pen in a cool, dry place  Do not keep your pen in direct sunlight or in your car  Throw away pens that have been frozen or exposed to temperatures above 85°F (30°C)  If you travel, keep the pen in a cool pack  Follow up with your healthcare provider as directed:  Write down your questions so you remember to ask them during your visits  © Copyright [address] Information is for End User's use only and may not be sold, redistributed or otherwise used for commercial purposes  All illustrations and images included in CareNotes® are the copyrighted property of [employer]  or [facility] [employer]   The above information is an  only  It is not intended as medical advice for individual conditions or treatments  Talk to your doctor, nurse or pharmacist before following any medical regimen to see if it is safe and effective for you

## 2021-04-27 NOTE — ASSESSMENT & PLAN NOTE
· In the setting of COVID-19  · Greatly improving, originally had been weaned to room air however did have a SpO2 of 88% and therefore was put back on 1 L nasal cannula with improvement to 95%  · Home O2: patient does qualify  · Wean as able to maintain SpO2 > 90%

## 2021-04-27 NOTE — PROGRESS NOTES
Progress Note - Pulmonary   Desi Torres 79 y o  female MRN: [id number]  Unit/Bed#: -01 Encounter: [id number]  Code Status: Level 1 - Full Code    Darling Betancourt is a 79 y o  Past Medical History:   Diagnosis Date    Diabetes mellitus ([address] )     Obese        Assessment/Plan:   * Pneumonia due to COVID-19 virus  Assessment & Plan  ·  She continues to do well  Oxygen is down to 1 L NC  ·  Continue Decadron 6mg daily to complete 10 days total   ·  Completed Remdesivir  ·  Continue Vimatin C,D and Zinc  ·  Continue Pepcid, Lipitor    Acute respiratory failure with hypoxia (HCC)  Assessment & Plan  Improving 2/2 COVID     Obesity (BMI 30-39  9)  Assessment & Plan  Independent risk factor for COVID-19      D/C and Follow up Needs:   Discussed with the primary team    Subjective: Pt seen and examined at bedside    Chief Complaint: No new complaints     Vitals:   Temp:  [97 5 °F (36 4 °C)-98 2 °F (36 8 °C)] 97 5 °F (36 4 °C)  HR:  [49-62] 62  BP: (142)/(57-58) 142/58  Weight (last 2 days)     None        Vitals:    21 0714 21 0723 21 0816 21 0935   BP:       Pulse:   62    Resp:       Temp: 97 5 °F (36 4 °C)      TempSrc:       SpO2:  95%  97%   Weight:       Height:         Temp (24hrs), Av 9 °F (36 6 °C), Min:97 5 °F (36 4 °C), Max:98 2 °F (36 8 °C)  Current: Temperature: 97 5 °F (36 4 °C)        SpO2: SpO2: 97 %, SpO2 Activity: SpO2 Activity: (sitting on side of bed), SpO2 Device: O2 Device: Nasal cannula, Capnography:    O2 Flow Rate (L/min): 6 L/min         Nutrition:        Diet Orders   (From admission, onward)             Start     Ordered    21 1332  Dietary nutrition supplements  Once     Question Answer Comment   Select Supplement: [employer], Lunch, Dinner        21 1331    21 2348  Diet Sukumar/CHO Controlled; Consistent Carbohydrate Diet Level 2 (5 carb servings/75 grams CHO/meal)  Diet effective now     Question Answer Comment   Diet Type Sukumar/CHO Controlled    Sukumar/CHO Controlled Consistent Carbohydrate Diet Level 2 (5 carb servings/75 grams CHO/meal)    RD to adjust diet per protocol? Yes        04/17/21 8102                Ins/Outs:   I/O       04/24 0701 - 04/25 0700 04/25 0701 - 04/26 0700 04/26 0701 - 04/27 0700    P  O  420 840     Total Intake(mL/kg) 420 (5 4) 840 (10 9)     Urine (mL/kg/hr)  650 (0 4)     Stool  0     Total Output  650     Net +420 +190            Unmeasured Stool Occurrence  1 x           Lines/Drains:  Invasive Devices     Peripheral Intravenous Line            Peripheral IV 04/25/21 Left Forearm 1 day                 Active medications:  Scheduled Meds:  Current Facility-Administered Medications   Medication Dose Route Frequency Provider Last Rate    acetaminophen  650 mg Oral Q6H PRN Fozia Perez PA-C      atorvastatin  40 mg Oral HS Fozia Preez PA-C      benzonatate  100 mg Oral TID PRN Derik Smith MD      cholecalciferol  2,000 Units Oral Daily Fozia Perez PA-C      cilostazol  50 mg Oral BID Derik Smith MD      enoxaparin  30 mg Subcutaneous Q12H [facility] Fozia Perez PA-C      famotidine  20 mg Oral Daily Fozia Perez PA-C      guaiFENesin  600 mg Oral Q12H [facility] Derik Smith MD      insulin glargine  10 Units Subcutaneous HS Kamilla Ledezma MD      insulin lispro  1-6 Units Subcutaneous HS Cinthya PÉREZ PA-C      insulin lispro  2-12 Units Subcutaneous TID AC Cinthya PÉREZ PA-C      insulin lispro  5 Units Subcutaneous TID With Meals Kamilla Ledezma MD      lisinopril  10 mg Oral Daily [address] DINH PÉREZ      metoprolol tartrate  25 mg Oral Q12H [facility] Kamilla Ledezma MD      multivitamin-minerals  1 tablet Oral Daily Brook Marquez PA-C      ondansetron  4 mg Intravenous Q6H PRN Brook Marquez PA-C       PRN Meds:acetaminophen, 650 mg, Q6H PRN  benzonatate, 100 mg, TID PRN  ondansetron, 4 mg, Q6H PRN      ____________________________________________________________________    Physical Exam  Constitutional:       Appearance: Normal appearance  HENT:      Head: Normocephalic and atraumatic  Nose: No congestion or rhinorrhea  Mouth/Throat:      Mouth: Mucous membranes are moist       Pharynx: Oropharynx is clear  No oropharyngeal exudate  Eyes:      General: No scleral icterus  Right eye: No discharge  Left eye: No discharge  Conjunctiva/sclera: Conjunctivae normal    Neck:      Musculoskeletal: Normal range of motion and neck supple  Cardiovascular:      Rate and Rhythm: Normal rate and regular rhythm  No extrasystoles are present  Pulses: Normal pulses  No decreased pulses  Heart sounds: Normal heart sounds  No gallop  Pulmonary:      Effort: Pulmonary effort is normal       Breath sounds: No stridor  Decreased breath sounds present  No wheezing or rhonchi  Comments: Decreased breath sound bilaterally limited air entry  Abdominal:      General: Abdomen is flat  Bowel sounds are normal       Palpations: Abdomen is soft  Musculoskeletal:         General: No swelling or tenderness  Skin:     General: Skin is warm  Coloration: Skin is not pale  Findings: No erythema  Neurological:      General: No focal deficit present  Mental Status: She is alert and oriented to person, place, and time  Mental status is at baseline     Psychiatric:         Mood and Affect: Mood normal          Behavior: Behavior normal        ____________________________________________________________________    Invasive/non-invasive ventilation settings   Respiratory    Lab Data (Last 4 hours)    None         O2/Vent Data (Last 4 hours)    None                Laboratory and Diagnostics:  Results from last 7 days   Lab Units 04/25/21  0505 04/24/21  0458 04/23/21  0539 04/22/21  0451   WBC Thousand/uL 4 84 4 97 4 64 3 79*   HEMOGLOBIN g/dL 11 8 12 2 12 6 12 1   HEMATOCRIT % 36 0 36 9 37 8 35 8   PLATELETS Thousands/uL 498* 521* 521* 454*   BANDS PCT %  --   --   --  1   MONO PCT % 0*  --   --  4     Results from last 7 days   Lab Units 04/26/21  0534 04/25/21  0505 04/24/21  0458 04/23/21  0539 04/22/21  0451 04/21/21  0637   SODIUM mmol/L 140 138 141 138 139 138   POTASSIUM mmol/L 4 2 4 0 4 1 4 1 3 9 4 1   CHLORIDE mmol/L 102 102 105 103 104 105   CO2 mmol/L 30 26 28 22 26 22   ANION GAP mmol/L 8 10 8 13 9 11   BUN mg/dL 28* 24 20 20 22 23   CREATININE mg/dL 0 93 0 91 0 78 0 92 0 87 0 78   CALCIUM mg/dL 8 2* 8 5 8 2* 8 3 7 9* 8 2*   GLUCOSE RANDOM mg/dL 261* 279* 211* 230* 213* 209*   ALT U/L  --  56 52 49 36 30   AST U/L  --  32 37 37 33 24   ALK PHOS U/L  --  56 60 63 61 54   ALBUMIN g/dL  --  2 2* 2 3* 2 4* 2 4* 2 2*   TOTAL BILIRUBIN mg/dL  --  0 50 0 60 0 50 0 50 0 40                       ABG:    VBG:    Results from last 7 days   Lab Units 04/22/21  0451 04/21/21  0530   PROCALCITONIN ng/ml <0 05 <0 05       Micro          Reviewed images on PACS myself  Imaging:   XR chest portable   Final Result by Keith Vogt MD (04/22 6679)      No definite change in bilateral groundglass opacity, slightly greater on the right, due to Covid-19  Workstation performed: [id number]         XR chest portable   Final Result by Sylwia Yap MD (04/21 5806)      Patchy multifocal parenchymal airspace opacity is slightly progressed when compared to prior examination consistent with subtle progression of viral pneumonia due to COVID-19 infection  Workstation performed: [id number]         XR chest portable   Final Result by Shahab Horton MD (04/18 1214)      Hazy infiltrate throughout the right lung most prominent in right parahilar position  Probable left basilar infiltrate  Findings are compatible with Covid pneumonia                    Workstation performed: [id number]             Micro:   Lab Results   Component Value Date    BLOODCX No Growth After 5 Days  04/17/2021    BLOODCX No Growth After 5 Days   04/17/2021            Invalid input(s): Kermit Bassett

## 2021-04-27 NOTE — RESPIRATORY THERAPY NOTE
RESPIRATORY THERAPY NOTE         Home Oxygen Qualifying Test       Patient name: Christoph Manzano        : 1940   Date of Test:  2021  Diagnosis:      Home Oxygen Test:    Medicare Guidelines require item(s) 1-5 on all ambulatory patients or 1 and 2 on non-ambulatory patients  1   Baseline SPO2 on Room Air at rest 92 %  2   SPO2 during exercise on Room Air 83 %  During exercise monitor SpO2  If SPO2 increases >=89% with ambulation do not add supplemental             oxygen  If <= 88% on room air add O2 via NC and titrate patient  Patient must be ambulated with O2 and titrated to > 88% with exertion  3   SPO2 on Oxygen at rest 94 % 2 lpm     4   SPO2 during exercise on Oxygen  94% a liter flow of 2 lpm     5   Exercise performed:          walking  [x]  Supplemental Home Oxygen is indicated  []  Client does not qualify for home oxygen  Respiratory Additional Notes- Pt walked a distance of 120 feet  Pt saturation initially 92% on room air  When pt ambulated, saturation dropped to 83%  Oxygen via NC @ 2L/M was administered and pt saturation increased to 94%   Pt left in satisfactory condition on NC 2L/M  RN notified      Siddharth Carter, RT

## 2021-04-27 NOTE — NURSING NOTE
Patient currently on 1L O2 at 93%  She was taken off O2 for evaluation and ambulated approx 25 ft on RA with O2 saturation of 85%

## 2021-04-27 NOTE — PLAN OF CARE
Problem: Potential for Falls  Goal: Patient will remain free of falls  Description: INTERVENTIONS:  - Assess patient frequently for physical needs  -  Identify cognitive and physical deficits and behaviors that affect risk of falls  -  [place] fall precautions as indicated by assessment   - Educate patient/family on patient safety including physical limitations  - Instruct patient to call for assistance with activity based on assessment  - Modify environment to reduce risk of injury  - Consider OT/PT consult to assist with strengthening/mobility  Outcome: Progressing     Problem: Nutrition/Hydration-ADULT  Goal: Nutrient/Hydration intake appropriate for improving, restoring or maintaining nutritional needs  Description: Monitor and assess patient's nutrition/hydration status for malnutrition  Collaborate with interdisciplinary team and initiate plan and interventions as ordered  Monitor patient's weight and dietary intake as ordered or per policy  Utilize nutrition screening tool and intervene as necessary  Determine patient's food preferences and provide high-protein, high-caloric foods as appropriate       INTERVENTIONS:  - Monitor oral intake, urinary output, labs, and treatment plans  - Assess nutrition and hydration status and recommend course of action  - Evaluate amount of meals eaten  - Assist patient with eating if necessary   - Allow adequate time for meals  - Recommend/ encourage appropriate diets, oral nutritional supplements, and vitamin/mineral supplements  - Order, calculate, and assess calorie counts as needed  - Recommend, monitor, and adjust tube feedings and TPN/PPN based on assessed needs  - Assess need for intravenous fluids  - Provide specific nutrition/hydration education as appropriate  - Include patient/family/caregiver in decisions related to nutrition  Outcome: Progressing     Problem: PAIN - ADULT  Goal: Verbalizes/displays adequate comfort level or baseline comfort level  Description: Interventions:  - Encourage patient to monitor pain and request assistance  - Assess pain using appropriate pain scale  - Administer analgesics based on type and severity of pain and evaluate response  - Implement non-pharmacological measures as appropriate and evaluate response  - Consider cultural and social influences on pain and pain management  - Notify physician/advanced practitioner if interventions unsuccessful or patient reports new pain  Outcome: Progressing     Problem: INFECTION - ADULT  Goal: Absence or prevention of progression during hospitalization  Description: INTERVENTIONS:  - Assess and monitor for signs and symptoms of infection  - Monitor lab/diagnostic results  - Monitor all insertion sites, i e  indwelling lines, tubes, and drains  - Monitor endotracheal if appropriate and nasal secretions for changes in amount and color  - [place] appropriate cooling/warming therapies per order  - Administer medications as ordered  - Instruct and encourage patient and family to use good hand hygiene technique  - Identify and instruct in appropriate isolation precautions for identified infection/condition  Outcome: Progressing     Problem: SAFETY ADULT  Goal: Patient will remain free of falls  Description: INTERVENTIONS:  - Assess patient frequently for physical needs  -  Identify cognitive and physical deficits and behaviors that affect risk of falls    -  [place] fall precautions as indicated by assessment   - Educate patient/family on patient safety including physical limitations  - Instruct patient to call for assistance with activity based on assessment  - Modify environment to reduce risk of injury  - Consider OT/PT consult to assist with strengthening/mobility  Outcome: Progressing  Goal: Maintain or return to baseline ADL function  Description: INTERVENTIONS:  -  Assess patient's ability to carry out ADLs; assess patient's baseline for ADL function and identify physical deficits which impact ability to perform ADLs (bathing, care of mouth/teeth, toileting, grooming, dressing, etc )  - Assess/evaluate cause of self-care deficits   - Assess range of motion  - Assess patient's mobility; develop plan if impaired  - Assess patient's need for assistive devices and provide as appropriate  - Encourage maximum independence but intervene and supervise when necessary  - Involve family in performance of ADLs  - Assess for home care needs following discharge   - Consider OT consult to assist with ADL evaluation and planning for discharge  - Provide patient education as appropriate  Outcome: Progressing  Goal: Maintain or return mobility status to optimal level  Description: INTERVENTIONS:  - Assess patient's baseline mobility status (ambulation, transfers, stairs, etc )    - Identify cognitive and physical deficits and behaviors that affect mobility  - Identify mobility aids required to assist with transfers and/or ambulation (gait belt, sit-to-stand, lift, walker, cane, etc )  - [place] fall precautions as indicated by assessment  - Record patient progress and toleration of activity level on Mobility SBAR; progress patient to next Phase/Stage  - Instruct patient to call for assistance with activity based on assessment  - Consider rehabilitation consult to assist with strengthening/weightbearing, etc   Outcome: Progressing     Problem: DISCHARGE PLANNING  Goal: Discharge to home or other facility with appropriate resources  Description: INTERVENTIONS:  - Identify barriers to discharge w/patient and caregiver  - Arrange for needed discharge resources and transportation as appropriate  - Identify discharge learning needs (meds, wound care, etc )  - Arrange for interpretive services to assist at discharge as needed  - Refer to Case Management Department for coordinating discharge planning if the patient needs post-hospital services based on physician/advanced practitioner order or complex needs related to functional status, cognitive ability, or social support system  Outcome: Progressing     Problem: Knowledge Deficit  Goal: Patient/family/caregiver demonstrates understanding of disease process, treatment plan, medications, and discharge instructions  Description: Complete learning assessment and assess knowledge base    Interventions:  - Provide teaching at level of understanding  - Provide teaching via preferred learning methods  Outcome: Progressing     Problem: RESPIRATORY - ADULT  Goal: Achieves optimal ventilation and oxygenation  Description: INTERVENTIONS:  - Assess for changes in respiratory status  - Assess for changes in mentation and behavior  - Position to facilitate oxygenation and minimize respiratory effort  - Oxygen administered by appropriate delivery if ordered  - Initiate smoking cessation education as indicated  - Encourage broncho-pulmonary hygiene including cough, deep breathe, Incentive Spirometry  - Assess the need for suctioning and aspirate as needed  - Assess and instruct to report SOB or any respiratory difficulty  - Respiratory Therapy support as indicated  Outcome: Progressing     Problem: MUSCULOSKELETAL - ADULT  Goal: Maintain or return mobility to safest level of function  Description: INTERVENTIONS:  - Assess patient's ability to carry out ADLs; assess patient's baseline for ADL function and identify physical deficits which impact ability to perform ADLs (bathing, care of mouth/teeth, toileting, grooming, dressing, etc )  - Assess/evaluate cause of self-care deficits   - Assess range of motion  - Assess patient's mobility  - Assess patient's need for assistive devices and provide as appropriate  - Encourage maximum independence but intervene and supervise when necessary  - Involve family in performance of ADLs  - Assess for home care needs following discharge   - Consider OT consult to assist with ADL evaluation and planning for discharge  - Provide patient education as appropriate  Outcome: Progressing     Problem: Prexisting or High Potential for Compromised Skin Integrity  Goal: Skin integrity is maintained or improved  Description: INTERVENTIONS:  - Identify patients at risk for skin breakdown  - Assess and monitor skin integrity  - Assess and monitor nutrition and hydration status  - Monitor labs   - Assess for incontinence   - Turn and reposition patient  - Assist with mobility/ambulation  - Relieve pressure over bony prominences  - Avoid friction and shearing  - Provide appropriate hygiene as needed including keeping skin clean and dry  - Evaluate need for skin moisturizer/barrier cream  - Collaborate with interdisciplinary team   - Patient/family teaching  - Consider wound care consult   Outcome: Progressing

## 2021-04-27 NOTE — CASE MANAGEMENT
LOS: 10  Continuing to follow patient  As per [address] patient will need home O2  Spoke with Keila Augustin, patient's daughter and discussed patient's need for Home O2  She is agreeable  List provided and Freedom of Choice given and she has no preference  Referral to [facility] made via parachute and approved for Home O2  Portable O2 given to patient via her nurse and patient is + KEIRA Dodd will transport patient home and will stay with her for a few days  Notified [facility] of discharge and they will follow patient at home

## 2021-05-07 DIAGNOSIS — E66.9 OBESITY (BMI 30-39.9): ICD-10-CM

## 2021-05-07 DIAGNOSIS — U07.1 COVID-19: ICD-10-CM

## 2021-05-07 DIAGNOSIS — I10 ESSENTIAL HYPERTENSION: ICD-10-CM

## 2021-05-07 RX ORDER — LISINOPRIL 10 MG/1
TABLET ORAL
Qty: 30 TABLET | Refills: 0 | OUTPATIENT
Start: 2021-05-07

## 2021-05-07 RX ORDER — ATORVASTATIN CALCIUM 40 MG/1
40 TABLET, FILM COATED ORAL
Qty: 4 TABLET | Refills: 0 | OUTPATIENT
Start: 2021-05-07 | End: 2021-05-11

## 2021-05-18 RX ORDER — INSULIN GLARGINE 100 [IU]/ML
INJECTION, SOLUTION SUBCUTANEOUS
Qty: 15 ML | Refills: 0 | OUTPATIENT
Start: 2021-05-18

## 2021-05-19 DIAGNOSIS — I10 ESSENTIAL HYPERTENSION: ICD-10-CM

## 2021-05-21 DIAGNOSIS — I10 ESSENTIAL HYPERTENSION: ICD-10-CM

## 2021-06-04 DIAGNOSIS — U07.1 COVID-19: ICD-10-CM

## 2021-06-04 DIAGNOSIS — E66.9 OBESITY (BMI 30-39.9): ICD-10-CM

## 2021-06-05 RX ORDER — ATORVASTATIN CALCIUM 40 MG/1
40 TABLET, FILM COATED ORAL
Qty: 4 TABLET | Refills: 0 | OUTPATIENT
Start: 2021-06-05 | End: 2021-06-09

## 2021-09-20 ENCOUNTER — OFFICE VISIT (OUTPATIENT)
Dept: DERMATOLOGY | Facility: CLINIC | Age: 81
End: 2021-09-20
Payer: MEDICARE

## 2021-09-20 VITALS — WEIGHT: 173 LBS | BODY MASS INDEX: 31.64 KG/M2 | TEMPERATURE: 97.1 F

## 2021-09-20 DIAGNOSIS — L57.0 ACTINIC KERATOSIS: Primary | ICD-10-CM

## 2021-09-20 PROCEDURE — 99203 OFFICE O/P NEW LOW 30 MIN: CPT | Performed by: DERMATOLOGY

## 2021-09-20 PROCEDURE — 1123F ACP DISCUSS/DSCN MKR DOCD: CPT | Performed by: DERMATOLOGY

## 2021-09-20 RX ORDER — IMIQUIMOD 12.5 MG/.25G
CREAM TOPICAL
Qty: 24 EACH | Refills: 0 | Status: SHIPPED | OUTPATIENT
Start: 2021-09-20

## 2021-09-20 NOTE — PATIENT INSTRUCTIONS
1  ACTINIC KERATOSIS      Assessment and Plan:  Based on a thorough discussion of this condition and the management approach to it (including a comprehensive discussion of the known risks, side effects and potential benefits of treatment), the patient (family) agrees to implement the following specific plan:     Start using prescribed imiquimod 5% cream  Apply topically to affected areas three nights a week for a total of 4 weeks straight, Take a 4 week break not using, Then Repeat three nights a week for 4 weeks straight   Recommend patient to follow up for check up  Please call office if anything worsens while using topical cream        Actinic keratoses are very common on sites repeatedly exposed to the sun, especially the backs of the hands and the face, most often affecting the ears, nose, cheeks, upper lip, vermilion of the lower lip, temples, forehead and balding scalp  In severely chronically sun-damaged individuals, they may also be found on the upper trunk, upper and lower limbs, and dorsum of feet  We discussed the theoretical premalignant (pre-cancerous) nature and etiology of these growths  We discussed the prevailing notion that actinic keratoses are a reflection of abnormal skin cell development due to DNA damage by short wavelength UVB  They are more likely to appear if the immune function is poor, due to aging, recent sun exposure, predisposing disease or certain drugs  We discussed that the main concern is that actinic keratoses may predispose to squamous cell carcinoma  It is rare for a solitary actinic keratosis to evolve to squamous cell carcinoma (SCC), but the risk of SCC occurring at some stage in a patient with more than 10 actinic keratoses is thought to be about 10 to 15%  A tender, thickened, ulcerated or enlarging actinic keratosis is suspicious of SCC  Actinic keratoses may be prevented by strict sun protection   If already present, keratoses may improve with a very high sun protection factor (50+) broad-spectrum sunscreen applied at least daily to affected areas, year-round  We recommend that UPF-rated clothing and hats and sunglasses be worn whenever possible and that a sunscreen-moisturizer combination product such as Neutrogena Daily Defense be applied at least three times a day  We performed a thorough discussion of treatment options and specific risk/benefits/alternatives including but not limited to medical field treatment with medications such as the following:     Topical field area medications such as 5-fluorouracil or Aldara (specifically, the trouble with long-term compliance, blistering and local skin reaction versus the convenience of at-home therapy and that field therapy gets what is not yet seen)

## 2021-09-20 NOTE — PROGRESS NOTES
[facility] Note     Patient Name: Noe Key  Encounter Date: 09/20/2021     Have you been cared for by a [facility] Dermatologist in the last 3 years and, if so, which one? No    · Have you traveled outside of the [address] [place] [address] in the past 3 months or outside of the [facility] area in the last 2 weeks? No     May we call your Preferred Phone number to discuss your specific medical information? Yes     May we leave a detailed message that includes your specific medical information? Yes      Today's Chief Concerns:   Concern #1:  Red skin lesion     Past Medical History:  Have you personally ever had or currently have any of the following? · Skin cancer (such as Melanoma, Basal Cell Carcinoma, Squamous Cell Carcinoma? (If Yes, please provide more detail)- No  · Eczema: No  · Psoriasis: No  · HIV/AIDS: No  · Hepatitis B or C: No  · Tuberculosis: No  · Systemic Immunosuppression such as Diabetes, Biologic or Immunotherapy, Chemotherapy, Organ Transplantation, Bone Marrow Transplantation (If YES, please provide more detail): YES, diabetes type 2   · Radiation Treatment (If YES, please provide more detail): No  · Any other major medical conditions/concerns? (If Yes, which types)- No    Social History:     What is/was your primary occupation? Retired      What are your hobbies/past-times? n/a    Family History:  Have any of your "first degree relatives" (parent, brother, sister, or child) had any of the following       · Skin cancer such as Melanoma or Merkel Cell Carcinoma or Pancreatic Cancer? No  · Eczema, Asthma, Hay Fever or Seasonal Allergies: No  · Psoriasis or Psoriatic Arthritis: YES,   · Do any other medical conditions seem to run in your family? If Yes, what condition and which relatives?   No    Current Medications:   (please update all dermatological medications before printing patient's AVS!)      Current Outpatient Medications:     cholecalciferol (VITAMIN D3) 1,000 units tablet, Take 2 tablets (2,000 Units total) by mouth daily, Disp: 14 tablet, Rfl: 0    insulin glargine (Basaglar KwikPen) 100 units/mL injection pen, Inject 10 Units under the skin daily at bedtime, Disp: 15 mL, Rfl: 0    metoprolol tartrate (LOPRESSOR) 25 mg tablet, Take 1 tablet (25 mg total) by mouth every 12 (twelve) hours, Disp: 60 tablet, Rfl: 0    apixaban (ELIQUIS) 2 5 mg, Take 1 tablet (2 5 mg total) by mouth 2 (two) times a day for 56 doses, Disp: 56 tablet, Rfl: 0    atorvastatin (LIPITOR) 40 mg tablet, Take 1 tablet (40 mg total) by mouth daily at bedtime for 4 days, Disp: 4 tablet, Rfl: 0    benzonatate (TESSALON PERLES) 100 mg capsule, Take 1 capsule (100 mg total) by mouth 3 (three) times a day as needed for cough (Patient not taking: Reported on 9/20/2021), Disp: 20 capsule, Rfl: 0    cilostazol (PLETAL) 50 mg tablet, Take 50 mg by mouth 2 (two) times a day, Disp: , Rfl:     famotidine (PEPCID) 20 mg tablet, Take 1 tablet (20 mg total) by mouth daily for 14 days, Disp: 14 tablet, Rfl: 0    lisinopril (ZESTRIL) 10 mg tablet, Take 1 tablet (10 mg total) by mouth daily, Disp: 30 tablet, Rfl: 0      Review of Systems:  Have you recently had or currently have any of the following? If YES, what are you doing for the problem? · Fever, chills or unintended weight loss: No  · Sudden loss or change in your vision: No  · Nausea, vomiting or blood in your stool: No  · Painful or swollen joints: No  · Wheezing or cough: No  · Changing mole or non-healing wound: No  · Nosebleeds: No  · Excessive sweating: No  · Easy or prolonged bleeding? No  · Over the last 2 weeks, how often have you been bothered by the following problems? · Taking little interest or pleasure in doing things: 1 - Not at All  · Feeling down, depressed, or hopeless: 1 - Not at All  · Rapid heartbeat with epinephrine:  No    · FEMALES ONLY:    · Are you pregnant or planning to become pregnant?  No  · Are you currently or planning to be nursing or breast feeding? No    · Any known allergies? No Known Allergies      Physical Exam:     Was a chaperone (Derm Clinical Assistant) present throughout the entire Physical Exam? Yes     Did the Dermatology Team specifically  the patient on the importance of a Full Skin Exam to be sure that nothing is missed clinically? Yes}  o Did the patient ultimately request or accept a Full Skin Exam?  Yes  o Did the patient specifically refuse to have the areas "under-the-bra" examined by the Dermatologist? No  o Did the patient specifically refuse to have the areas "under-the-underwear" examined by the Dermatologist? YES    CONSTITUTIONAL:   Vitals:    09/20/21 0954   Temp: (!) 97 1 °F (36 2 °C)   TempSrc: Tympanic   Weight: 78 5 kg (173 lb)         PSYCH: Normal mood and affect  EYES: Normal conjunctiva  ENT: Normal lips and oral mucosa  CARDIOVASCULAR: No edema  RESPIRATORY: Normal respirations  HEME/LYMPH/IMMUNO:  No regional lymphadenopathy except as noted below in "ASSESSMENT AND PLAN BY DIAGNOSIS"    SKIN:  FULL ORGAN SYSTEM EXAM  Hair, Scalp, Ears, Face Normal except as noted below in Assessment   Neck, Cervical Chain Nodes Normal except as noted below in Assessment   Right Arm/Hand/Fingers Normal except as noted below in Assessment   Left Arm/Hand/Fingers Normal except as noted below in Assessment   Back/Spine Normal except as noted below in Assessment        Assessment and Plan by Diagnosis:    History of Present Condition:     Duration:  How long has this been an issue for you?    o  since May of 2021    Location Affected:  Where on the body is this affecting you?    o  nose x2,   Quality:  Is there any bleeding, pain, itch, burning/irritation, or redness associated with the skin lesion? o  itchy in th past redness    Severity:  Describe any bleeding, pain, itch, burning/irritation, or redness on a scale of 1 to 10 (with 10 being the worst)    o  0   Timing: Does this condition seem to be there pretty constantly or do you notice it more at specific times throughout the day?    o  Patient had an oxygen mask for about a month back in May and then she noticed an itchy spot    Context:  Have you ever noticed that this condition seems to be associated with specific activities you do?    o  Patient had an oxygen mask for about a month back in May and then she noticed an itchy spot, spot has not cleared up since than   Modifying Factors:    o Anything that seems to make the condition worse?    -  no  o What have you tried to do to make the condition better?    -  neosporin based cream    Associated Signs and Symptoms:  Does this skin lesion seem to be associated with any of the following:  o  SL AMB DERM SIGNS AND SYMPTOMS: Skin color changes         1  ACTINIC KERATOSIS    Physical Exam:   Anatomic Location Affected:  Nose x2   Morphological Description:  Subtle pink, scaly macules as below  Additional History of Present Condition:  See above for additional history of condition  Assessment and Plan:  Based on a thorough discussion of this condition and the management approach to it (including a comprehensive discussion of the known risks, side effects and potential benefits of treatment), the patient (family) agrees to implement the following specific plan:     Start using prescribed imiquimod 55 cream  Apply topically to affected areas three nights a week for a total of 4 weeks straight, Take a 4 week break not using, Then Repeat three nights a week for 4 weeks straight   Recommend patient to follow up for check up  Please call office if anything worsens while using topical cream        Actinic keratoses are very common on sites repeatedly exposed to the sun, especially the backs of the hands and the face, most often affecting the ears, nose, cheeks, upper lip, vermilion of the lower lip, temples, forehead and balding scalp   In severely chronically sun-damaged individuals, they may also be found on the upper trunk, upper and lower limbs, and dorsum of feet  We discussed the theoretical premalignant (pre-cancerous) nature and etiology of these growths  We discussed the prevailing notion that actinic keratoses are a reflection of abnormal skin cell development due to DNA damage by short wavelength UVB  They are more likely to appear if the immune function is poor, due to aging, recent sun exposure, predisposing disease or certain drugs  We discussed that the main concern is that actinic keratoses may predispose to squamous cell carcinoma  It is rare for a solitary actinic keratosis to evolve to squamous cell carcinoma (SCC), but the risk of SCC occurring at some stage in a patient with more than 10 actinic keratoses is thought to be about 10 to 15%  A tender, thickened, ulcerated or enlarging actinic keratosis is suspicious of SCC  Actinic keratoses may be prevented by strict sun protection  If already present, keratoses may improve with a very high sun protection factor (50+) broad-spectrum sunscreen applied at least daily to affected areas, year-round  We recommend that UPF-rated clothing and hats and sunglasses be worn whenever possible and that a sunscreen-moisturizer combination product such as Neutrogena Daily Defense be applied at least three times a day  We performed a thorough discussion of treatment options and specific risk/benefits/alternatives including but not limited to medical field treatment with medications such as the following:     Topical field area medications such as 5-fluorouracil or Aldara (specifically, the trouble with long-term compliance, blistering and local skin reaction versus the convenience of at-home therapy and that field therapy gets what is not yet seen)        Scribe Attestation    I,:  Bernardo Street MA am acting as a scribe while in the presence of the attending physician :       I,:  Briana Altamirano, MD personally performed the services described in this documentation    as scribed in my presence :

## 2022-09-23 NOTE — ASSESSMENT & PLAN NOTE
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

## 2022-10-12 PROBLEM — U07.1 PNEUMONIA DUE TO COVID-19 VIRUS: Status: RESOLVED | Noted: 2021-04-17 | Resolved: 2022-10-12

## 2022-10-12 PROBLEM — J12.82 PNEUMONIA DUE TO COVID-19 VIRUS: Status: RESOLVED | Noted: 2021-04-17 | Resolved: 2022-10-12

## 2023-04-14 ENCOUNTER — HOSPITAL ENCOUNTER (EMERGENCY)
Facility: HOSPITAL | Age: 83
Discharge: HOME | End: 2023-04-14
Attending: EMERGENCY MEDICINE

## 2023-04-14 VITALS
OXYGEN SATURATION: 96 % | RESPIRATION RATE: 14 BRPM | SYSTOLIC BLOOD PRESSURE: 149 MMHG | HEART RATE: 60 BPM | DIASTOLIC BLOOD PRESSURE: 67 MMHG | TEMPERATURE: 98.1 F

## 2023-04-14 DIAGNOSIS — R50.9 FEVER: Primary | ICD-10-CM

## 2023-04-14 DIAGNOSIS — E87.6 HYPOKALEMIA: ICD-10-CM

## 2023-04-14 DIAGNOSIS — R01.1 HEART MURMUR: ICD-10-CM

## 2023-04-14 LAB
ALBUMIN SERPL BCP-MCNC: 2.7 G/DL (ref 3.5–5)
ALP SERPL-CCNC: 76 U/L (ref 46–116)
ALT SERPL W P-5'-P-CCNC: 21 U/L (ref 12–78)
ANION GAP SERPL CALCULATED.3IONS-SCNC: 6 MMOL/L (ref 4–13)
APTT PPP: 36 SECONDS (ref 23–37)
AST SERPL W P-5'-P-CCNC: 11 U/L (ref 5–45)
BASOPHILS # BLD AUTO: 0.04 THOUSANDS/ÂΜL (ref 0–0.1)
BASOPHILS NFR BLD AUTO: 0 % (ref 0–1)
BILIRUB SERPL-MCNC: 0.51 MG/DL (ref 0.2–1)
BUN SERPL-MCNC: 15 MG/DL (ref 5–25)
CALCIUM ALBUM COR SERPL-MCNC: 9.4 MG/DL (ref 8.3–10.1)
CALCIUM SERPL-MCNC: 8.4 MG/DL (ref 8.3–10.1)
CHLORIDE SERPL-SCNC: 101 MMOL/L (ref 96–108)
CO2 SERPL-SCNC: 26 MMOL/L (ref 21–32)
CREAT SERPL-MCNC: 0.89 MG/DL (ref 0.6–1.3)
EOSINOPHIL # BLD AUTO: 0.02 THOUSAND/ÂΜL (ref 0–0.61)
EOSINOPHIL NFR BLD AUTO: 0 % (ref 0–6)
ERYTHROCYTE [DISTWIDTH] IN BLOOD BY AUTOMATED COUNT: 13.2 % (ref 11.6–15.1)
GFR SERPL CREATININE-BSD FRML MDRD: 59 ML/MIN/1.73SQ M
GLUCOSE SERPL-MCNC: 140 MG/DL (ref 65–140)
HCT VFR BLD AUTO: 37.1 % (ref 34.8–46.1)
HGB BLD-MCNC: 12.1 G/DL (ref 11.5–15.4)
IMM GRANULOCYTES # BLD AUTO: 0.05 THOUSAND/UL (ref 0–0.2)
IMM GRANULOCYTES NFR BLD AUTO: 0 % (ref 0–2)
INR PPP: 1.05 (ref 0.84–1.19)
LACTATE SERPL-SCNC: 3.3 MMOL/L (ref 0.5–2)
LYMPHOCYTES # BLD AUTO: 1.8 THOUSANDS/ÂΜL (ref 0.6–4.47)
LYMPHOCYTES NFR BLD AUTO: 14 % (ref 14–44)
MCH RBC QN AUTO: 28.6 PG (ref 26.8–34.3)
MCHC RBC AUTO-ENTMCNC: 32.6 G/DL (ref 31.4–37.4)
MCV RBC AUTO: 88 FL (ref 82–98)
MONOCYTES # BLD AUTO: 0.58 THOUSAND/ÂΜL (ref 0.17–1.22)
MONOCYTES NFR BLD AUTO: 4 % (ref 4–12)
NEUTROPHILS # BLD AUTO: 10.6 THOUSANDS/ÂΜL (ref 1.85–7.62)
NEUTS SEG NFR BLD AUTO: 82 % (ref 43–75)
NRBC BLD AUTO-RTO: 0 /100 WBCS
PLATELET # BLD AUTO: 421 THOUSANDS/UL (ref 149–390)
PMV BLD AUTO: 8.8 FL (ref 8.9–12.7)
POTASSIUM SERPL-SCNC: 3.2 MMOL/L (ref 3.5–5.3)
PROT SERPL-MCNC: 6.5 G/DL (ref 6.4–8.4)
PROTHROMBIN TIME: 13.9 SECONDS (ref 11.6–14.5)
RBC # BLD AUTO: 4.23 MILLION/UL (ref 3.81–5.12)
SODIUM SERPL-SCNC: 133 MMOL/L (ref 135–147)
TSH SERPL DL<=0.05 MIU/L-ACNC: 2.18 UIU/ML (ref 0.45–4.5)
WBC # BLD AUTO: 13.09 THOUSAND/UL (ref 4.31–10.16)

## 2023-04-14 RX ORDER — SODIUM CHLORIDE, SODIUM GLUCONATE, SODIUM ACETATE, POTASSIUM CHLORIDE, MAGNESIUM CHLORIDE, SODIUM PHOSPHATE, DIBASIC, AND POTASSIUM PHOSPHATE .53; .5; .37; .037; .03; .012; .00082 G/100ML; G/100ML; G/100ML; G/100ML; G/100ML; G/100ML; G/100ML
500 INJECTION, SOLUTION INTRAVENOUS ONCE
Status: COMPLETED | OUTPATIENT
Start: 2023-04-14 | End: 2023-04-14

## 2023-04-14 RX ORDER — POTASSIUM CHLORIDE 20 MEQ/1
40 TABLET, EXTENDED RELEASE ORAL ONCE
Status: COMPLETED | OUTPATIENT
Start: 2023-04-14 | End: 2023-04-14

## 2023-04-14 RX ADMIN — SODIUM CHLORIDE, SODIUM GLUCONATE, SODIUM ACETATE, POTASSIUM CHLORIDE, MAGNESIUM CHLORIDE, SODIUM PHOSPHATE, DIBASIC, AND POTASSIUM PHOSPHATE 500 ML: .53; .5; .37; .037; .03; .012; .00082 INJECTION, SOLUTION INTRAVENOUS at 20:24

## 2023-04-14 RX ADMIN — POTASSIUM CHLORIDE 40 MEQ: 1500 TABLET, EXTENDED RELEASE ORAL at 21:29

## 2023-04-15 LAB
ATRIAL RATE: 66 BPM
P AXIS: 71 DEGREES
PR INTERVAL: 156 MS
QRS AXIS: -47 DEGREES
QRSD INTERVAL: 140 MS
QT INTERVAL: 462 MS
QTC INTERVAL: 484 MS
T WAVE AXIS: 126 DEGREES
VENTRICULAR RATE: 66 BPM

## 2023-04-15 NOTE — ED PROVIDER NOTES
History  Chief Complaint   Patient presents with   • Fever - 9 weeks to 74 years     Last 5 weeks having intermittent fevers with fatigue, coughing, fatigue, and congestion  Today fever was 102  HPI    Prior to Admission Medications   Prescriptions Last Dose Informant Patient Reported? Taking? Vitamin E 45 MG (100 UNIT) CAPS   Yes No   Sig: Take 100 Units by mouth daily   apixaban (ELIQUIS) 2 5 mg   No No   Sig: Take 1 tablet (2 5 mg total) by mouth 2 (two) times a day for 56 doses   Patient not taking: Reported on 4/14/2023   atorvastatin (LIPITOR) 40 mg tablet   No No   Sig: Take 1 tablet (40 mg total) by mouth daily at bedtime for 4 days   Patient not taking: Reported on 4/14/2023   benzonatate (TESSALON PERLES) 100 mg capsule   No No   Sig: Take 1 capsule (100 mg total) by mouth 3 (three) times a day as needed for cough   Patient not taking: Reported on 9/20/2021   cholecalciferol (VITAMIN D3) 1,000 units tablet   No No   Sig: Take 2 tablets (2,000 Units total) by mouth daily   cilostazol (PLETAL) 50 mg tablet   Yes No   Sig: Take 50 mg by mouth 2 (two) times a day   Patient not taking: Reported on 4/14/2023   famotidine (PEPCID) 20 mg tablet   No No   Sig: Take 1 tablet (20 mg total) by mouth daily for 14 days   Patient not taking: Reported on 4/14/2023   imiquimod (ALDARA) 5 % cream   No No   Sig: Apply topically to affected areas of face including nose three nights a week for a total of 4 weeks, discontinue for 4 weeks, then repeat three nights a week for 4 weeks     Patient not taking: Reported on 4/14/2023   insulin glargine (Basaglar KwikPen) 100 units/mL injection pen   No No   Sig: Inject 10 Units under the skin daily at bedtime   lisinopril (ZESTRIL) 10 mg tablet   No No   Sig: Take 1 tablet (10 mg total) by mouth daily   Patient not taking: Reported on 4/14/2023   metoprolol tartrate (LOPRESSOR) 25 mg tablet   No No   Sig: Take 1 tablet (25 mg total) by mouth every 12 (twelve) hours   Patient not taking: Reported on 4/14/2023   pyridoxine (B-6) 100 MG tablet   Yes No   Sig: Take 100 mg by mouth daily   vitamin A 3 MG (91531 UT) capsule   Yes No   Sig: Take 10,000 Units by mouth daily      Facility-Administered Medications: None       Past Medical History:   Diagnosis Date   • Diabetes mellitus ([facility] [address] )    • Obese        History reviewed  No pertinent surgical history  History reviewed  No pertinent family history  I have reviewed and agree with the history as documented  E-Cigarette/Vaping   • E-Cigarette Use Never User      E-Cigarette/Vaping Substances   • Nicotine No    • THC No    • CBD No    • Flavoring No    • Other No    • Unknown No      Social History     Tobacco Use   • Smoking status: Former   • Smokeless tobacco: Never   Vaping Use   • Vaping Use: Never used   Substance Use Topics   • Alcohol use: Yes     Comment: rarely    • Drug use: Not Currently        Review of Systems   Constitutional: Positive for appetite change, fatigue and fever  HENT: Positive for congestion  Respiratory: Negative for cough and shortness of breath  Cardiovascular: Negative for chest pain  Gastrointestinal: Negative for abdominal pain, nausea and vomiting  Genitourinary: Negative  Skin: Negative for rash  All other systems reviewed and are negative  Physical Exam  ED Triage Vitals [04/14/23 1720]   Temperature Pulse Respirations Blood Pressure SpO2   98 1 °F (36 7 °C) 76 18 149/67 95 %      Temp Source Heart Rate Source Patient Position - Orthostatic VS BP Location FiO2 (%)   Oral Monitor -- Right arm --      Pain Score       No Pain             Orthostatic Vital Signs  Vitals:    04/14/23 1720   BP: 149/67   Pulse: 76       Physical Exam  Vitals and nursing note reviewed  Constitutional:       General: She is awake  She is not in acute distress  Appearance: She is not toxic-appearing  HENT:      Head: Normocephalic and atraumatic  Eyes:      General: Vision grossly intact   Gaze aligned appropriately  Cardiovascular:      Rate and Rhythm: Normal rate and regular rhythm  Heart sounds: Murmur heard  Pulmonary:      Effort: Pulmonary effort is normal  No respiratory distress  Breath sounds: Normal breath sounds  Musculoskeletal:      Cervical back: Full passive range of motion without pain and neck supple  Lymphadenopathy:      Cervical: No cervical adenopathy  Skin:     General: Skin is warm and dry  Neurological:      General: No focal deficit present  Mental Status: She is alert and oriented to person, place, and time           ED Medications  Medications   multi-electrolyte (ISOLYTE-S PH 7 4) bolus 500 mL (500 mL Intravenous New Bag 4/14/23 2024)       Diagnostic Studies  Results Reviewed     Procedure Component Value Units Date/Time    Protime-INR [[id number]]  (Normal) Collected: 04/14/23 2014    Lab Status: Final result Specimen: Blood from Arm, Left Updated: 04/14/23 2042     Protime 13 9 seconds      INR 1 05    APTT [[id number]]  (Normal) Collected: 04/14/23 2014    Lab Status: Final result Specimen: Blood from Arm, Left Updated: 04/14/23 2042     PTT 36 seconds     CBC and differential [[id number]]  (Abnormal) Collected: 04/14/23 2014    Lab Status: Final result Specimen: Blood from Arm, Left Updated: 04/14/23 2027     WBC 13 09 Thousand/uL      RBC 4 23 Million/uL      Hemoglobin 12 1 g/dL      Hematocrit 37 1 %      MCV 88 fL      MCH 28 6 pg      MCHC 32 6 g/dL      RDW 13 2 %      MPV 8 8 fL      Platelets 887 Thousands/uL      nRBC 0 /100 WBCs      Neutrophils Relative 82 %      Immat GRANS % 0 %      Lymphocytes Relative 14 %      Monocytes Relative 4 %      Eosinophils Relative 0 %      Basophils Relative 0 %      Neutrophils Absolute 10 60 Thousands/µL      Immature Grans Absolute 0 05 Thousand/uL      Lymphocytes Absolute 1 80 Thousands/µL      Monocytes Absolute 0 58 Thousand/µL      Eosinophils Absolute 0 02 Thousand/µL      Basophils Absolute 0 04 Thousands/µL     Lactic acid [[id number]]  (Abnormal) Collected: 04/14/23 2014    Lab Status: Final result Specimen: Blood from Arm, Left Updated: 04/14/23 2026     LACTIC ACID 3 3 mmol/L     Narrative:      Result may be elevated if tourniquet was used during collection  Lactic acid 2 Hours [[id number]]     Lab Status: No result Specimen: Blood     Blood culture [[id number]] Collected: 04/14/23 2014    Lab Status: In process Specimen: Blood from Arm, Left Updated: 04/14/23 2020    Blood culture [[id number]] Collected: 04/14/23 2014    Lab Status: In process Specimen: Blood from Arm, Left Updated: 04/14/23 2020    Comprehensive metabolic panel [[id number]] Collected: 04/14/23 2014    Lab Status: In process Specimen: Blood from Arm, Left Updated: 04/14/23 2020    TSH, 3rd generation with Free T4 reflex [[id number]] Collected: 04/14/23 2014    Lab Status: In process Specimen: Blood from Arm, Left Updated: 04/14/23 2020                 No orders to display         Procedures  Procedures      ED Course  ED Course as of 04/14/23 2046 Fri Apr 14, 2023 2031 WBC(!): 13 09   2035 LACTIC ACID(!!): 3 3               Identification of Seniors at [address] [place] [address] Most Recent Value   (ISAR) Identification of Seniors at Risk    Before the illness or injury that brought you to the Emergency, did you need someone to help you on a regular basis? 0 Filed at: 04/14/2023 1723   In the last 24 hours, have you needed more help than usual? 0 Filed at: 04/14/2023 1723   Have you been hospitalized for one or more nights during the past 6 months? 0 Filed at: 04/14/2023 1723   In general, do you see well? 0 Filed at: 04/14/2023 1723   In general, do you have serious problems with your memory?  0 Filed at: 04/14/2023 1723   Do you take more than three different medications every day? 0 Filed at: 04/14/2023 1723   ISAR Score 0 Filed at: 04/14/2023 1723                              MDM      Disposition  Final diagnoses:   None     ED Disposition     None      Follow-up Information    None         Patient's Medications   Discharge Prescriptions    No medications on file     No discharge procedures on file  PDMP Review     None           ED Provider  Attending physically available and evaluated Johnson Herring I managed the patient along with the ED Attending      Electronically Signed by Identification of Seniors at [address] [place] [address] Most Recent Value   (ISAR) Identification of Seniors at Risk    Before the illness or injury that brought you to the Emergency, did you need someone to help you on a regular basis? 0 Filed at: 04/14/2023 1723   In the last 24 hours, have you needed more help than usual? 0 Filed at: 04/14/2023 1723   Have you been hospitalized for one or more nights during the past 6 months? 0 Filed at: 04/14/2023 1723   In general, do you see well? 0 Filed at: 04/14/2023 1723   In general, do you have serious problems with your memory? 0 Filed at: 04/14/2023 1723   Do you take more than three different medications every day? 0 Filed at: 04/14/2023 1723   ISAR Score 0 Filed at: 04/14/2023 [address] [place] [address] Dr Hart  79-year-old female presents for evaluation with 5 weeks of intermittent fevers  Patient endorses associated congestion, fatigue, and decreased appetite, otherwise states she is asymptomatic  On exam, patient currently afebrile with normal vitals  Patient in no acute distress  Lungs clear to auscultation bilaterally, abdomen soft, nontender  Patient does have a murmur noted on exam, and she does not report any known history of heart murmur  Given that patient has had fevers with unknown source, will evaluate with full septic work-up  We will also check TSH and free T4 to rule out hyperthyroidism as cause of fevers  Patient may just be experiencing intermittent viral illnesses  Concern also for possible endocarditis given the fevers and new murmur, although patient appears nontoxic overall and has no other obvious sequelae of endocarditis, so lower suspicion  Will send blood cultures and plan for an echo  Patient's labs significant for a mild leukocytosis of 13 and a mildly elevated lactic acid level  Also noted to have mild hyponatremia and mild hypokalemia, likely secondary to mild dehydration   IV fluids and oral potassium replacement ordered at this time  Labs otherwise unremarkable  Although labs were reassuring, discussed with patient that we still cannot rule out endocarditis or other more serious pathology at this time  Patient was offered admission for further workup, but patient and family declined at this time  Patient was given a script to have an outpatient echo obtained, and they were told that they would be contacted if the blood cultures grew out any bacteria  Patient was instructed to follow up with her PCP as soon as possible, and to return to the emergency department for any new or worsening symptoms or other concerns  Patient expressed understanding, and she was discharged home in stable condition  Fever: acute illness or injury  Heart murmur: undiagnosed new problem with uncertain prognosis  Hypokalemia: acute illness or injury  Amount and/or Complexity of Data Reviewed  Labs: ordered  Decision-making details documented in ED Course  Risk  Prescription drug management  Disposition  Final diagnoses:   Fever   Hypokalemia   Heart murmur     Time reflects when diagnosis was documented in both MDM as applicable and the Disposition within this note     Time User Action Codes Description Comment    4/14/2023 10:02 PM Phan Missy Add [R50 9] Fever     4/14/2023 10:03 PM Phan Missy Add [E87 6] Hypokalemia     4/14/2023 10:03 PM Phan Missy Add [R01 1] Heart murmur       ED Disposition     ED Disposition   Discharge    Condition   Stable    Date/Time   Fri Apr 14, 2023 10:02 PM    Comment   Elia Goldmann discharge to home/self care                 Follow-up Information     Follow up With Specialties Details Why Contact Info Additional Information    Nasir Casper MD Family Medicine Schedule an appointment as soon as possible for a visit in 1 week  [address] Emergency Department Emergency Medicine Go to  If symptoms worsen [address] [id number]  [address] [address] [facility] Emergency Department, [address], [facility] [place] [address], [address] [facility]          Discharge Medication List as of 4/14/2023 10:06 PM      CONTINUE these medications which have NOT CHANGED    Details   apixaban (ELIQUIS) 2 5 mg Take 1 tablet (2 5 mg total) by mouth 2 (two) times a day for 56 doses, Starting Tue 4/27/2021, Until Tue 5/25/2021, Normal      atorvastatin (LIPITOR) 40 mg tablet Take 1 tablet (40 mg total) by mouth daily at bedtime for 4 days, Starting Tue 4/27/2021, Until Sat 5/1/2021, Normal      benzonatate (TESSALON PERLES) 100 mg capsule Take 1 capsule (100 mg total) by mouth 3 (three) times a day as needed for cough, Starting Tue 4/27/2021, Normal      cholecalciferol (VITAMIN D3) 1,000 units tablet Take 2 tablets (2,000 Units total) by mouth daily, Starting Wed 4/28/2021, Normal      cilostazol (PLETAL) 50 mg tablet Take 50 mg by mouth 2 (two) times a day, Historical Med      famotidine (PEPCID) 20 mg tablet Take 1 tablet (20 mg total) by mouth daily for 14 days, Starting Wed 4/28/2021, Until Wed 5/12/2021, Normal      imiquimod (ALDARA) 5 % cream Apply topically to affected areas of face including nose three nights a week for a total of 4 weeks, discontinue for 4 weeks, then repeat three nights a week for 4 weeks  , Normal      insulin glargine (Basaglar KwikPen) 100 units/mL injection pen Inject 10 Units under the skin daily at bedtime, Starting Tue 4/27/2021, Normal      lisinopril (ZESTRIL) 10 mg tablet Take 1 tablet (10 mg total) by mouth daily, Starting Wed 4/28/2021, Normal      metoprolol tartrate (LOPRESSOR) 25 mg tablet Take 1 tablet (25 mg total) by mouth every 12 (twelve) hours, Starting Tue 4/27/2021, Normal      pyridoxine (B-6) 100 MG tablet Take 100 mg by mouth daily, Historical Med      vitamin A 3 MG (30953 UT) capsule Take 10,000 Units by mouth daily, Historical Med      Vitamin E 45 MG (100 UNIT) CAPS Take 100 Units by mouth daily, Historical Med           Outpatient Discharge Orders   Echo complete w/ contrast if indicated   Standing Status: Future Number of Occurrences: 1 Standing Exp  Date: 04/14/27       PDMP Review     None           ED Provider  Attending physically available and evaluated Ina Perez I managed the patient along with the ED Attending      Electronically Signed by         Moses Trimble DO  05/07/23 1012

## 2023-04-15 NOTE — DISCHARGE INSTRUCTIONS
Please contact central scheduling at [phone] to schedule your echo  Please follow-up with your PCP as soon as possible for reevaluation  You will be notified if your blood cultures grow out any bacteria, you may be asked to return to the emergency department if these are positive  Return to the emergency department for any new or worsening symptoms including persistent fevers, increased fatigue, or other concerning symptoms

## 2023-04-18 NOTE — ED ATTENDING ATTESTATION
4/14/2023  INena MD, saw and evaluated the patient  I have discussed the patient with the resident/non-physician practitioner and agree with the resident's/non-physician practitioner's findings, Plan of Care, and MDM as documented in the resident's/non-physician practitioner's note, except where noted  All available labs and Radiology studies were reviewed  I was present for key portions of any procedure(s) performed by the resident/non-physician practitioner and I was immediately available to provide assistance  At this point I agree with the current assessment done in the Emergency Department  I have conducted an independent evaluation of this patient a history and physical is as follows:      Patient is an 65-year-old female who presents with intermittent fevers for past 5 weeks  Additional symptoms include fatigue, coughing and congestion  Today patient reports fever to 102  Patient was seen and evaluated in urgent care and referred to ED for evaluation for possible sepsis  Vital signs reviewed  Patient currently afebrile  Well-appearing nontoxic no acute distress  Heart regular rate and rhythm  Systolic murmur present  Lungs clear to auscultation bilaterally  Abdomen soft nontender nondistended normal bowel sounds  Extremities no edema  No rash present      Impression: Fever  Differential diagnosis: Sepsis, occult infection, Lyme disease, endocarditis, viral syndrome, autoimmune or rheumatologic process    Plan to check PT/INR CBC CMP, lactate blood cultures TSH, ECG    IV fluids reassess        ED Course     ECG independently interpreted by me normal sinus rhythm leftward axis, left bundle branch block normal ECG    Labs reviewed: Remarkable for leukocytosis, lactic acidosis, mild hyponatremia mild hypokalemia hyperglycemia elevated BUN    Decision regarding hospitalization: Patient presents with intermittent fevers for 5 weeks and questionable new heart murmur as upon review of medical records I am unable to identify patient had a murmur present previously  Given findings patient was offered hospitalization for further evaluation management including echocardiogram evaluation by internal medicine  Patient and family prefer to go home at this time will follow-up ED with PCP as outpatient  Patient given strict return precautions patient and family verbalized understanding of all instructions as discussed      Critical Care Time  Procedures

## 2023-04-20 LAB
BACTERIA BLD CULT: NORMAL
BACTERIA BLD CULT: NORMAL

## 2023-05-04 ENCOUNTER — HOSPITAL ENCOUNTER (OUTPATIENT)
Dept: NON INVASIVE DIAGNOSTICS | Facility: HOSPITAL | Age: 83
Discharge: HOME | End: 2023-05-04
Attending: EMERGENCY MEDICINE

## 2023-05-04 VITALS
WEIGHT: 173 LBS | HEIGHT: 62 IN | DIASTOLIC BLOOD PRESSURE: 67 MMHG | BODY MASS INDEX: 31.83 KG/M2 | HEART RATE: 60 BPM | SYSTOLIC BLOOD PRESSURE: 149 MMHG

## 2023-05-04 DIAGNOSIS — R50.9 FEVER: ICD-10-CM

## 2023-05-04 DIAGNOSIS — R01.1 HEART MURMUR: ICD-10-CM

## 2023-05-04 LAB
AORTIC ROOT: 2.8 CM
APICAL FOUR CHAMBER EJECTION FRACTION: 51 %
ASCENDING AORTA: 3.1 CM
E WAVE DECELERATION TIME: 174 MS
FRACTIONAL SHORTENING: 22 % (ref 28–44)
INTERVENTRICULAR SEPTUM IN DIASTOLE (PARASTERNAL SHORT AXIS VIEW): 1.1 CM
INTERVENTRICULAR SEPTUM: 1.1 CM (ref 0.6–1.1)
LAAS-AP2: 23.2 CM2
LAAS-AP4: 23.8 CM2
LEFT ATRIUM SIZE: 4.4 CM
LEFT INTERNAL DIMENSION IN SYSTOLE: 4 CM (ref 2.1–4)
LEFT VENTRICLE DIASTOLIC VOLUME (MOD BIPLANE): 167 ML
LEFT VENTRICLE SYSTOLIC VOLUME (MOD BIPLANE): 81 ML
LEFT VENTRICULAR INTERNAL DIMENSION IN DIASTOLE: 5.1 CM (ref 3.5–6)
LEFT VENTRICULAR POSTERIOR WALL IN END DIASTOLE: 1.2 CM
LEFT VENTRICULAR STROKE VOLUME: 57 ML
LV EF: 51 %
LVSV (TEICH): 57 ML
MV E'TISSUE VEL-SEP: 5 CM/S
MV PEAK A VEL: 1.08 M/S
MV PEAK E VEL: 102 CM/S
MV STENOSIS PRESSURE HALF TIME: 51 MS
MV VALVE AREA P 1/2 METHOD: 4.31 CM2
RA PRESSURE ESTIMATED: 3 MMHG
RIGHT ATRIUM AREA SYSTOLE A4C: 12.8 CM2
RIGHT VENTRICLE ID DIMENSION: 3.8 CM
RV PSP: 64 MMHG
SL CV LEFT ATRIUM LENGTH A2C: 5.3 CM
SL CV LV EF: 48
SL CV PED ECHO LEFT VENTRICLE DIASTOLIC VOLUME (MOD BIPLANE) 2D: 126 ML
SL CV PED ECHO LEFT VENTRICLE SYSTOLIC VOLUME (MOD BIPLANE) 2D: 69 ML
TR MAX PG: 61 MMHG
TR PEAK VELOCITY: 3.9 M/S
TRICUSPID ANNULAR PLANE SYSTOLIC EXCURSION: 2.2 CM
TRICUSPID VALVE PEAK REGURGITATION VELOCITY: 3.9 M/S

## 2023-05-04 RX ADMIN — PERFLUTREN 0.4 ML/MIN: 6.52 INJECTION, SUSPENSION INTRAVENOUS at 14:45

## 2025-03-03 NOTE — PROGRESS NOTES
Cardiology Consultation     Mary Mark  [id number]  1940  HEART & VASCULAR [facility]  [address] [place] [place] [address]  1. Primary hypertension  POCT ECG    Echo complete w/ contrast if indicated    losartan-hydrochlorothiazide (HYZAAR) 50-12.5 mg per tablet    Comprehensive metabolic panel    TSH, 3rd generation with Free T4 reflex    Lipid panel    potassium chloride (Klor-Con M10) 10 mEq tablet      2. Pure hypercholesterolemia  POCT ECG    Echo complete w/ contrast if indicated    losartan-hydrochlorothiazide (HYZAAR) 50-12.5 mg per tablet    Comprehensive metabolic panel    TSH, 3rd generation with Free T4 reflex    Lipid panel    potassium chloride (Klor-Con M10) 10 mEq tablet      3. Abnormal EKG  POCT ECG    Echo complete w/ contrast if indicated    losartan-hydrochlorothiazide (HYZAAR) 50-12.5 mg per tablet    Comprehensive metabolic panel    TSH, 3rd generation with Free T4 reflex    Lipid panel    potassium chloride (Klor-Con M10) 10 mEq tablet      4. LBBB (left bundle branch block)  POCT ECG    Echo complete w/ contrast if indicated    losartan-hydrochlorothiazide (HYZAAR) 50-12.5 mg per tablet    Comprehensive metabolic panel    TSH, 3rd generation with Free T4 reflex    Lipid panel    potassium chloride (Klor-Con M10) 10 mEq tablet      5. Cardiomyopathy, unspecified type (HCC)  POCT ECG    Echo complete w/ contrast if indicated    losartan-hydrochlorothiazide (HYZAAR) 50-12.5 mg per tablet    Comprehensive metabolic panel    TSH, 3rd generation with Free T4 reflex    Lipid panel    potassium chloride (Klor-Con M10) 10 mEq tablet        Patient Active Problem List   Diagnosis   • Type 2 diabetes mellitus, without long-term current use of insulin (HCC)   • Acute respiratory failure with hypoxia (HCC)   • Hypertension   • Obesity (BMI 30-39.9)     HPI patient is here for cardiology evaluation.  There is  concern in reference to abnormal EKG.  Patient has DM, HTN and HLD.  Echocardiogram 5/4/2023 demonstrated LVEF of 48% with mild LVH.  There was mild LAE.  There was mild to moderate MR.  Moderate to severe elevation in RVSP noted.  EKG 4/14/2023 demonstrated NSR with LBBB and LAD.  There was no change compared to 4/17/2021.  EKG today demonstrates NSR with LBBB and LAD.  EKG is comparable in appearance to tracing 1/14/2025.  Patient has had some shortness of breath.  She has noted ankle edema.  She was hospitalized 4/2021 for treatment of COVID-pneumonia.  At that time she was discharged on lisinopril and metoprolol.  She is currently not taking these medicines.  She was also on Eliquis which she is also not taking.  She is a non-smoker.  She smoked briefly as a teenager.  There is no FH of heart disease.  Her daughter is here.  She tells me her mother had vascular surgery performed in the left upper leg in the setting of blood clot.  This was done at [facility] in 2022.    PMH-  Past Medical History:   Diagnosis Date   • Diabetes mellitus (HCC)    • Obese         SOCIAL HISTORY-  Social History     Socioeconomic History   • Marital status: Unknown     Spouse name: Not on file   • Number of children: Not on file   • Years of education: Not on file   • Highest education level: Not on file   Occupational History   • Not on file   Tobacco Use   • Smoking status: Former   • Smokeless tobacco: Never   Vaping Use   • Vaping status: Never Used   Substance and Sexual Activity   • Alcohol use: Yes     Comment: rarely    • Drug use: Not Currently   • Sexual activity: Not on file   Other Topics Concern   • Not on file   Social History Narrative   • Not on file     Social Drivers of Health     Financial Resource Strain: Not on file   Food Insecurity: Not on file   Transportation Needs: Not on file   Physical Activity: Not on file   Stress: Not on file   Social Connections: Not on file   Intimate Partner Violence:  Not on file   Housing Stability: Not on file        FAMILY HISTORY-  No family history on file.    SURGICAL HISTORY-  No past surgical history on file.      Current Outpatient Medications:   •  aspirin 81 mg chewable tablet, Chew 81 mg daily, Disp: , Rfl:   •  cholecalciferol (VITAMIN D3) 1,000 units tablet, Take 2 tablets (2,000 Units total) by mouth daily, Disp: 14 tablet, Rfl: 0  •  insulin glargine (Basaglar KwikPen) 100 units/mL injection pen, Inject 10 Units under the skin daily at bedtime (Patient taking differently: Inject 5 Units under the skin daily at bedtime), Disp: 15 mL, Rfl: 0  •  losartan-hydrochlorothiazide (HYZAAR) 50-12.5 mg per tablet, Take 1 tablet by mouth daily, Disp: 90 tablet, Rfl: 3  •  potassium chloride (Klor-Con M10) 10 mEq tablet, Take 1 tablet (10 mEq total) by mouth daily, Disp: 90 tablet, Rfl: 3  •  pyridoxine (B-6) 100 MG tablet, Take 100 mg by mouth daily, Disp: , Rfl:   •  vitamin A 3 MG (84028 UT) capsule, Take 10,000 Units by mouth daily, Disp: , Rfl:   •  Vitamin E 45 MG (100 UNIT) CAPS, Take 100 Units by mouth daily, Disp: , Rfl:   •  apixaban (ELIQUIS) 2.5 mg, Take 1 tablet (2.5 mg total) by mouth 2 (two) times a day for 56 doses (Patient not taking: Reported on 3/13/2025), Disp: 56 tablet, Rfl: 0  •  atorvastatin (LIPITOR) 40 mg tablet, Take 1 tablet (40 mg total) by mouth daily at bedtime for 4 days (Patient not taking: Reported on 3/13/2025), Disp: 4 tablet, Rfl: 0  •  benzonatate (TESSALON PERLES) 100 mg capsule, Take 1 capsule (100 mg total) by mouth 3 (three) times a day as needed for cough (Patient not taking: Reported on 9/20/2021), Disp: 20 capsule, Rfl: 0  •  cilostazol (PLETAL) 50 mg tablet, Take 50 mg by mouth 2 (two) times a day (Patient not taking: Reported on 4/14/2023), Disp: , Rfl:   •  famotidine (PEPCID) 20 mg tablet, Take 1 tablet (20 mg total) by mouth daily for 14 days (Patient not taking: Reported on 4/14/2023), Disp: 14 tablet, Rfl: 0  •  imiquimod  "(ALDARA) 5 % cream, Apply topically to affected areas of face including nose three nights a week for a total of 4 weeks, discontinue for 4 weeks, then repeat three nights a week for 4 weeks. (Patient not taking: Reported on 4/14/2023), Disp: 24 each, Rfl: 0  •  metoprolol tartrate (LOPRESSOR) 25 mg tablet, Take 1 tablet (25 mg total) by mouth every 12 (twelve) hours (Patient not taking: Reported on 4/14/2023), Disp: 60 tablet, Rfl: 0  No Known Allergies  Vitals:    03/13/25 1341   BP: 156/72   BP Location: Left arm   Patient Position: Sitting   Cuff Size: Standard   Pulse: 61   SpO2: 99%   Weight: 70.3 kg (155 lb)   Height: 5' 1.5\" (1.562 m)         Review of Systems:  Review of Systems   All other systems reviewed and are negative.      Physical Exam:  Physical Exam  Vitals reviewed.   Constitutional:       Appearance: She is well-developed.   HENT:      Head: Normocephalic and atraumatic.   Eyes:      Conjunctiva/sclera: Conjunctivae normal.      Pupils: Pupils are equal, round, and reactive to light.   Cardiovascular:      Rate and Rhythm: Normal rate.      Heart sounds: Normal heart sounds.   Pulmonary:      Effort: Pulmonary effort is normal.      Breath sounds: Normal breath sounds.   Musculoskeletal:      Cervical back: Normal range of motion and neck supple.      Right lower leg: Edema present.      Left lower leg: Edema present.      Comments: Mild pretibial edema left greater than right.   Skin:     General: Skin is warm and dry.   Neurological:      Mental Status: She is alert and oriented to person, place, and time.       Discussion/Summary: Patient with shortness of breath and ankle edema.  Her blood pressure is elevated.  Her echo for 5/4/2023 demonstrated mild cardiomyopathy with increased right heart pressure.  Will start losartan HCTZ 50/12.5 mg/day.  Will use potassium supplement 10 mEq/day.  Will check blood work including CMP, lipid profile and TSH.  I have asked the patient to be cautious with " her salt intake.  We discussed a stress test at some point but will defer on this for now until she is better compensated.  I will see her in follow-up in 3 months and sooner as is necessary.  We will escalate her diuretic therapy as necessary.

## 2025-03-13 ENCOUNTER — OFFICE VISIT (OUTPATIENT)
Dept: CARDIOLOGY CLINIC | Facility: CLINIC | Age: 85
End: 2025-03-13
Payer: MEDICARE

## 2025-03-13 VITALS
WEIGHT: 155 LBS | OXYGEN SATURATION: 99 % | HEART RATE: 61 BPM | DIASTOLIC BLOOD PRESSURE: 72 MMHG | HEIGHT: 62 IN | SYSTOLIC BLOOD PRESSURE: 156 MMHG | BODY MASS INDEX: 28.52 KG/M2

## 2025-03-13 DIAGNOSIS — I42.9 CARDIOMYOPATHY, UNSPECIFIED TYPE (HCC): ICD-10-CM

## 2025-03-13 DIAGNOSIS — E78.00 PURE HYPERCHOLESTEROLEMIA: ICD-10-CM

## 2025-03-13 DIAGNOSIS — I44.7 LBBB (LEFT BUNDLE BRANCH BLOCK): ICD-10-CM

## 2025-03-13 DIAGNOSIS — I10 PRIMARY HYPERTENSION: Primary | ICD-10-CM

## 2025-03-13 DIAGNOSIS — R94.31 ABNORMAL EKG: ICD-10-CM

## 2025-03-13 PROCEDURE — 93000 ELECTROCARDIOGRAM COMPLETE: CPT | Performed by: INTERNAL MEDICINE

## 2025-03-13 PROCEDURE — 99204 OFFICE O/P NEW MOD 45 MIN: CPT | Performed by: INTERNAL MEDICINE

## 2025-03-13 RX ORDER — POTASSIUM CHLORIDE 750 MG/1
10 TABLET, EXTENDED RELEASE ORAL DAILY
Qty: 90 TABLET | Refills: 3 | Status: SHIPPED | OUTPATIENT
Start: 2025-03-13

## 2025-03-13 RX ORDER — LOSARTAN POTASSIUM AND HYDROCHLOROTHIAZIDE 12.5; 5 MG/1; MG/1
1 TABLET ORAL DAILY
Qty: 90 TABLET | Refills: 3 | Status: SHIPPED | OUTPATIENT
Start: 2025-03-13

## 2025-03-13 RX ORDER — ASPIRIN 81 MG/1
81 TABLET, CHEWABLE ORAL DAILY
COMMUNITY

## 2025-03-13 NOTE — PATIENT INSTRUCTIONS
Will schedule cardiac testing.  Please start losartan HCTZ 50/12.5 mg once a day.  Take potassium 10 mEq/day.  Watch your salt intake.  Please get blood work in about 3 to 4 weeks.  Please call if there is a problem.  I will see you in follow-up.

## 2025-03-16 ENCOUNTER — RESULTS FOLLOW-UP (OUTPATIENT)
Dept: CARDIOLOGY CLINIC | Facility: CLINIC | Age: 85
End: 2025-03-16

## 2025-03-16 DIAGNOSIS — E78.00 PURE HYPERCHOLESTEROLEMIA: ICD-10-CM

## 2025-03-16 DIAGNOSIS — I10 PRIMARY HYPERTENSION: Primary | ICD-10-CM

## 2025-03-16 DIAGNOSIS — I44.7 LBBB (LEFT BUNDLE BRANCH BLOCK): ICD-10-CM

## 2025-03-16 DIAGNOSIS — I42.9 CARDIOMYOPATHY, UNSPECIFIED TYPE (HCC): ICD-10-CM

## 2025-03-16 LAB
ALBUMIN SERPL-MCNC: 4.1 G/DL (ref 3.7–4.7)
ALP SERPL-CCNC: 70 IU/L (ref 44–121)
ALT SERPL-CCNC: 13 IU/L (ref 0–32)
AST SERPL-CCNC: 23 IU/L (ref 0–40)
BILIRUB SERPL-MCNC: 0.4 MG/DL (ref 0–1.2)
BUN SERPL-MCNC: 18 MG/DL (ref 8–27)
BUN/CREAT SERPL: 19 (ref 12–28)
CALCIUM SERPL-MCNC: 9.5 MG/DL (ref 8.7–10.3)
CHLORIDE SERPL-SCNC: 105 MMOL/L (ref 96–106)
CHOLEST SERPL-MCNC: 246 MG/DL (ref 100–199)
CO2 SERPL-SCNC: 23 MMOL/L (ref 20–29)
CREAT SERPL-MCNC: 0.96 MG/DL (ref 0.57–1)
EGFR: 58 ML/MIN/1.73
GLOBULIN SER-MCNC: 2.3 G/DL (ref 1.5–4.5)
GLUCOSE SERPL-MCNC: 127 MG/DL (ref 70–99)
HDLC SERPL-MCNC: 84 MG/DL
LDLC SERPL CALC-MCNC: 150 MG/DL (ref 0–99)
POTASSIUM SERPL-SCNC: 4.6 MMOL/L (ref 3.5–5.2)
PROT SERPL-MCNC: 6.4 G/DL (ref 6–8.5)
SL AMB T4, FREE (DIRECT): 1.21 NG/DL (ref 0.82–1.77)
SL AMB VLDL CHOLESTEROL CALC: 12 MG/DL (ref 5–40)
SODIUM SERPL-SCNC: 144 MMOL/L (ref 134–144)
TRIGL SERPL-MCNC: 74 MG/DL (ref 0–149)
TSH SERPL DL<=0.005 MIU/L-ACNC: 8.83 UIU/ML (ref 0.45–4.5)

## 2025-03-17 DIAGNOSIS — E03.9 HYPOTHYROIDISM, UNSPECIFIED TYPE: Primary | ICD-10-CM

## 2025-03-17 RX ORDER — LEVOTHYROXINE SODIUM 50 UG/1
50 TABLET ORAL DAILY
Qty: 90 TABLET | Refills: 3 | Status: SHIPPED | OUTPATIENT
Start: 2025-03-17

## 2025-03-17 NOTE — TELEPHONE ENCOUNTER
Received call from pt and her daughter.  Relayed message from Dr. Shaffer.  Pt states she has not been taking the atorvastatin.  She said when she took it years ago it caused back pain so she stopped it.  She said she would prefer to take something more natural.    Pt agreeable with taking Synthroid.  She would need a script for this sent to [facility].  She will also need blood work order.

## 2025-03-17 NOTE — TELEPHONE ENCOUNTER
----- Message from Terrell Shaffer MD sent at 3/16/2025  8:12 AM EDT -----  Please call her.  Her cholesterol is high.  She is supposed to be taking atorvastatin 40 mg/day.  Would encourage her to do this.  I do not think she is taking it.  She is hypothyroid.  Would recommend she take Synthroid 0.05 mg once a day.  Please recheck lipid profile, liver profile, TSH and T4 in 2 to 3 months.  Thank you.    LVM asking for either Cat or Mary to call back for results.

## 2025-04-03 ENCOUNTER — HOSPITAL ENCOUNTER (OUTPATIENT)
Dept: NON INVASIVE DIAGNOSTICS | Facility: CLINIC | Age: 85
Discharge: HOME | End: 2025-04-03
Payer: MEDICARE

## 2025-04-03 VITALS
HEIGHT: 62 IN | DIASTOLIC BLOOD PRESSURE: 72 MMHG | SYSTOLIC BLOOD PRESSURE: 156 MMHG | HEART RATE: 61 BPM | WEIGHT: 155 LBS | BODY MASS INDEX: 28.52 KG/M2

## 2025-04-03 DIAGNOSIS — R94.31 ABNORMAL EKG: ICD-10-CM

## 2025-04-03 DIAGNOSIS — I10 PRIMARY HYPERTENSION: ICD-10-CM

## 2025-04-03 DIAGNOSIS — I44.7 LBBB (LEFT BUNDLE BRANCH BLOCK): ICD-10-CM

## 2025-04-03 DIAGNOSIS — E78.00 PURE HYPERCHOLESTEROLEMIA: ICD-10-CM

## 2025-04-03 DIAGNOSIS — I42.9 CARDIOMYOPATHY, UNSPECIFIED TYPE (HCC): ICD-10-CM

## 2025-04-03 LAB
AORTIC ROOT: 2.7 CM
ASCENDING AORTA: 2.9 CM
BSA FOR ECHO PROCEDURE: 1.71 M2
E WAVE DECELERATION TIME: 176 MS
E/A RATIO: 0.89
FRACTIONAL SHORTENING: 24 (ref 28–44)
INTERVENTRICULAR SEPTUM IN DIASTOLE (PARASTERNAL SHORT AXIS VIEW): 1.3 CM
INTERVENTRICULAR SEPTUM: 1.3 CM (ref 0.6–1.1)
LAAS-AP2: 21.2 CM2
LAAS-AP4: 21 CM2
LEFT ATRIUM SIZE: 4.4 CM
LEFT ATRIUM VOLUME (MOD BIPLANE): 69 ML
LEFT ATRIUM VOLUME INDEX (MOD BIPLANE): 40.4 ML/M2
LEFT INTERNAL DIMENSION IN SYSTOLE: 3.7 CM (ref 2.1–4)
LEFT VENTRICULAR INTERNAL DIMENSION IN DIASTOLE: 4.9 CM (ref 3.5–6)
LEFT VENTRICULAR POSTERIOR WALL IN END DIASTOLE: 1.3 CM
LEFT VENTRICULAR STROKE VOLUME: 52 ML
LV EF US.2D.A4C+ESTIMATED: 39 %
LVSV (TEICH): 52 ML
MV E'TISSUE VEL-LAT: 7 CM/S
MV E'TISSUE VEL-SEP: 5 CM/S
MV PEAK A VEL: 0.94 M/S
MV PEAK E VEL: 84 CM/S
MV STENOSIS PRESSURE HALF TIME: 51 MS
MV VALVE AREA P 1/2 METHOD: 4.31
RA PRESSURE ESTIMATED: 3 MMHG
RIGHT ATRIUM AREA SYSTOLE A4C: 15.4 CM2
RIGHT VENTRICLE ID DIMENSION: 3.5 CM
RV PSP: 45 MMHG
SL CV LEFT ATRIUM LENGTH A2C: 5 CM
SL CV LV EF: 40
SL CV PED ECHO LEFT VENTRICLE DIASTOLIC VOLUME (MOD BIPLANE) 2D: 111 ML
SL CV PED ECHO LEFT VENTRICLE SYSTOLIC VOLUME (MOD BIPLANE) 2D: 60 ML
TR MAX PG: 42 MMHG
TR PEAK VELOCITY: 3.2 M/S
TRICUSPID ANNULAR PLANE SYSTOLIC EXCURSION: 2 CM
TRICUSPID VALVE PEAK REGURGITATION VELOCITY: 3.23 M/S

## 2025-04-03 PROCEDURE — 93306 TTE W/DOPPLER COMPLETE: CPT | Performed by: INTERNAL MEDICINE

## 2025-04-03 PROCEDURE — 93306 TTE W/DOPPLER COMPLETE: CPT

## 2025-04-10 ENCOUNTER — TELEPHONE (OUTPATIENT)
Age: 85
End: 2025-04-10

## 2025-04-10 NOTE — TELEPHONE ENCOUNTER
Caller: Cat - patient's daughter    Doctor: Dr. Shaffer    Reason for call: Patient's daughter called inquiring about the results of the stress test done on 4/3/25. She said that the patient is also curious as to why she is taking potassium.     Call back#: daughter's number [phone]

## 2025-04-20 NOTE — DISCHARGE SUMMARY
Continued Stay Note   [place]     Patient Name: Javier Miles  MRN: [id number]  TERESA Espinal     [address]  Discharge- Pasty Mom 1940, 79 y o  female MRN: [id number]  Unit/Bed#: -Ruben Encounter: [id number]  Primary Care Provider: Everett Key MD   Date and time admitted to hospital: 4/17/2021  8:22 PM    * Pneumonia due to COVID-19 virus  Assessment & Plan  · Came in due to generalized fatigue over the past week  Patient had a COVID test outpatient but has been waiting for the results  · COVID positive in the ED  · 84% on room air, initially placed on 2 L, escalating O2 requirements currently on 9L midflow  Evaluated for home oxygen needs, qualify for 2 L  ·   Ordered Actemra 4/19  · Cardiac markers   · Troponin negative  · CK normal 41  · BNP 1805  · Inflammatory markers   · CRP:  157 5 > 34 7  · D-dimer elevated at 1 69 > 0 50  · Ferritin 308 > pending  · In the ED patient received ceftriaxone, doxycycline and Decadron  · Continue vitamin cocktail, Pepcid  · Completed remdesivir  · Continue patient on atorvastatin 40 mg daily  · CXR 4/21 reveals slight progression of pneumonia, repeat 4/22 overall stable  · Repeat procalcitonin normal  · Also recommended for IV diuresis per pulm previously, good response to Lasix  · Appreciate ongoing pulmonology recommendations  · Oxygen requirement continues to improve, will send patient home with home oxygen      Hypertension  Assessment & Plan  · Pressures have been elevated during admission  · Continue 10mg lisinopril and 25mg metoprolol BID    Acute respiratory failure with hypoxia (HCC)  Assessment & Plan  · In the setting of COVID-19  · Greatly improving, originally had been weaned to room air however did have a SpO2 of 88% and therefore was put back on 1 L nasal cannula with improvement to 95%  · Home O2: patient does qualify  · Wean as able to maintain SpO2 > 90%    Type 2 diabetes mellitus, without long-term current use of insulin [facility])  Assessment & Plan  Lab Results   Component Value Date    HGBA1C 8 8 (H) 04/17/2021 Recent Labs     04/26/21  1804 04/26/21  2205 04/27/21  0732 04/27/21  1231   POCGLU 194* 209* 232* 254*       Blood Sugar Average: Last 72 hrs:  (P) 214  · Patient reports she takes oral diabetes medications at home, She is not sure which ones or doses  Denies current use of insulin or other injection medications  · Continue SSI  · Added 10u Lantus qhs, 4/23 had episode of hypoglycemia and Humalog decreased to 3 units t i d  Now with persistent hyperglycemia again will increase to 5 units t i d   · Recommend Resuming oral diabetic medications on discharge, will continue 10 units Lantus, patient will not be discharged on steroids, anticipate blood sugars will decrease, outpatient follow-up with PCP  · Monitor blood sugars at home on discharge    Discharging Physician / Practitioner: Judie Timmons PA-C  PCP: Manuel Rodas MD  Admission Date:   Admission Orders (From admission, onward)     Ordered        04/17/21 2225  Inpatient Admission  Once                   Discharge Date: 04/27/21    Resolved Problems  Date Reviewed: 4/27/2021          Resolved    Hyponatremia 4/22/2021     Resolved by  Jimi Gore PA-C          Consultations During Hospital Stay:  · Pulmonology    Procedures Performed:   · None    Significant Findings / Test Results:   · CXR 4/17: Hazy infiltrate throughout the right lung most prominent in right parahilar position  Probable left basilar infiltrate  Findings are compatible with Covid pneumonia  · CXR 4/21: Patchy multifocal parenchymal airspace opacity is slightly progressed when compared to prior examination consistent with subtle progression of viral pneumonia due to COVID-19 infection  · No definite change in bilateral groundglass opacity, slightly greater on the right, due to Covid-19  · Blood Culture 2/2 negative x 5 days    Incidental Findings:   · None    Test Results Pending at Discharge (will require follow up):    · None     Outpatient Tests Requested:  · Repeat 2.13 chest imaging to ensure resolution- 3 months    Complications:  Prolonged hospitalization due to increasing oxygen requirements    Reason for Admission: weakness related to COVID, requiring O2    Hospital Course:     Dae Bailon is a 79 y o  female patient with past medical history of type 2 diabetes who originally presented to the hospital on 4/17/2021 due to generalized fatigue  Patient had a positive COVID test 4/17  She reported decreased appetite and poor intake of fluids  She denied shortness of breath, chest pain, abdominal pain, fevers, chills, nausea, vomiting or diarrhea  Patient was unsure for medications at time of admission  Antihypertensives and insulin added this admission as well as COVID-19 medications including vitamins, statin and Pepcid  Patient had prolonged hospital stay due to increasing oxygen requirements requiring pulmonology consultation  At times, patient required as high as 15 L nasal cannula mid flow oxygen  Given severity of illness will discharge on 1 month of anticoagulation as well  Patient completed course of steroids, Remdesivir, received Actemra this admission  Hemodynamically stable at time of discharge and appropriate for outpatient follow-up  Case management arranging oxygen delivery  Please see above list of diagnoses and related plan for additional information  Condition at Discharge: stable     Discharge Day Visit / Exam:     Subjective:  Patient reports he feels well, still rely on oxygen but is eager for return home  Vitals: Blood Pressure: 142/58 (04/27/21 0702)  Pulse: 62 (04/27/21 0816)  Temperature: 97 5 °F (36 4 °C) (04/27/21 0714)  Temp Source: Oral (04/22/21 2205)  Respirations: 18 (04/25/21 0750)  Height: 5' 2" (157 5 cm) (04/17/21 2327)  Weight - Scale: 77 2 kg (170 lb 3 1 oz) (04/17/21 2327)  SpO2: 97 % (04/27/21 0935)  Exam:   Physical Exam  Constitutional:       General: She is not in acute distress  Appearance: Normal appearance   She is well-developed  Interventions: Nasal cannula in place  HENT:      Head: Normocephalic and atraumatic  Eyes:      General: No scleral icterus  Conjunctiva/sclera: Conjunctivae normal    Cardiovascular:      Rate and Rhythm: Normal rate and regular rhythm  Heart sounds: No murmur  Pulmonary:      Effort: Pulmonary effort is normal       Breath sounds: No wheezing, rhonchi or rales  Abdominal:      General: There is no distension  Palpations: Abdomen is soft  Skin:     General: Skin is warm and dry  Neurological:      General: No focal deficit present  Mental Status: She is alert  Psychiatric:         Mood and Affect: Mood normal        Discussion with Family: Discussed with patient's daughter, Bill Paredes over the phone  Discharge instructions/Information to patient and family:   See after visit summary for information provided to patient and family  Provisions for Follow-Up Care:  See after visit summary for information related to follow-up care and any pertinent home health orders  Disposition:     Home with VNA Services (Reminder: Complete face to face encounter)    For Discharges to [facility]F:   · Not Applicable to this Patient - Not Applicable to this Patient    Planned Readmission: None     Discharge Statement:  I spent 65 minutes discharging the patient  This time was spent on the day of discharge  I had direct contact with the patient on the day of discharge  Greater than 50% of the total time was spent examining patient, answering all patient questions, arranging and discussing plan of care with patient as well as directly providing post-discharge instructions  Additional time then spent on discharge activities  Discharge Medications:  See after visit summary for reconciled discharge medications provided to patient and family        ** Please Note: This note has been constructed using a voice recognition system **

## 2025-07-03 ENCOUNTER — TELEPHONE (OUTPATIENT)
Age: 85
End: 2025-07-03

## 2025-07-03 NOTE — TELEPHONE ENCOUNTER
Caller: Cat Shaffer    Doctor: Dr. Shaffer    Reason for call: Patient's daughter called in asking if the lab work should still be completed since it was supposed to be done in 2-3 months from march. She is also questioning if her mom should be seen sooner than November (dr shaffer's first appt). I let her know we can get her scheduled with an MIRTHA sooner but she wants to hear from Dr Shaffer first.     Call back#: [phone]

## 2025-07-07 NOTE — TELEPHONE ENCOUNTER
LVM for Cat relaying message as given per Dr. Shaffer. Ok to see an AP sooner if they would like to do so but keep the Nov. Appointment with Dr. Shaffer regardless.

## 2025-07-23 ENCOUNTER — TELEPHONE (OUTPATIENT)
Dept: CARDIOLOGY CLINIC | Facility: CLINIC | Age: 85
End: 2025-07-23

## 2025-07-23 DIAGNOSIS — E78.00 PURE HYPERCHOLESTEROLEMIA: ICD-10-CM

## 2025-07-23 DIAGNOSIS — I10 PRIMARY HYPERTENSION: ICD-10-CM

## 2025-07-23 DIAGNOSIS — E03.9 HYPOTHYROIDISM, UNSPECIFIED TYPE: Primary | ICD-10-CM

## 2025-07-23 DIAGNOSIS — I42.9 CARDIOMYOPATHY, UNSPECIFIED TYPE (HCC): ICD-10-CM

## 2025-07-23 DIAGNOSIS — I44.7 LBBB (LEFT BUNDLE BRANCH BLOCK): ICD-10-CM

## 2025-07-23 DIAGNOSIS — R94.31 ABNORMAL EKG: ICD-10-CM

## 2025-07-23 NOTE — TELEPHONE ENCOUNTER
----- Message from Terrell Shaffer MD sent at 7/23/2025  2:49 PM EDT -----  She needs lipid profile, liver profile, TSH and T4.  Thank you.  ----- Message -----  From: Jaimie Carnes MA  Sent: 7/23/2025   2:33 PM EDT  To: Terrell Shaffer MD      ----- Message -----  From: Beatrice Yañez MA  Sent: 7/22/2025   8:50 AM EDT  To: Jaimie aCrnes MA      ----- Message -----  From: Cinthia Pennington  Sent: 7/22/2025   8:44 AM EDT  To: [facility] [place] [facility]    Patient is to have blood work that was done previously repeated.  Patient is a patient of Dr. Shaffer.  Daughter is calling to ask which blood tests need to be repeated and to have them entered into the system.  Please call her daughter Cat @ [phone]

## 2025-07-26 LAB
ALBUMIN SERPL-MCNC: 4 G/DL (ref 3.7–4.7)
ALP SERPL-CCNC: 64 IU/L (ref 44–121)
ALT SERPL-CCNC: 13 IU/L (ref 0–32)
AST SERPL-CCNC: 18 IU/L (ref 0–40)
BILIRUB SERPL-MCNC: 0.5 MG/DL (ref 0–1.2)
BUN SERPL-MCNC: 25 MG/DL (ref 8–27)
BUN/CREAT SERPL: 23 (ref 12–28)
CALCIUM SERPL-MCNC: 9.1 MG/DL (ref 8.7–10.3)
CHLORIDE SERPL-SCNC: 98 MMOL/L (ref 96–106)
CHOLEST SERPL-MCNC: 220 MG/DL (ref 100–199)
CO2 SERPL-SCNC: 22 MMOL/L (ref 20–29)
CREAT SERPL-MCNC: 1.07 MG/DL (ref 0.57–1)
EGFR: 51 ML/MIN/1.73
GLOBULIN SER-MCNC: 2 G/DL (ref 1.5–4.5)
GLUCOSE SERPL-MCNC: 164 MG/DL (ref 70–99)
HDLC SERPL-MCNC: 70 MG/DL
LDLC SERPL CALC-MCNC: 137 MG/DL (ref 0–99)
POTASSIUM SERPL-SCNC: 4.3 MMOL/L (ref 3.5–5.2)
PROT SERPL-MCNC: 6 G/DL (ref 6–8.5)
SL AMB VLDL CHOLESTEROL CALC: 13 MG/DL (ref 5–40)
SODIUM SERPL-SCNC: 137 MMOL/L (ref 134–144)
TRIGL SERPL-MCNC: 72 MG/DL (ref 0–149)
TSH SERPL DL<=0.005 MIU/L-ACNC: 2.79 UIU/ML (ref 0.45–4.5)

## 2025-08-05 ENCOUNTER — TELEPHONE (OUTPATIENT)
Age: 85
End: 2025-08-05